# Patient Record
Sex: FEMALE | Race: WHITE | NOT HISPANIC OR LATINO | URBAN - METROPOLITAN AREA
[De-identification: names, ages, dates, MRNs, and addresses within clinical notes are randomized per-mention and may not be internally consistent; named-entity substitution may affect disease eponyms.]

---

## 2020-06-30 ENCOUNTER — INPATIENT (INPATIENT)
Facility: HOSPITAL | Age: 65
LOS: 2 days | Discharge: NURSING, REHAB OR LONG-TERM CARE | DRG: 947 | End: 2020-07-03
Payer: MEDICARE

## 2020-06-30 VITALS
DIASTOLIC BLOOD PRESSURE: 82 MMHG | RESPIRATION RATE: 20 BRPM | OXYGEN SATURATION: 88 % | SYSTOLIC BLOOD PRESSURE: 137 MMHG | TEMPERATURE: 98 F | HEART RATE: 101 BPM

## 2020-06-30 DIAGNOSIS — E11.9 TYPE 2 DIABETES MELLITUS WITHOUT COMPLICATIONS: ICD-10-CM

## 2020-06-30 DIAGNOSIS — F31.9 BIPOLAR DISORDER, UNSPECIFIED: ICD-10-CM

## 2020-06-30 DIAGNOSIS — R56.9 UNSPECIFIED CONVULSIONS: ICD-10-CM

## 2020-06-30 DIAGNOSIS — J96.91 RESPIRATORY FAILURE, UNSPECIFIED WITH HYPOXIA: ICD-10-CM

## 2020-06-30 DIAGNOSIS — E03.9 HYPOTHYROIDISM, UNSPECIFIED: ICD-10-CM

## 2020-06-30 DIAGNOSIS — R29.898 OTHER SYMPTOMS AND SIGNS INVOLVING THE MUSCULOSKELETAL SYSTEM: ICD-10-CM

## 2020-06-30 DIAGNOSIS — R41.82 ALTERED MENTAL STATUS, UNSPECIFIED: ICD-10-CM

## 2020-06-30 DIAGNOSIS — R63.8 OTHER SYMPTOMS AND SIGNS CONCERNING FOOD AND FLUID INTAKE: ICD-10-CM

## 2020-06-30 DIAGNOSIS — N32.89 OTHER SPECIFIED DISORDERS OF BLADDER: ICD-10-CM
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
## 2020-06-30 DIAGNOSIS — D69.6 THROMBOCYTOPENIA, UNSPECIFIED: ICD-10-CM

## 2020-06-30 LAB
ALBUMIN SERPL ELPH-MCNC: 3.6 G/DL — SIGNIFICANT CHANGE UP (ref 3.3–5)
ALP SERPL-CCNC: 59 U/L — SIGNIFICANT CHANGE UP (ref 40–120)
ALT FLD-CCNC: 16 U/L — SIGNIFICANT CHANGE UP (ref 10–45)
ANION GAP SERPL CALC-SCNC: 14 MMOL/L — SIGNIFICANT CHANGE UP (ref 5–17)
ANISOCYTOSIS BLD QL: SLIGHT — SIGNIFICANT CHANGE UP
APPEARANCE UR: CLEAR — SIGNIFICANT CHANGE UP
APTT BLD: 31.2 SEC — SIGNIFICANT CHANGE UP (ref 27.5–35.5)
AST SERPL-CCNC: 13 U/L — SIGNIFICANT CHANGE UP (ref 10–40)
BASE EXCESS BLDA CALC-SCNC: 2.1 MMOL/L — SIGNIFICANT CHANGE UP (ref -2–3)
BASO STIPL BLD QL SMEAR: SLIGHT — SIGNIFICANT CHANGE UP
BASOPHILS # BLD AUTO: 0.01 K/UL — SIGNIFICANT CHANGE UP (ref 0–0.2)
BASOPHILS NFR BLD AUTO: 0.2 % — SIGNIFICANT CHANGE UP (ref 0–2)
BILIRUB SERPL-MCNC: 0.3 MG/DL — SIGNIFICANT CHANGE UP (ref 0.2–1.2)
BILIRUB UR-MCNC: NEGATIVE — SIGNIFICANT CHANGE UP
BLD GP AB SCN SERPL QL: NEGATIVE — SIGNIFICANT CHANGE UP
BUN SERPL-MCNC: 22 MG/DL — SIGNIFICANT CHANGE UP (ref 7–23)
BURR CELLS BLD QL SMEAR: SIGNIFICANT CHANGE UP
CALCIUM SERPL-MCNC: 8.7 MG/DL — SIGNIFICANT CHANGE UP (ref 8.4–10.5)
CHLORIDE SERPL-SCNC: 106 MMOL/L — SIGNIFICANT CHANGE UP (ref 96–108)
CK MB CFR SERPL CALC: 2.6 NG/ML — SIGNIFICANT CHANGE UP (ref 0–6.7)
CO2 SERPL-SCNC: 24 MMOL/L — SIGNIFICANT CHANGE UP (ref 22–31)
COLOR SPEC: YELLOW — SIGNIFICANT CHANGE UP
CREAT SERPL-MCNC: 1.16 MG/DL — SIGNIFICANT CHANGE UP (ref 0.5–1.3)
D DIMER BLD IA.RAPID-MCNC: <150 NG/ML DDU — SIGNIFICANT CHANGE UP
DACRYOCYTES BLD QL SMEAR: SLIGHT — SIGNIFICANT CHANGE UP
DIFF PNL FLD: NEGATIVE — SIGNIFICANT CHANGE UP
EOSINOPHIL # BLD AUTO: 0.08 K/UL — SIGNIFICANT CHANGE UP (ref 0–0.5)
EOSINOPHIL NFR BLD AUTO: 1.6 % — SIGNIFICANT CHANGE UP (ref 0–6)
GAS PNL BLDV: SIGNIFICANT CHANGE UP
GLUCOSE BLDC GLUCOMTR-MCNC: 100 MG/DL — HIGH (ref 70–99)
GLUCOSE BLDC GLUCOMTR-MCNC: 113 MG/DL — HIGH (ref 70–99)
GLUCOSE BLDC GLUCOMTR-MCNC: 204 MG/DL — HIGH (ref 70–99)
GLUCOSE SERPL-MCNC: 183 MG/DL — HIGH (ref 70–99)
GLUCOSE UR QL: NEGATIVE — SIGNIFICANT CHANGE UP
HCO3 BLDA-SCNC: 28 MMOL/L — SIGNIFICANT CHANGE UP (ref 21–28)
HCT VFR BLD CALC: 35.1 % — SIGNIFICANT CHANGE UP (ref 34.5–45)
HGB BLD-MCNC: 10.4 G/DL — LOW (ref 11.5–15.5)
IMM GRANULOCYTES NFR BLD AUTO: 0.6 % — SIGNIFICANT CHANGE UP (ref 0–1.5)
INR BLD: 1.04 — SIGNIFICANT CHANGE UP (ref 0.88–1.16)
KETONES UR-MCNC: NEGATIVE — SIGNIFICANT CHANGE UP
LACTATE SERPL-SCNC: 1.9 MMOL/L — SIGNIFICANT CHANGE UP (ref 0.5–2)
LACTATE SERPL-SCNC: 2.1 MMOL/L — HIGH (ref 0.5–2)
LACTATE SERPL-SCNC: 3.7 MMOL/L — HIGH (ref 0.5–2)
LEUKOCYTE ESTERASE UR-ACNC: NEGATIVE — SIGNIFICANT CHANGE UP
LG PLATELETS BLD QL AUTO: PRESENT — SIGNIFICANT CHANGE UP
LIDOCAIN IGE QN: 26 U/L — SIGNIFICANT CHANGE UP (ref 7–60)
LYMPHOCYTES # BLD AUTO: 2.56 K/UL — SIGNIFICANT CHANGE UP (ref 1–3.3)
LYMPHOCYTES # BLD AUTO: 44 % — SIGNIFICANT CHANGE UP (ref 13–44)
LYMPHOCYTES # BLD AUTO: 52.6 % — HIGH (ref 13–44)
MAGNESIUM SERPL-MCNC: 1.7 MG/DL — SIGNIFICANT CHANGE UP (ref 1.6–2.6)
MANUAL DIF COMMENT BLD-IMP: SIGNIFICANT CHANGE UP
MANUAL SMEAR VERIFICATION: SIGNIFICANT CHANGE UP
MCHC RBC-ENTMCNC: 24.9 PG — LOW (ref 27–34)
MCHC RBC-ENTMCNC: 29.6 GM/DL — LOW (ref 32–36)
MCV RBC AUTO: 84 FL — SIGNIFICANT CHANGE UP (ref 80–100)
MICROCYTES BLD QL: SLIGHT — SIGNIFICANT CHANGE UP
MONOCYTES # BLD AUTO: 0.24 K/UL — SIGNIFICANT CHANGE UP (ref 0–0.9)
MONOCYTES NFR BLD AUTO: 4.9 % — SIGNIFICANT CHANGE UP (ref 2–14)
MONOCYTES NFR BLD AUTO: 9 % — SIGNIFICANT CHANGE UP (ref 2–14)
NEUTROPHILS # BLD AUTO: 1.95 K/UL — SIGNIFICANT CHANGE UP (ref 1.8–7.4)
NEUTROPHILS NFR BLD AUTO: 40.1 % — LOW (ref 43–77)
NEUTROPHILS NFR BLD AUTO: 47 % — SIGNIFICANT CHANGE UP (ref 43–77)
NITRITE UR-MCNC: NEGATIVE — SIGNIFICANT CHANGE UP
NRBC # BLD: 0 /100 WBCS — SIGNIFICANT CHANGE UP (ref 0–0)
NT-PROBNP SERPL-SCNC: 89 PG/ML — SIGNIFICANT CHANGE UP (ref 0–300)
OVALOCYTES BLD QL SMEAR: SLIGHT — SIGNIFICANT CHANGE UP
PCO2 BLDA: 47 MMHG — HIGH (ref 32–45)
PH BLDA: 7.38 — SIGNIFICANT CHANGE UP (ref 7.35–7.45)
PH UR: 6 — SIGNIFICANT CHANGE UP (ref 5–8)
PLAT MORPH BLD: ABNORMAL
PLATELET # BLD AUTO: 75 K/UL — LOW (ref 150–400)
PO2 BLDA: 96 MMHG — SIGNIFICANT CHANGE UP (ref 83–108)
POIKILOCYTOSIS BLD QL AUTO: SLIGHT — SIGNIFICANT CHANGE UP
POLYCHROMASIA BLD QL SMEAR: SLIGHT — SIGNIFICANT CHANGE UP
POTASSIUM SERPL-MCNC: 4.2 MMOL/L — SIGNIFICANT CHANGE UP (ref 3.5–5.3)
POTASSIUM SERPL-SCNC: 4.2 MMOL/L — SIGNIFICANT CHANGE UP (ref 3.5–5.3)
PROT SERPL-MCNC: 6.5 G/DL — SIGNIFICANT CHANGE UP (ref 6–8.3)
PROT UR-MCNC: NEGATIVE MG/DL — SIGNIFICANT CHANGE UP
PROTHROM AB SERPL-ACNC: 12.4 SEC — SIGNIFICANT CHANGE UP (ref 10.6–13.6)
RBC # BLD: 4.18 M/UL — SIGNIFICANT CHANGE UP (ref 3.8–5.2)
RBC # FLD: 16.3 % — HIGH (ref 10.3–14.5)
RBC BLD AUTO: ABNORMAL
RH IG SCN BLD-IMP: POSITIVE — SIGNIFICANT CHANGE UP
SAO2 % BLDA: 97 % — SIGNIFICANT CHANGE UP (ref 95–100)
SARS-COV-2 RNA SPEC QL NAA+PROBE: SIGNIFICANT CHANGE UP
SMUDGE CELLS # BLD: SIGNIFICANT CHANGE UP
SODIUM SERPL-SCNC: 144 MMOL/L — SIGNIFICANT CHANGE UP (ref 135–145)
SP GR SPEC: 1.02 — SIGNIFICANT CHANGE UP (ref 1–1.03)
SPHEROCYTES BLD QL SMEAR: SLIGHT — SIGNIFICANT CHANGE UP
TROPONIN T SERPL-MCNC: <0.01 NG/ML — SIGNIFICANT CHANGE UP (ref 0–0.01)
TSH SERPL-MCNC: 0.01 UIU/ML — LOW (ref 0.35–4.94)
UROBILINOGEN FLD QL: 0.2 E.U./DL — SIGNIFICANT CHANGE UP
WBC # BLD: 4.87 K/UL — SIGNIFICANT CHANGE UP (ref 3.8–10.5)
WBC # FLD AUTO: 4.87 K/UL — SIGNIFICANT CHANGE UP (ref 3.8–10.5)

## 2020-06-30 PROCEDURE — 72125 CT NECK SPINE W/O DYE: CPT | Mod: 26

## 2020-06-30 PROCEDURE — 93308 TTE F-UP OR LMTD: CPT | Mod: 26

## 2020-06-30 PROCEDURE — 72131 CT LUMBAR SPINE W/O DYE: CPT | Mod: 26

## 2020-06-30 PROCEDURE — 71045 X-RAY EXAM CHEST 1 VIEW: CPT | Mod: 26

## 2020-06-30 PROCEDURE — 99223 1ST HOSP IP/OBS HIGH 75: CPT | Mod: GC

## 2020-06-30 PROCEDURE — 93010 ELECTROCARDIOGRAM REPORT: CPT

## 2020-06-30 PROCEDURE — 72170 X-RAY EXAM OF PELVIS: CPT | Mod: 26

## 2020-06-30 PROCEDURE — 70450 CT HEAD/BRAIN W/O DYE: CPT | Mod: 26

## 2020-06-30 PROCEDURE — 99285 EMERGENCY DEPT VISIT HI MDM: CPT | Mod: CS

## 2020-06-30 PROCEDURE — 99222 1ST HOSP IP/OBS MODERATE 55: CPT

## 2020-06-30 RX ORDER — QUETIAPINE FUMARATE 200 MG/1
400 TABLET, FILM COATED ORAL AT BEDTIME
Refills: 0 | Status: DISCONTINUED | OUTPATIENT
Start: 2020-06-30 | End: 2020-07-03

## 2020-06-30 RX ORDER — NITROFURANTOIN MACROCRYSTAL 50 MG
50 CAPSULE ORAL
Refills: 0 | Status: DISCONTINUED | OUTPATIENT
Start: 2020-06-30 | End: 2020-06-30

## 2020-06-30 RX ORDER — GLUCAGON INJECTION, SOLUTION 0.5 MG/.1ML
1 INJECTION, SOLUTION SUBCUTANEOUS ONCE
Refills: 0 | Status: DISCONTINUED | OUTPATIENT
Start: 2020-06-30 | End: 2020-07-03

## 2020-06-30 RX ORDER — SODIUM CHLORIDE 9 MG/ML
2000 INJECTION INTRAMUSCULAR; INTRAVENOUS; SUBCUTANEOUS ONCE
Refills: 0 | Status: COMPLETED | OUTPATIENT
Start: 2020-06-30 | End: 2020-06-30

## 2020-06-30 RX ORDER — ATORVASTATIN CALCIUM 80 MG/1
20 TABLET, FILM COATED ORAL AT BEDTIME
Refills: 0 | Status: DISCONTINUED | OUTPATIENT
Start: 2020-06-30 | End: 2020-07-03

## 2020-06-30 RX ORDER — NITROFURANTOIN MACROCRYSTAL 50 MG
50 CAPSULE ORAL EVERY 24 HOURS
Refills: 0 | Status: DISCONTINUED | OUTPATIENT
Start: 2020-06-30 | End: 2020-07-03

## 2020-06-30 RX ORDER — DEXTROSE 50 % IN WATER 50 %
15 SYRINGE (ML) INTRAVENOUS ONCE
Refills: 0 | Status: DISCONTINUED | OUTPATIENT
Start: 2020-06-30 | End: 2020-07-03

## 2020-06-30 RX ORDER — DEXTROSE 50 % IN WATER 50 %
25 SYRINGE (ML) INTRAVENOUS ONCE
Refills: 0 | Status: DISCONTINUED | OUTPATIENT
Start: 2020-06-30 | End: 2020-07-03

## 2020-06-30 RX ORDER — INSULIN LISPRO 100/ML
VIAL (ML) SUBCUTANEOUS
Refills: 0 | Status: DISCONTINUED | OUTPATIENT
Start: 2020-06-30 | End: 2020-07-03

## 2020-06-30 RX ORDER — CHOLECALCIFEROL (VITAMIN D3) 125 MCG
1000 CAPSULE ORAL DAILY
Refills: 0 | Status: DISCONTINUED | OUTPATIENT
Start: 2020-06-30 | End: 2020-07-03

## 2020-06-30 RX ORDER — FERROUS SULFATE 325(65) MG
325 TABLET ORAL DAILY
Refills: 0 | Status: DISCONTINUED | OUTPATIENT
Start: 2020-06-30 | End: 2020-07-03

## 2020-06-30 RX ORDER — ARIPIPRAZOLE 15 MG/1
10 TABLET ORAL DAILY
Refills: 0 | Status: DISCONTINUED | OUTPATIENT
Start: 2020-06-30 | End: 2020-07-03

## 2020-06-30 RX ORDER — DIVALPROEX SODIUM 500 MG/1
1000 TABLET, DELAYED RELEASE ORAL AT BEDTIME
Refills: 0 | Status: DISCONTINUED | OUTPATIENT
Start: 2020-06-30 | End: 2020-07-03

## 2020-06-30 RX ORDER — OXYBUTYNIN CHLORIDE 5 MG
10 TABLET ORAL DAILY
Refills: 0 | Status: DISCONTINUED | OUTPATIENT
Start: 2020-06-30 | End: 2020-06-30

## 2020-06-30 RX ORDER — SODIUM CHLORIDE 9 MG/ML
1000 INJECTION, SOLUTION INTRAVENOUS
Refills: 0 | Status: DISCONTINUED | OUTPATIENT
Start: 2020-06-30 | End: 2020-07-03

## 2020-06-30 RX ORDER — OXYBUTYNIN CHLORIDE 5 MG
5 TABLET ORAL EVERY 12 HOURS
Refills: 0 | Status: DISCONTINUED | OUTPATIENT
Start: 2020-06-30 | End: 2020-07-03

## 2020-06-30 RX ORDER — LIOTHYRONINE SODIUM 25 UG/1
25 TABLET ORAL DAILY
Refills: 0 | Status: DISCONTINUED | OUTPATIENT
Start: 2020-06-30 | End: 2020-07-03

## 2020-06-30 RX ORDER — ENOXAPARIN SODIUM 100 MG/ML
40 INJECTION SUBCUTANEOUS EVERY 24 HOURS
Refills: 0 | Status: DISCONTINUED | OUTPATIENT
Start: 2020-06-30 | End: 2020-07-03

## 2020-06-30 RX ORDER — DEXTROSE 50 % IN WATER 50 %
12.5 SYRINGE (ML) INTRAVENOUS ONCE
Refills: 0 | Status: DISCONTINUED | OUTPATIENT
Start: 2020-06-30 | End: 2020-07-03

## 2020-06-30 RX ADMIN — DIVALPROEX SODIUM 1000 MILLIGRAM(S): 500 TABLET, DELAYED RELEASE ORAL at 23:47

## 2020-06-30 RX ADMIN — QUETIAPINE FUMARATE 400 MILLIGRAM(S): 200 TABLET, FILM COATED ORAL at 23:47

## 2020-06-30 RX ADMIN — Medication 50 MILLIGRAM(S): at 18:18

## 2020-06-30 RX ADMIN — ATORVASTATIN CALCIUM 20 MILLIGRAM(S): 80 TABLET, FILM COATED ORAL at 22:56

## 2020-06-30 RX ADMIN — Medication 30 MILLIGRAM(S): at 11:05

## 2020-06-30 RX ADMIN — ARIPIPRAZOLE 10 MILLIGRAM(S): 15 TABLET ORAL at 11:04

## 2020-06-30 RX ADMIN — ENOXAPARIN SODIUM 40 MILLIGRAM(S): 100 INJECTION SUBCUTANEOUS at 22:56

## 2020-06-30 RX ADMIN — Medication 325 MILLIGRAM(S): at 11:04

## 2020-06-30 RX ADMIN — LIOTHYRONINE SODIUM 25 MICROGRAM(S): 25 TABLET ORAL at 18:19

## 2020-06-30 RX ADMIN — SODIUM CHLORIDE 2000 MILLILITER(S): 9 INJECTION INTRAMUSCULAR; INTRAVENOUS; SUBCUTANEOUS at 05:15

## 2020-06-30 RX ADMIN — Medication 4: at 22:56

## 2020-06-30 RX ADMIN — Medication 1000 UNIT(S): at 11:04

## 2020-06-30 RX ADMIN — Medication 5 MILLIGRAM(S): at 22:56

## 2020-06-30 NOTE — H&P ADULT - PROBLEM SELECTOR PLAN 1
- Patient presents for 5 falls in the last 3 days associated with lightheadedness and LE weakness. There are no signs of cord compression on exam. There is LE weakness and it is unclear how long that has been present  - Differential is wide. Most likely causes are polypharmacy in this patient with multiple medications that can cause somnolence vs orthostatic hypotension. Cardiogenic cause is less likely given patient's EKG without significant abnormalities and Trop negative x1 and has no anginal symptoms. Syncope is not described. Neurogenic causes possible, CT head without stroke and history is not consistent with seizure activity. May be in setting of hypoglycemia as patient on Januvia outpatient. Less likely infectious as patient without s/s infection  - Check orthostatics  - Obtain echocardiogram (also in setting of SOB)  - CT lumbar and cervical spine without acute compression fractures  - CT head negative for any findings  - PT recs for disposition, anticipate [facility]AI

## 2020-06-30 NOTE — H&P ADULT - PROBLEM SELECTOR PLAN 9
F; s/p 2L NS in ED  E: Replete PRN  N: DASH CC  FULL CODE  DVT prophy lovenox  D RMF - Continue home oxybutynin 10mg ER therapeutic interchange 5mg daily  - on nitrofurantoin 50mg daily prophy for hx recurrent UTIs, will continue

## 2020-06-30 NOTE — ED ADULT NURSE NOTE - OBJECTIVE STATEMENT
Pt arrives c/o of AMS. Pt lethargic and responsive to painful stimuli initially. Pt awake during assessment/IV start. Pt Aox3. Pt reports multiple falls today and states she hasn't been using her walker, and reports she was unbable to walk earlier today. Pt complains of R sided back pain, worse with palpation. Pt reports to ETOH tonight. Pt has slurred speech, AOx3.

## 2020-06-30 NOTE — ED PROVIDER NOTE - ATTENDING CONTRIBUTION TO CARE
66 yo f w hx of DM, anxiety, depression presents to ED with concern for fall - noted to have multiple falls over the past few days.  Son at bedside notes she has fallen several times, hitting her life alert.  Patient awake and alert on arrival to ED and notes mild pain to right lower  back.  On my face to face ED eval, patient is non toxic in appearance.  Awake, alert and in NAD.  NC/AT.  no midline cervical spine pain/tenderness/stepoff/deformity. no midline thoracic or lumbar spine pain/tenderness/stepoff/deformity.  Pelvis stable to compression.  + Mild tenderness on palpation to right lumbar paraspinal musculature.  Will check labs, imaging of head, neck chest, pelvis, back and dispo accordingly.  Anticipate admission.

## 2020-06-30 NOTE — ED ADULT NURSE NOTE - NSIMPLEMENTINTERV_GEN_ALL_ED
Implemented All Fall Risk Interventions:  [place] to call system. Call bell, personal items and telephone within reach. Instruct patient to call for assistance. Room bathroom lighting operational. Non-slip footwear when patient is off stretcher. Physically safe environment: no spills, clutter or unnecessary equipment. Stretcher in lowest position, wheels locked, appropriate side rails in place. Provide visual cue, wrist band, yellow gown, etc. Monitor gait and stability. Monitor for mental status changes and reorient to person, place, and time. Review medications for side effects contributing to fall risk. Reinforce activity limits and safety measures with patient and family.

## 2020-06-30 NOTE — ED PROVIDER NOTE - PROGRESS NOTE DETAILS
neeru: pt seen upon arrival for dr franco w/ miguel granados.    per ems, pt s/p fall x5 found alert but now somnolent upon arrival to ED. in triage, afebrile, hds, spo2 87% on RA. somnolent w/ poor response to sternal rub.    upon arrival to resus room, pt initially somnolent but stirred awake and remained alert s/p sternal rub/PIV placement. afebrile. hds. initially sinus tachy. no hypoxia. abc intact. gcs 15. a+ox3. no acute focal neuro deficits, unable to assess gait. +L lateral lower back pain. otherwise asymptomatic and atraumatic. fs wnl. ekg w/o acute abnl. no indication for emergent RSI. labs/cxr/pelvis xray/ct head/ct c spine/ct l spine. care taken over by dr franco. neeru: pt seen upon arrival for dr franco w/ pa roberta. per ems, pt s/p fall x5 found alert but now somnolent upon arrival to ED. pt on klonopin/seroquel. no antiplatelet/AC. in triage, afebrile, hds, spo2 87% on RA. somnolent w/ poor response to sternal rub.    upon arrival to resus room, pt initially somnolent but stirred awake and remained alert s/p sternal rub/PIV placement. afebrile. hds. initially sinus tachy. no hypoxia. abc intact. gcs 15. a+ox3. no acute focal neuro deficits, unable to assess gait. +L lateral lower back pain. otherwise asymptomatic and atraumatic. fs wnl. ekg w/o acute abnl. no indication for emergent RSI. labs/cxr/pelvis xray/ct head/ct c spine/ct l spine. care taken over by dr franco.

## 2020-06-30 NOTE — ED ADULT NURSE REASSESSMENT NOTE - NS ED NURSE REASSESS COMMENT FT1
Patient awaiting transport to bed assignment on [address] [place]. Bed sheets changed and patient repositioned. Patient and family updated about plan of care. Will continue to monitor.

## 2020-06-30 NOTE — CONSULT NOTE ADULT - PROBLEM SELECTOR RECOMMENDATION 2
Lethargic on exam and interview. Responds to sternal rub and painful stimuli.  - Potential cause Polypharmacy and/or drug interactions (on high-dose seroquel, depakote) and anticholinergic agents  - Consider psych consult regarding home regimen for bipolar depression  - EEG to assess post-ictal state as cause of altered mental status

## 2020-06-30 NOTE — CONSULT NOTE ADULT - SUBJECTIVE AND OBJECTIVE BOX
HPI: Ms. Gruber is a 65 year old female with PMHx of bipolar disorder, HLD, T2DM, anemia, prior UTIs on chronic nitrofurantoin, and hypothyroidism who presents for acute falls. She was accompanied by her son and she states she feels weak. She has suffered multiple falls within the last 3 days. She has been evaluated in the past for falls and it was attributed to weakness. She was seen for a similar problem at an ED in the [place] and was told she needs physical therapy.     T(C): 36.4 (20 @ 09:53), Max: 36.7 (20 @ 04:01)  HR: 59 (20 @ 13:40) (56 - 101)  BP: 157/90 (20 @ 13:40) (119/68 - 178/75)  RR: 18 (20 @ 13:40) (16 - 20)  SpO2: 95% (20 @ 13:40) (88% - 97%)    PAST MEDICAL & SURGICAL HISTORY:  Bipolar disorder  Hypothyroid  T2DM (type 2 diabetes mellitus)    FAMILY HISTORY:    SOCIAL HISTORY:  Denies smoking, drinking, or drug use    ROS: Negative unless otherwise noted    MEDICATIONS  (STANDING):  ARIPiprazole 10 milliGRAM(s) Oral daily  atorvastatin 20 milliGRAM(s) Oral at bedtime  cholecalciferol 1000 Unit(s) Oral daily  dextrose 5%. 1000 milliLiter(s) (50 mL/Hr) IV Continuous <Continuous>  dextrose 50% Injectable 12.5 Gram(s) IV Push once  dextrose 50% Injectable 25 Gram(s) IV Push once  dextrose 50% Injectable 25 Gram(s) IV Push once  diVALproex DR 1000 milliGRAM(s) Oral at bedtime  enoxaparin Injectable 40 milliGRAM(s) SubCutaneous every 24 hours  ferrous    sulfate 325 milliGRAM(s) Oral daily  insulin lispro (HumaLOG) corrective regimen sliding scale   SubCutaneous Before meals and at bedtime  liothyronine 25 MICROGram(s) Oral daily  nitrofurantoin macrocrystals (MACRODANTIN) 50 milliGRAM(s) Oral every 24 hours  oxybutynin 5 milliGRAM(s) Oral every 12 hours  PARoxetine 30 milliGRAM(s) Oral daily  QUEtiapine 400 milliGRAM(s) Oral at bedtime    MEDICATIONS  (PRN):  dextrose 40% Gel 15 Gram(s) Oral once PRN Blood Glucose LESS THAN 70 milliGRAM(s)/deciliter  glucagon  Injectable 1 milliGRAM(s) IntraMuscular once PRN Glucose LESS THAN 70 milligrams/deciliter    Allergies    No Known Allergies    Intolerances      Vital Signs Last 24 Hrs  T(C): 36.4 (2020 09:53), Max: 36.7 (2020 04:01)  T(F): 97.5 (2020 09:53), Max: 98 (2020 04:01)  HR: 59 (2020 13:40) (56 - 101)  BP: 157/90 (2020 13:40) (119/68 - 178/75)  RR: 18 (2020 13:40) (16 - 20)  SpO2: 95% (2020 13:40) (88% - 97%)    Physical exam:  General: Somnolent, difficult to arouse but responds to sternal rub  Cardiovascular: Regular rate and rhythm, no murmurs  Pulmonary: Anterior breath sounds clear bilaterally  Extremities: Radial and DP pulses +2, no edema    Neurologic:  -Mental status: AAO x 1 (responds to name at time of exam)  -Cranial nerves:   II: Visual fields are full to confrontation.  III, IV, VI: Extraocular movements are intact without nystagmus. Pupils equally round and reactive to light  V:  Facial sensation V1-V3 equal and intact   VII: Face is symmetric with normal eye closure and smile  VIII: Hearing is bilaterally intact to finger rub  IX, X: Uvula is midline and soft palate rises symmetrically  XI: Head turning and shoulder shrug are intact.  XII: Tongue protrudes midline  Motor: Normal bulk and tone. No pronator drift. Strength bilateral upper extremity 5/5, bilateral lower extremities 5/5.  Rapid alternating movements intact and symmetric  Sensation: unable to assess, although feet are tender to palpation (withdraws from pain)  Coordination: Not able to participate  Reflexes: Downgoing toes bilaterally   Gait: Unable to assess      POCT Blood Glucose.: 113 mg/dL (2020 11:14)    LABS:                        10.4   4.87  )-----------( 75       ( 2020 04:12 )             35.1     06-30    144  |  106  |  22  ----------------------------<  183<H>  4.2   |  24  |  1.16    Ca    8.7      2020 04:12  Mg     1.7     06-30    TPro  6.5  /  Alb  3.6  /  TBili  0.3  /  DBili  x   /  AST  13  /  ALT  16  /  AlkPhos  59  06-30    PT/INR - ( 2020 04:12 )   PT: 12.4 sec;   INR: 1.04          PTT - ( 2020 04:12 )  PTT:31.2 sec  CARDIAC MARKERS ( 2020 04:12 )  x     / <0.01 ng/mL / 72 U/L / x     / 2.6 ng/mL      Urinalysis Basic - ( 2020 06:50 )    Color: Yellow / Appearance: Clear / S.025 / pH: x  Gluc: x / Ketone: NEGATIVE  / Bili: Negative / Urobili: 0.2 E.U./dL   Blood: x / Protein: NEGATIVE mg/dL / Nitrite: NEGATIVE   Leuk Esterase: NEGATIVE / RBC: x / WBC x   Sq Epi: x / Non Sq Epi: x / Bacteria: x HPI: Ms. Gruber is a 65 year old female with PMHx of bipolar disorder, HLD, T2DM, anemia, prior UTIs on chronic nitrofurantoin, and hypothyroidism who presents for acute falls. She was accompanied by her son and she states she feels weak. She has suffered multiple falls within the last 3 days. She has been evaluated in the past for falls and it was attributed to weakness. She was seen for a similar problem at an ED in the [place] and was told she needs physical therapy.     T(C): 36.4 (20 @ 09:53), Max: 36.7 (20 @ 04:01)  HR: 59 (20 @ 13:40) (56 - 101)  BP: 157/90 (20 @ 13:40) (119/68 - 178/75)  RR: 18 (20 @ 13:40) (16 - 20)  SpO2: 95% (20 @ 13:40) (88% - 97%)    PAST MEDICAL & SURGICAL HISTORY:  Bipolar disorder  Hypothyroid  T2DM (type 2 diabetes mellitus)    FAMILY HISTORY:    SOCIAL HISTORY:  Denies smoking, drinking, or drug use    ROS: Negative unless otherwise noted    MEDICATIONS  (STANDING):  ARIPiprazole 10 milliGRAM(s) Oral daily  atorvastatin 20 milliGRAM(s) Oral at bedtime  cholecalciferol 1000 Unit(s) Oral daily  dextrose 5%. 1000 milliLiter(s) (50 mL/Hr) IV Continuous <Continuous>  dextrose 50% Injectable 12.5 Gram(s) IV Push once  dextrose 50% Injectable 25 Gram(s) IV Push once  dextrose 50% Injectable 25 Gram(s) IV Push once  diVALproex DR 1000 milliGRAM(s) Oral at bedtime  enoxaparin Injectable 40 milliGRAM(s) SubCutaneous every 24 hours  ferrous    sulfate 325 milliGRAM(s) Oral daily  insulin lispro (HumaLOG) corrective regimen sliding scale   SubCutaneous Before meals and at bedtime  liothyronine 25 MICROGram(s) Oral daily  nitrofurantoin macrocrystals (MACRODANTIN) 50 milliGRAM(s) Oral every 24 hours  oxybutynin 5 milliGRAM(s) Oral every 12 hours  PARoxetine 30 milliGRAM(s) Oral daily  QUEtiapine 400 milliGRAM(s) Oral at bedtime    MEDICATIONS  (PRN):  dextrose 40% Gel 15 Gram(s) Oral once PRN Blood Glucose LESS THAN 70 milliGRAM(s)/deciliter  glucagon  Injectable 1 milliGRAM(s) IntraMuscular once PRN Glucose LESS THAN 70 milligrams/deciliter    Allergies    No Known Allergies    Intolerances      Vital Signs Last 24 Hrs  T(C): 36.4 (2020 09:53), Max: 36.7 (2020 04:01)  T(F): 97.5 (2020 09:53), Max: 98 (2020 04:01)  HR: 59 (2020 13:40) (56 - 101)  BP: 157/90 (2020 13:40) (119/68 - 178/75)  RR: 18 (2020 13:40) (16 - 20)  SpO2: 95% (2020 13:40) (88% - 97%)    Physical exam:  General: Somnolent, difficult to arouse but responds to sternal rub  Cardiovascular: Regular rate and rhythm, no murmurs  Pulmonary: Anterior breath sounds clear bilaterally  Extremities: Radial and DP pulses +2, no edema    very lethargic, awakening briefly upon sternal rub, before drifting back to sleep.  perrl, +oculocephalics.  withdraws all 4 briskly to noxious.    POCT Blood Glucose.: 113 mg/dL (2020 11:14)    LABS:                        10.4   4.87  )-----------( 75       ( 2020 04:12 )             35.1     06-30    144  |  106  |  22  ----------------------------<  183<H>  4.2   |  24  |  1.16    Ca    8.7      2020 04:12  Mg     1.7     06-30    TPro  6.5  /  Alb  3.6  /  TBili  0.3  /  DBili  x   /  AST  13  /  ALT  16  /  AlkPhos  59  06-30    PT/INR - ( 2020 04:12 )   PT: 12.4 sec;   INR: 1.04          PTT - ( 2020 04:12 )  PTT:31.2 sec  CARDIAC MARKERS ( 2020 04:12 )  x     / <0.01 ng/mL / 72 U/L / x     / 2.6 ng/mL      Urinalysis Basic - ( 2020 06:50 )    Color: Yellow / Appearance: Clear / S.025 / pH: x  Gluc: x / Ketone: NEGATIVE  / Bili: Negative / Urobili: 0.2 E.U./dL   Blood: x / Protein: NEGATIVE mg/dL / Nitrite: NEGATIVE   Leuk Esterase: NEGATIVE / RBC: x / WBC x   Sq Epi: x / Non Sq Epi: x / Bacteria: x

## 2020-06-30 NOTE — H&P ADULT - NSHPPHYSICALEXAM_GEN_ALL_CORE
.  VITAL SIGNS:  T(C): 36.4 (06-30-20 @ 09:53), Max: 36.7 (06-30-20 @ 04:01)  T(F): 97.5 (06-30-20 @ 09:53), Max: 98 (06-30-20 @ 04:01)  HR: 56 (06-30-20 @ 09:53) (56 - 101)  BP: 161/84 (06-30-20 @ 09:53) (119/68 - 178/75)  BP(mean): --  RR: 18 (06-30-20 @ 09:53) (16 - 20)  SpO2: 96% (06-30-20 @ 09:53) (88% - 97%)  Wt(kg): --    PHYSICAL EXAM:    Constitutional: WDWN resting comfortably in bed; NAD on 2L NC  Head: NC/AT  Eyes: PERRL, EOMI, clear conjunctiva  ENT: no nasal discharge; uvula midline, no oropharyngeal erythema or exudates; MMM  Neck: supple; no JVD  Respiratory: CTA B/L; no W/R/R  Cardiac: +S1/S2; RRR; no M/R/G  Gastrointestinal: soft, NT/ND; no rebound or guarding; +BSx4  Back: spine midline, no midline tenderness; no CVAT B/L  Extremities: WWP, no clubbing or cyanosis; no peripheral edema  Musculoskeletal: Limited ROM LE at hip, see neuro exam; no joint swelling, tenderness or erythema  Vascular: 2+ radial and pedal pulses  Dermatologic: skin warm, dry and intact; no rashes, wounds, or scars  Neurologic: AAOx1-2 (self, location); CNII-XII grossly intact; moves all extremities. LE with 3/5 strength at the hip. No saddle anesthesia. Rectal tone intact. Sensation intact bilaterally in UE and LE; notable RUE shaking/trembling throughout exam  Psychiatric: affect and characteristics of appearance, verbalizations, behaviors are appropriate

## 2020-06-30 NOTE — H&P ADULT - NSHPREVIEWOFSYSTEMS_GEN_ALL_CORE
Gen: +falls; no weight loss, no fevers/chills  HEENT: +lightheadedness, no headaches, syncope, no changes in hearing, no dysphagia  CV: no chest pain, no palpitations  Pulm: no shortness of breath, no cough  GI: No abdominal pain, no diarrhea, no blood in stool  : No dysuria, no frequency  Neuro: No numbness/tingling, no headaches  MSK: +LE weakness  Heme: no easy bruising  Psych: no history of psych disorders, no depression

## 2020-06-30 NOTE — H&P ADULT - HISTORY OF PRESENT ILLNESS
INCOMPLETE NOTE This is a 65F with PMHx bipolar disorder, ?Parkinson's, HLD, T2DM, anemia, prior UTIs on chronic nitrofurantoin, and hypothyroidism who presents for acute falls. Patient was in usual state of health until 3 days ago when she began falling. Falls are associated with lightheadedness and LE weakness but no dizziness or vertigo. Patient is accompanied by son. Reporting that in the past this has occurred several years ago at which time patient was evaluated and was told the falls were due to weakness. Patient had not lost consciousness or had any head trauma. She did go to another ED in the [place] 3 days ago when this first occurred, but she was discharged from the ED and told she needed physical therapy. EMS was called today because patient fell 3 times more and son was concerned. Patient lives alone but has home health aids and nursing that assist her. She reports she has felt LE weakness but denies chest pain, shortness of breath, headaches, nausea, vomiting. She had an episode of bladder incontinence while being examined but has not had frequency, urgency, dysuria, or incontinence prior to this episode. She denies any bowel incontinence. At home and prior to this weakness, patient was able to reportedly ambulate without assistive devices.     In ED: T 98F,  --> 62, /82, RR 20 sating 88% on RA --> 97% on 2L NC. Labs notable for lactate 3.7. CT imaging of head and neck negative. Patient received 2L NS in ED.

## 2020-06-30 NOTE — ED PROVIDER NOTE - CLINICAL SUMMARY MEDICAL DECISION MAKING FREE TEXT BOX
64 y/o f poor historian hx HTN, DM, HLD, anxiety/depression presents s/p several falls in the past day; O2 sat on arrival was 88% on RA although pt slumped in EMS stretcher, when sat upright O2 sat is 95% on RA, pt with no resp complaints, only having right low back pain.  Neuro exam intact, labs significant for lactate 3.7, pt on metformin which is possible etiology, no anion gap, will hydrate and repeat.  CT head, cspine, lumbar spine pending, normal cxr and pelvis xr.  Per son who is in ED, he thinks pt fell three times at  home yesterday, she lives alone, has had several falls in the past month.  Will plan for admission for possible placement and PT eval after w/u

## 2020-06-30 NOTE — ED PROVIDER NOTE - OBJECTIVE STATEMENT
66 y/o f poor historian hx HTN, DM, HLD, anxiety/depression presents c/o several falls in the past day.  Pt stating she thinks she fell 5 times in the past day.  Pt can't recall why she fell but states "sometimes I feel wobbly when I walk."  Pt has a walker at home but seldom uses it, hadn't been using it during any of her falls.  Pt doesn't think she hit her head, no LOC for any falls, only complaint is pain to right low back.  Pt denies dizziness, CP, SOB, fever, chills, bowel/bladder incontinence, saddle anesthesia, numbness/tingling to ext, weakness, all other ROS negative.

## 2020-06-30 NOTE — H&P ADULT - PROBLEM SELECTOR PLAN 3
- 3/5 strength in LE, unclear if acute or chronic as poor historian  - As above without concern for acute process  - Neuro consulted for assistance in managing, appreciate recs  - PT

## 2020-06-30 NOTE — H&P ADULT - PROBLEM SELECTOR PLAN 2
- As above  - Patient mental status improved compared to last night per son, however not back to baseline  - Currently aaox1-2, on presentation was somnolent, reportedly at baseline aaox2-3  - Plan as above workup for fall. Also may have some symptoms in setting of hypoxia, see below

## 2020-06-30 NOTE — H&P ADULT - PROBLEM SELECTOR PLAN 4
- Patient presenting with possible hypoxia, in ED noted to have spo2 88% on RA, however then noted to have positional improvement to 95%. Placed on 2L NC  - CXR with decreased lung volumes, possible fluid overload  - Will obtain echo to assess for reduced EF  - Consider lasix if SOB worsens as patient received 2L fluid in ED  - Some atelectasis as well, give incentive spirometer  - Wean o2 as tolerated

## 2020-06-30 NOTE — CONSULT NOTE ADULT - PROBLEM SELECTOR RECOMMENDATION 9
On exam, patient lethargic and difficult to arouse; however, she does withdraw both legs to pain with proper strength. Rectal tone intact per primary team at initial assessment  - CT head, lumbar and cervical spine negative for acute findings or compression fractures, respectively  - Add on Hemoglobin A1c, DM2-related neuropathy could be relating to falls On exam, patient lethargic and difficult to arouse; however, she does withdraw both legs to pain, briskly, appearing to have proper strength. Rectal tone intact per primary team at initial assessment  - CT head, lumbar and cervical spine negative for acute findings or compression fractures, respectively  - Add on Hemoglobin A1c, DM2-related neuropathy could be relating to falls

## 2020-06-30 NOTE — H&P ADULT - ASSESSMENT
This is a 65F with PMHx bipolar disorder, ?Parkinson's, HLD, T2DM, anemia, prior UTIs on chronic nitrofurantoin, and hypothyroidism who presents for acute falls.

## 2020-06-30 NOTE — H&P ADULT - PROBLEM SELECTOR PLAN 5
- Hx bipolar d/o  - Continue home medications aripiprazole 10mg daily, valproic acid 1g nightly, paroxetine 30mg daily, and seroquel 400mg daily  - Patient is on klonopin 0.5 mg TID PRN, however will hold this medication in setting of AMS as above  - monitor for s/s benzo withdrawal - plt 75 on admission, unknown baseline, obtain collateral from pcp  - trend  - no s/s bleeding

## 2020-06-30 NOTE — H&P ADULT - NSHPLABSRESULTS_GEN_ALL_CORE
.  LABS:                         10.4   4.87  )-----------( 75       ( 2020 04:12 )             35.1         144  |  106  |  22  ----------------------------<  183<H>  4.2   |  24  |  1.16    Ca    8.7      2020 04:12  Mg     1.7         TPro  6.5  /  Alb  3.6  /  TBili  0.3  /  DBili  x   /  AST  13  /  ALT  16  /  AlkPhos  59  30    PT/INR - ( 2020 04:12 )   PT: 12.4 sec;   INR: 1.04          PTT - ( 2020 04:12 )  PTT:31.2 sec  Urinalysis Basic - ( 2020 06:50 )    Color: Yellow / Appearance: Clear / S.025 / pH: x  Gluc: x / Ketone: NEGATIVE  / Bili: Negative / Urobili: 0.2 E.U./dL   Blood: x / Protein: NEGATIVE mg/dL / Nitrite: NEGATIVE   Leuk Esterase: NEGATIVE / RBC: x / WBC x   Sq Epi: x / Non Sq Epi: x / Bacteria: x      CARDIAC MARKERS ( 2020 04:12 )  x     / <0.01 ng/mL / 72 U/L / x     / 2.6 ng/mL      Serum Pro-Brain Natriuretic Peptide: 89 pg/mL ( @ 04:12)    Lactate, Blood: 2.1 mmol/L ( @ 06:50)  Lactate, Blood: 3.7 mmol/L ( @ 04:11)      RADIOLOGY, EKG & ADDITIONAL TESTS: Reviewed.     < from: CT Head No Cont (20 @ 05:33) >    1. Nodular, polypoidmucosal thickening within the sphenoid sinus containing intrinsic calcific density. This may represent fungal infection or chronic sinus disease with inspissation.  2. Diminished attenuation within periventricular white matter consistent with chronicmicroangiopathic disease.  3. Hyperostosis frontalis interna.    < end of copied text >    < from: CT Lumbar Spine No Cont (20 @ 05:33) >    IMPRESSION:  1. No acute fractures.    2. Chronic compression deformity at T11.     < end of copied text >    < from: CT Cervical Spine No Cont (20 @ 06:39) >    IMPRESSION:    1. No acute fractures.   2. Mild nonspecific prevertebral soft tissue thickening. No obvious signs of infection or injury.     < end of copied text >    CXR- decreased lung volumes bilaterally, mild pulmonary congestion  EKG- NSR, T wave flattening in precordial and lateral leads

## 2020-06-30 NOTE — H&P ADULT - PROBLEM SELECTOR PLAN 8
- Continue home oxybutynin 10mg ER therapeutic interchange 5mg daily  - on nitrofurantoin 50mg daily prophy for hx recurrent UTIs, will continue - Continue home liothyronine 25mcg daily

## 2020-06-30 NOTE — H&P ADULT - PROBLEM SELECTOR PLAN 6
- Hx T2DM with reported hypoglycemic episodes including on prior hospital presentations for falls  - SSI  - Hold home metformin and januvia, f/u A1c in AM    #Lactic acidosis  - Patient is perfusing, appears to be mentating improved compared to prior, trend to clear, no concern for acute infection at this time unless hemodynamics change or a new source of infection is identified - Hx bipolar d/o  - Continue home medications aripiprazole 10mg daily, valproic acid 1g nightly, paroxetine 30mg daily, and seroquel 400mg daily  - Patient is on klonopin 0.5 mg TID PRN, however will hold this medication in setting of AMS as above  - monitor for s/s benzo withdrawal

## 2020-06-30 NOTE — H&P ADULT - PROBLEM SELECTOR PLAN 7
- Continue home liothyronine 25mcg daily - Hx T2DM with reported hypoglycemic episodes including on prior hospital presentations for falls  - SSI  - Hold home metformin and januvia, f/u A1c in AM    #Lactic acidosis  - Patient is perfusing, appears to be mentating improved compared to prior, trend to clear, no concern for acute infection at this time unless hemodynamics change or a new source of infection is identified

## 2020-06-30 NOTE — H&P ADULT - ATTENDING COMMENTS
Patient seen and examined with house-staff during bedside rounds.  Resident note read, including vitals, physical findings, laboratory data, and radiological reports.   Revisions included below.  Direct personal management at bed side and extensive interpretation of the data.  Plan was outlined and discussed in details with the housestaff.  Decision making of high complexity  Action taken for acute disease activity to reflect the level of care provided:  - medication reconciliation  - review laboratory data  The patient seen and examined. I discussed the case with neurology and the son. Mental status improved during the day. Followup with neurology. Echocardiogram was reviewed. Start physical therapy per dermatology consult for anemia and thrombocytopenia

## 2020-07-01 ENCOUNTER — TRANSCRIPTION ENCOUNTER (OUTPATIENT)
Age: 65
End: 2020-07-01

## 2020-07-01 DIAGNOSIS — R25.1 TREMOR, UNSPECIFIED: ICD-10-CM

## 2020-07-01 LAB
A1C WITH ESTIMATED AVERAGE GLUCOSE RESULT: 7.1 % — HIGH (ref 4–5.6)
ALBUMIN SERPL ELPH-MCNC: 3.4 G/DL — SIGNIFICANT CHANGE UP (ref 3.3–5)
ALP SERPL-CCNC: 60 U/L — SIGNIFICANT CHANGE UP (ref 40–120)
ALT FLD-CCNC: 13 U/L — SIGNIFICANT CHANGE UP (ref 10–45)
ANION GAP SERPL CALC-SCNC: 10 MMOL/L — SIGNIFICANT CHANGE UP (ref 5–17)
AST SERPL-CCNC: 11 U/L — SIGNIFICANT CHANGE UP (ref 10–40)
BILIRUB SERPL-MCNC: 0.2 MG/DL — SIGNIFICANT CHANGE UP (ref 0.2–1.2)
BUN SERPL-MCNC: 17 MG/DL — SIGNIFICANT CHANGE UP (ref 7–23)
CALCIUM SERPL-MCNC: 9.1 MG/DL — SIGNIFICANT CHANGE UP (ref 8.4–10.5)
CHLORIDE SERPL-SCNC: 107 MMOL/L — SIGNIFICANT CHANGE UP (ref 96–108)
CO2 SERPL-SCNC: 29 MMOL/L — SIGNIFICANT CHANGE UP (ref 22–31)
CREAT SERPL-MCNC: 0.97 MG/DL — SIGNIFICANT CHANGE UP (ref 0.5–1.3)
CULTURE RESULTS: NO GROWTH — SIGNIFICANT CHANGE UP
ESTIMATED AVERAGE GLUCOSE: 157 MG/DL — HIGH (ref 68–114)
GLUCOSE BLDC GLUCOMTR-MCNC: 131 MG/DL — HIGH (ref 70–99)
GLUCOSE BLDC GLUCOMTR-MCNC: 133 MG/DL — HIGH (ref 70–99)
GLUCOSE BLDC GLUCOMTR-MCNC: 134 MG/DL — HIGH (ref 70–99)
GLUCOSE BLDC GLUCOMTR-MCNC: 163 MG/DL — HIGH (ref 70–99)
GLUCOSE SERPL-MCNC: 137 MG/DL — HIGH (ref 70–99)
HCT VFR BLD CALC: 33.8 % — LOW (ref 34.5–45)
HCV AB S/CO SERPL IA: 0.12 S/CO — SIGNIFICANT CHANGE UP
HCV AB SERPL-IMP: SIGNIFICANT CHANGE UP
HGB BLD-MCNC: 10.2 G/DL — LOW (ref 11.5–15.5)
MAGNESIUM SERPL-MCNC: 1.8 MG/DL — SIGNIFICANT CHANGE UP (ref 1.6–2.6)
MCHC RBC-ENTMCNC: 24.7 PG — LOW (ref 27–34)
MCHC RBC-ENTMCNC: 30.2 GM/DL — LOW (ref 32–36)
MCV RBC AUTO: 81.8 FL — SIGNIFICANT CHANGE UP (ref 80–100)
NRBC # BLD: 0 /100 WBCS — SIGNIFICANT CHANGE UP (ref 0–0)
PCP SPEC-MCNC: SIGNIFICANT CHANGE UP
PLATELET # BLD AUTO: 71 K/UL — LOW (ref 150–400)
POTASSIUM SERPL-MCNC: 4.2 MMOL/L — SIGNIFICANT CHANGE UP (ref 3.5–5.3)
POTASSIUM SERPL-SCNC: 4.2 MMOL/L — SIGNIFICANT CHANGE UP (ref 3.5–5.3)
PROT SERPL-MCNC: 6 G/DL — SIGNIFICANT CHANGE UP (ref 6–8.3)
RBC # BLD: 4.13 M/UL — SIGNIFICANT CHANGE UP (ref 3.8–5.2)
RBC # FLD: 16.5 % — HIGH (ref 10.3–14.5)
SODIUM SERPL-SCNC: 146 MMOL/L — HIGH (ref 135–145)
SPECIMEN SOURCE: SIGNIFICANT CHANGE UP
T4 FREE SERPL-MCNC: 0.53 NG/DL — LOW (ref 0.7–1.48)
TSH SERPL-MCNC: 0.01 UIU/ML — LOW (ref 0.35–4.94)
WBC # BLD: 5.07 K/UL — SIGNIFICANT CHANGE UP (ref 3.8–10.5)
WBC # FLD AUTO: 5.07 K/UL — SIGNIFICANT CHANGE UP (ref 3.8–10.5)

## 2020-07-01 PROCEDURE — 99232 SBSQ HOSP IP/OBS MODERATE 35: CPT | Mod: GC

## 2020-07-01 PROCEDURE — 99232 SBSQ HOSP IP/OBS MODERATE 35: CPT

## 2020-07-01 PROCEDURE — 71045 X-RAY EXAM CHEST 1 VIEW: CPT | Mod: 26

## 2020-07-01 RX ORDER — MAGNESIUM SULFATE 500 MG/ML
1 VIAL (ML) INJECTION ONCE
Refills: 0 | Status: COMPLETED | OUTPATIENT
Start: 2020-07-01 | End: 2020-07-01

## 2020-07-01 RX ADMIN — LIOTHYRONINE SODIUM 25 MICROGRAM(S): 25 TABLET ORAL at 05:46

## 2020-07-01 RX ADMIN — Medication 30 MILLIGRAM(S): at 13:47

## 2020-07-01 RX ADMIN — Medication 325 MILLIGRAM(S): at 13:48

## 2020-07-01 RX ADMIN — Medication 50 MILLIGRAM(S): at 19:30

## 2020-07-01 RX ADMIN — ATORVASTATIN CALCIUM 20 MILLIGRAM(S): 80 TABLET, FILM COATED ORAL at 22:07

## 2020-07-01 RX ADMIN — Medication 5 MILLIGRAM(S): at 22:07

## 2020-07-01 RX ADMIN — ARIPIPRAZOLE 10 MILLIGRAM(S): 15 TABLET ORAL at 13:47

## 2020-07-01 RX ADMIN — Medication 100 GRAM(S): at 10:02

## 2020-07-01 RX ADMIN — Medication 5 MILLIGRAM(S): at 10:02

## 2020-07-01 RX ADMIN — Medication 1000 UNIT(S): at 13:47

## 2020-07-01 RX ADMIN — ENOXAPARIN SODIUM 40 MILLIGRAM(S): 100 INJECTION SUBCUTANEOUS at 22:07

## 2020-07-01 RX ADMIN — Medication 2: at 17:39

## 2020-07-01 RX ADMIN — DIVALPROEX SODIUM 1000 MILLIGRAM(S): 500 TABLET, DELAYED RELEASE ORAL at 22:07

## 2020-07-01 RX ADMIN — QUETIAPINE FUMARATE 400 MILLIGRAM(S): 200 TABLET, FILM COATED ORAL at 22:16

## 2020-07-01 NOTE — CONSULT NOTE ADULT - PROBLEM SELECTOR RECOMMENDATION 9
Exam much improved today; following commands and 5/5 strength in LE  - CT head, lumbar and cervical spine negative for acute findings or compression fractures, respectively  - F/U PT recommendations

## 2020-07-01 NOTE — CONSULT NOTE ADULT - SUBJECTIVE AND OBJECTIVE BOX
65F with PMHx bipolar disorder, ?Parkinson's, HLD, T2DM, anemia, prior UTIs on chronic nitrofurantoin, and hypothyroidism who presents for acute falls. Patient was in usual state of health until 3 days ago when she began falling. Falls are associated with lightheadedness and LE weakness but no dizziness or vertigo. On labs noted to have anemia and thrombocytopenia, heme called to evaluate.     PAST MEDICAL/SURGICAL HISTORY:  Bipolar disorder   Hypothyroid   T2DM (type 2 diabetes mellitus).    SOCIAL Hx:  No smoking, no drugs, lives alone but has home health aid.    FAMILY Hx:  Not pertinent at this time    Home Medications:  ARIPiprazole 10 mg oral tablet: 1 tab(s) orally once a day (2020 12:43)  atorvastatin 20 mg oral tablet: 1 tab(s) orally once a day (:43)  cholecalciferol 1000 intl units (25 mcg) oral capsule: 1 cap(s) orally once a day (2020 12:43)  clonazePAM 0.5 mg oral tablet, disintegratin tab(s) orally 3 times a day, As Needed (:43)  divalproex sodium 500 mg oral delayed release tablet: 2 tab(s) orally once a day (at bedtime) (2020 12:43)  ferrous sulfate 325 mg (65 mg elemental iron) oral tablet: 1 tab(s) orally once a day (:43)  Januvia 100 mg oral tablet: 1 tab(s) orally once a day (2020 12:43)  liothyronine 25 mcg oral tablet: 1 tab(s) orally once a day (2020 12:43)  metFORMIN 1000 mg oral tablet: 1 tab(s) orally 2 times a day (2020 12:43)  nitrofurantoin macrocrystals 50 mg oral capsule: 1 cap(s) orally once a day (2020 12:43)  oxybutynin 10 mg/24 hr oral tablet, extended release: 1 tab(s) orally once a day (2020 12:43)  PARoxetine 30 mg oral tablet: 1 tab(s) orally once a day (2020 12:43)  QUEtiapine 200 mg oral tablet: 2 tab(s) orally once a day (at bedtime) (2020 12:43)  Vascepa 1 g oral capsule: 2 cap(s) orally 2 times a day (2020 12:43)    Allergies  No Known Allergies    Vital Signs Last 24 Hrs  T(C): 36.7 (2020 13:51), Max: 36.9 (2020 06:32)  T(F): 98.1 (2020 13:51), Max: 98.5 (2020 06:51)  HR: 67 (2020 13:51) (55 - 89)  BP: 125/80 (2020 13:51) (106/60 - 125/80)  BP(mean): --  RR: 18 (2020 13:51) (17 - 19)  SpO2: 95% (2020 13:51) (94% - 96%)    Constitutional: WDWN resting comfortably in bed; NAD on 2L NC  Head: NC/AT  Eyes: PERRL, EOMI, clear conjunctiva  ENT: no nasal discharge; uvula midline, no oropharyngeal erythema or exudates; MMM  Neck: supple; no JVD  Respiratory: CTA B/L; no W/R/R  Cardiac: +S1/S2; RRR; no M/R/G  Gastrointestinal: soft, NT/ND; no rebound or guarding; +BSx4  Back: spine midline, no midline tenderness; no CVAT B/L  Extremities: WWP, no clubbing or cyanosis; no peripheral edema  Musculoskeletal: Limited ROM LE at hip, see neuro exam; no joint swelling, tenderness or erythema  Vascular: 2+ radial and pedal pulses  Dermatologic: skin warm, dry and intact; no rashes, wounds, or scars  Neurologic: AAOx1-2 (self, location); CNII-XII grossly intact; moves all extremities. LE with 3/5 strength at the hip. No saddle anesthesia. Rectal tone intact. Sensation intact bilaterally  in UE and LE; notable RUE shaking/trembling throughout exam  Psychiatric: affect and characteristics of appearance, verbalizations, behaviors are appropriate    Labs:  WBC 5, Hgb 10.2, plt 71  Cr 0.97, LFTs wnl    Imaging:  Reviewed

## 2020-07-01 NOTE — PROGRESS NOTE ADULT - PROBLEM SELECTOR PLAN 2
- As above  - Patient mental status improved compared to last night per son, however not back to baseline  - Currently aaox1-2, on presentation was somnolent, reportedly at baseline aaox2-3  - Plan as above workup for fall. Also may have some symptoms in setting of hypoxia, see below - As above  - Patient mental status improved in ED compared to previously per son, however reportedly not back to baseline aaox2-3  - denies alcohol use, drug screen negative  - somnolent on exam  - per neuro, could be due to post-ictal state  - Plan as above workup for fall. Also may have some symptoms in setting of hypoxia, see below - As above  - Patient mental status improved in ED compared to previously per son, however reportedly not back to baseline aaox2-3  - denies alcohol use, drug screen negative  - somnolent on exam  - per neuro, could be due to post-ictal state  - Plan:  as above workup for fall. Also may have some symptoms in setting of hypoxia, see below

## 2020-07-01 NOTE — DISCHARGE NOTE PROVIDER - PROVIDER TOKENS
FREE:[LAST:[Vidal],PHONE:[phone]],FAX:[(   )    -],SCHEDULEDAPPT:[07/08/2020],ESTABLISHEDPATIENT:[T]]

## 2020-07-01 NOTE — DISCHARGE NOTE PROVIDER - NSDCCPCAREPLAN_GEN_ALL_CORE_FT
PRINCIPAL DISCHARGE DIAGNOSIS  Diagnosis: Fall  Assessment and Plan of Treatment: You came to the hospital because you were falling at home. We looked at your medications and saw that one of your medications, clonazepam has a tendency to cause falls in the older population. We stopped this medication and your strength and mental status returned to baseline.      SECONDARY DISCHARGE DIAGNOSES  Diagnosis: Thrombocytopenia  Assessment and Plan of Treatment: We did a blood test while you were in the hospital and saw that your platelets were low. You were not complaining of any signs of bleeding that could be a problem with this. We recommend you follow up with a hematologist for this.    Diagnosis: Anemia  Assessment and Plan of Treatment: We did a blood test while you were in the hospital and saw that your hemoglobin was low. You were not complaining of any signs of bleeding that could cause this. We recommend you follow up with a hematologist for this.    Diagnosis: Sinus bradycardia  Assessment and Plan of Treatment: Your heart rate was a little slow while you were in the hospital. We did an EKG and it looked like your heart had a normal rhythm but it was just a little slow. You were not complaining of any symptoms associated with decreased heart rate. We are recommending you follow up with your primary care doctor/cardiologist for this.    Diagnosis: Altered mental status  Assessment and Plan of Treatment: You were confused when you first came to the hospital. We looked at your medications and saw that one of your medications, clonazepam has a tendency to cause confusion in the older population. We stopped this medication and your strength and mental status returned to baseline.

## 2020-07-01 NOTE — DISCHARGE NOTE PROVIDER - CARE PROVIDER_API CALL
Vidal,   Phone: [phone]  Fax: (   )    -  Established Patient  Scheduled Appointment: 07/08/2020

## 2020-07-01 NOTE — CONSULT NOTE ADULT - SUBJECTIVE AND OBJECTIVE BOX
Patient is a 65y old  Female who presents with a chief complaint of fall (2020 15:03)       HPI:  This is a 65F with PMHx bipolar disorder, ?Parkinson's, HLD, T2DM, anemia, prior UTIs on chronic nitrofurantoin, and hypothyroidism who presents for acute falls. Patient was in usual state of health until 3 days ago when she began falling. Falls are associated with lightheadedness and LE weakness but no dizziness or vertigo. Patient is accompanied by son. Reporting that in the past this has occurred several years ago at which time patient was evaluated and was told the falls were due to weakness. Patient had not lost consciousness or had any head trauma. She did go to another ED in the [facility] 3 days ago when this first occurred, but she was discharged from the ED and told she needed physical therapy. EMS was called today because patient fell 3 times more and son was concerned. Patient lives alone but has home health aids and nursing that assist her. She reports she has felt LE weakness but denies chest pain, shortness of breath, headaches, nausea, vomiting. She had an episode of bladder incontinence while being examined but has not had frequency, urgency, dysuria, or incontinence prior to this episode. She denies any bowel incontinence. At home and prior to this weakness, patient was able to reportedly ambulate without assistive devices.     In ED: T 98F,  --> 62, /82, RR 20 sating 88% on RA --> 97% on 2L NC. Labs notable for lactate 3.7. CT imaging of head and neck negative. Patient received 2L NS in ED. (2020 08:38)      PAST MEDICAL & SURGICAL HISTORY:  Bipolar disorder  Hypothyroid  T2DM (type 2 diabetes mellitus)      MEDICATIONS  (STANDING):  ARIPiprazole 10 milliGRAM(s) Oral daily  atorvastatin 20 milliGRAM(s) Oral at bedtime  cholecalciferol 1000 Unit(s) Oral daily  dextrose 5%. 1000 milliLiter(s) (50 mL/Hr) IV Continuous <Continuous>  dextrose 50% Injectable 12.5 Gram(s) IV Push once  dextrose 50% Injectable 25 Gram(s) IV Push once  dextrose 50% Injectable 25 Gram(s) IV Push once  diVALproex DR 1000 milliGRAM(s) Oral at bedtime  enoxaparin Injectable 40 milliGRAM(s) SubCutaneous every 24 hours  ferrous    sulfate 325 milliGRAM(s) Oral daily  insulin lispro (HumaLOG) corrective regimen sliding scale   SubCutaneous Before meals and at bedtime  liothyronine 25 MICROGram(s) Oral daily  magnesium sulfate  IVPB 1 Gram(s) IV Intermittent once  nitrofurantoin macrocrystals (MACRODANTIN) 50 milliGRAM(s) Oral every 24 hours  oxybutynin 5 milliGRAM(s) Oral every 12 hours  PARoxetine 30 milliGRAM(s) Oral daily  QUEtiapine 400 milliGRAM(s) Oral at bedtime    MEDICATIONS  (PRN):  dextrose 40% Gel 15 Gram(s) Oral once PRN Blood Glucose LESS THAN 70 milliGRAM(s)/deciliter  glucagon  Injectable 1 milliGRAM(s) IntraMuscular once PRN Glucose LESS THAN 70 milligrams/deciliter      FAMILY HISTORY:      CBC Full  -  ( 2020 07:07 )  WBC Count : 5.07 K/uL  RBC Count : 4.13 M/uL  Hemoglobin : 10.2 g/dL  Hematocrit : 33.8 %  Platelet Count - Automated : 71 K/uL  Mean Cell Volume : 81.8 fl  Mean Cell Hemoglobin : 24.7 pg  Mean Cell Hemoglobin Concentration : 30.2 gm/dL  Auto Neutrophil # : x  Auto Lymphocyte # : x  Auto Monocyte # : x  Auto Eosinophil # : x  Auto Basophil # : x  Auto Neutrophil % : x  Auto Lymphocyte % : x  Auto Monocyte % : x  Auto Eosinophil % : x  Auto Basophil % : x      -    146<H>  |  107  |  17  ----------------------------<  137<H>  4.2   |  29  |  0.97    Ca    9.1      2020 07:07  Mg     1.8     -    TPro  6.0  /  Alb  3.4  /  TBili  0.2  /  DBili  x   /  AST  11  /  ALT  13  /  AlkPhos  60  07-      Urinalysis Basic - ( 2020 06:50 )    Color: Yellow / Appearance: Clear / S.025 / pH: x  Gluc: x / Ketone: NEGATIVE  / Bili: Negative / Urobili: 0.2 E.U./dL   Blood: x / Protein: NEGATIVE mg/dL / Nitrite: NEGATIVE   Leuk Esterase: NEGATIVE / RBC: x / WBC x   Sq Epi: x / Non Sq Epi: x / Bacteria: x          Radiology:    < from: Xray Pelvis 2 views (20 @ 05:03) >  EXAM:  XR PELVIS-1 OR 2 VIEWS                          PROCEDURE DATE:  2020          INTERPRETATION:   X-RAY PELVIS    Two views    HISTORY: fall    PRIOR STUDIES: No prior studies are available for comparison.    FINDINGS:  Normal bony mineralization.  No AVN of femoral head. Hip joint spaces are preserved.  No fracture.  Normal sacroiliac joints. Osteitis pubis.    IMPRESSION:   No fracture.          < from: CT Head No Cont (20 @ 05:33) >    EXAM:  CT BRAIN                          PROCEDURE DATE:  2020          INTERPRETATION:  Keven LIZARRAGA M.D., have reviewed the images and agree with the preliminary report with the following modifications:    1. Nodular, polypoidmucosal thickening within the sphenoid sinus containing intrinsic calcific density. This may represent fungal infection or chronic sinus disease with inspissation.  2. Diminished attenuation within periventricular white matter consistent with chronicmicroangiopathic disease.  3. Hyperostosis frontalis interna.    INDICATIONS: Fall.    TECHNIQUE: Serial axial images were obtained from the skull base to the vertex without the use of intravenous contrast. Sagittal and coronal images were reformatted.     COMPARISON EXAMINATION: None.    FINDINGS:    VENTRICLES AND SULCI: Parenchymal volume is commensurate with patient age.   INTRA-AXIAL: No intracranial mass, acute hemorrhage, midline shift or acute transcortical infarct is seen.  EXTRA-AXIAL: No extra-axial fluid collection is present.   VISUALIZED SINUSES: No air-fluid levels are identified.   VISUALIZED MASTOIDS: Clear.  CALVARIUM: Normal.  MISCELLANEOUS: None.    IMPRESSION:   No acute intracranial hemorrhage or calvarial fracture.        < from: CT Cervical Spine No Cont (20 @ 06:39) >  EXAM:  CT CERVICAL SPINE                          PROCEDURE DATE:  2020          INTERPRETATION:  Keven LIZARRAGA M.D., have reviewed the images and agree with the preliminary report with the following modifications:    1. Evaluation is limited by patient motion.   2. No significant prevertebral soft tissue swelling or splaying of the spinous processes, and craniovertebral junction is normal.  3. Multilevel degenerative osteoarthritis including foraminal narrowing at the C4-C5 and C5-C6 levels, right more than left, at both levels, and no significant stenosis at the other visualized levels.  4. Multilevel degenerative disc disease with disc-osteophyte complex from C3-C4 level through to the C6-C7 levels inclusive. No focal disc herniation identified or central canal stenosis.    CERVICAL SPINE CT WITHOUT CONTRAST dated 2020 6:39 AM     CLINICAL STATEMENT: Fall.     TECHNIQUE: Contiguous noncontrast transaxial CT images were obtained through the cervical spine. Reconstructions were then created in the axial, sagittal, and coronal planes.    COMPARISON: None.    FINDINGS:    The cervical alignment is maintained without spondylolisthesis. No acute fracture is identified. There is mild multilevel vertebral body height loss and disc space narrowing. There is mild nonspecific prevertebral soft tissue thickening. No obvious signs of infection or associated injury.     No large disc herniations are seen. There is no significant bony canal. Moderate right and mild left neural foraminal narrowing at the C4-C5 and C5-C6 levels. Additional levels of mild neural foraminal narrowing are seen.    IMPRESSION:    1. No acute fractures.   2. Mild nonspecific prevertebral soft tissue thickening. No obvious signs of infection or injury.         < from: CT Lumbar Spine No Cont (20 @ 05:33) >  EXAM:  CT LUMBAR SPINE                          PROCEDURE DATE:  2020          INTERPRETATION:  I, Keven Cooper MD, have reviewed the images and agree with the preliminary report the following modifications:    1. Multilevel degenerative osteoarthritis is present in the lumbar spine in which findings include ligamentum flavum thickening and facet arthropathy with vacuum phenomenon noted within the facet joints at the L4-L5 level and L5-S1 level bilaterally.  2. Multilevel degenerative disc disease is present including vacuum phenomenon within the L5-S1 disc space as well as T10-T11.  3. On the coronal reformations, there is slight curvature in the lower lumbar spine, convex to the right.  4. L3-L4: diffuse disc bulge with mild left foraminal narrowing. No central canal stenosis.  L4-L5: diffuse disc bulge eccentric to the left with left lateral recess stenosis and left foraminal narrowing. No central canal stenosis.  L5-S1: left paracentral disc protrusion on backgroundof diffuse disc bulge with mild ventral effacement of the thecal sac. No foraminal stenosis or central canal stenosis. MR could be obtained for further characterization on an outpatient basis.    PROCEDURE: CT Lumbar spine without contrast    INDICATION: Fall.    TECHNIQUE:  Multiple axial sections were obtained from the thoracolumbar junction through the sacrum. Sagittal and coronal reformats were obtained from the axial data set. The images were reviewed in soft tissue and bone windows.    COMPARISON: None.    FINDINGS: The CT exam demonstrates the lumbar alignment to be intact. Anterior compression deformity seen at the superior endplate of T11 which appears chronic in nature. No surrounding soft tissue swelling or acute fracture lines. Vertebral bodies are otherwise preserved in height. Mild disc space narrowing at the T10-T11 with disc vacuum phenomenon seen. Disc spaces are otherwise intact. No acute fractures are seen. No acute fractures.     At the L1/2 level, there is no disc herniation. There is no spinal canal stenosis or neural foramen narrowing.    At the L2/3 level, there is no disc herniation. There is no spinal canal stenosis or neural foramen narrowing.    At the L3/4 level, there is a tiny disc bulge. There is no spinal canal stenosis or neural foramen narrowing.    At the L4/5 level, there is a tiny disc bulge. There is no spinal canal stenosis or neural foramen narrowing.    At the L5/S1 level, there is no disc herniation. There is no spinal canal stenosis or neural foramen narrowing.    IMPRESSION:  1. No acute fractures.    2. Chronic compression deformity at T11.             Vital Signs Last 24 Hrs  T(C): 36.9 (2020 06:51), Max: 37.1 (2020 19:01)  T(F): 98.5 (2020 06:51), Max: 98.7 (2020 19:01)  HR: 61 (2020 06:51) (52 - 89)  BP: 106/69 (2020 06:51) (106/60 - 161/84)  BP(mean): --  RR: 19 (2020 06:51) (17 - 19)  SpO2: 95% (2020 06:51) (94% - 96%)    REVIEW OF SYSTEMS: per HPI        Physical Exam: frail 66 yo  woman lying in bed, somnolent , difficult to arouse, opens eyes with sternal rub, EEG leads in place    Head: normocephalic, atraumatic    Eyes: PERRLA, EOMI, no nystagmus, sclera anicteric    ENT: nasal discharge, uvula midline, no oropharyngeal erythema/exudate    Neck: supple, negative JVD, negative carotid bruits, no thyromegaly    Chest: CTA bilaterally, neg wheeze/ rhonchi/ rales/ crackles/ egophany    Cardiovascular: regular rate and rhythm, neg murmurs/rubs/gallops    Abdomen: soft, non distended, non tender to palpation in all 4 quadrants, negative rebound/guarding, normal bowel sounds    Extremities: WWP, neg cyanosis/clubbing/edema, negative calf tenderness to palpation, negative Vishal's sign    Musculoskeletal:      :     Neurologic Exam:    somnolent , opens eyes with sternal rub      Motor Exam:    Upper Extremities:     RIght:   suboptimal effort > 3/5      Left :     suboptimal effort > 3/5    Lower Extremities:                 Right:   suboptimal effort > 3/5                 Left:      suboptimal effort > 3/5                 Sensory:    unable to assess sec to AMS                     DTR:             = biceps/     triceps/     brachioradialis                      = patella/   medial hamstring/ankle                      neg clonus                      neg Babinski                          Gait:  not tested        PM&R Impression:    1) deconditioned  2) no focal weakness  3) AMS/ s/p fall    Plan:    1) Physical therapy focusing on therapeutic exercises, bed mobility/transfer out of bed evaluation, progressive ambulation with assistive devices prn.    2) Anticipated Disposition Plan/Recs: anticipate subacute rehab placement

## 2020-07-01 NOTE — PHYSICAL THERAPY INITIAL EVALUATION ADULT - PERTINENT HX OF CURRENT PROBLEM, REHAB EVAL
65F  who presents for acute falls. Patient was in usual state of health until 3 days ago when she began falling. Falls are associated with lightheadedness and LE weakness but no dizziness or vertigo. Patient is accompanied by son.

## 2020-07-01 NOTE — PHYSICAL THERAPY INITIAL EVALUATION ADULT - GENERAL OBSERVATIONS, REHAB EVAL
Received supine in NAD, denies pain +O2 NC 2L, Sp02 95% on RA, EEG, IV hep, primafit. left as found +bed alarm, RN Zohra aware, call bell, desat to 88% on RA

## 2020-07-01 NOTE — PROGRESS NOTE ADULT - PROBLEM SELECTOR PLAN 7
- Hx T2DM with reported hypoglycemic episodes including on prior hospital presentations for falls  - SSI  - Hold home metformin and januvia, f/u A1c in AM    #Lactic acidosis  - Patient is perfusing, appears to be mentating improved compared to prior, trend to clear, no concern for acute infection at this time unless hemodynamics change or a new source of infection is identified - Hx T2DM with reported hypoglycemic episodes including on prior hospital presentations for falls  - SSI  - Hold home metformin and januvia    #Lactic acidosis  - Patient is perfusing, appears to be mentating improved compared to prior, trend to clear, no concern for acute infection at this time unless hemodynamics change or a new source of infection is identified

## 2020-07-01 NOTE — PROGRESS NOTE ADULT - SUBJECTIVE AND OBJECTIVE BOX
CC: Patient is a 65y old  Female who presents with a chief complaint of fall (2020 09:19)      OVERNIGHT EVENTS: No acute events overnight.    SUBJECTIVE / INTERVAL HPI: Patient seen and examined at bedside. Pt is very somnolent and able to respond to questions.     ROS: negative unless otherwise stated above.    VITAL SIGNS:  Vital Signs Last 24 Hrs  T(C): 36.9 (2020 06:51), Max: 37.1 (2020 19:01)  T(F): 98.5 (2020 06:51), Max: 98.7 (2020 19:01)  HR: 61 (2020 06:51) (52 - 89)  BP: 106/69 (2020 06:51) (106/60 - 161/76)  BP(mean): --  RR: 19 (2020 06:51) (17 - 19)  SpO2: 95% (2020 06:51) (94% - 96%)    PHYSICAL EXAM:    General: WDWN  HEENT: NC/AT; PERRL, anicteric sclera; MMM  Neck: supple  Cardiovascular: +S1/S2; RRR  Respiratory: CTA B/L; no W/R/R  Gastrointestinal: soft, NT/ND; +BSx4  Extremities: WWP; no edema, clubbing or cyanosis  Vascular: 2+ radial, DP/PT pulses B/L  Neurological: AAOx3; no focal deficits    MEDICATIONS:  MEDICATIONS  (STANDING):  ARIPiprazole 10 milliGRAM(s) Oral daily  atorvastatin 20 milliGRAM(s) Oral at bedtime  cholecalciferol 1000 Unit(s) Oral daily  dextrose 5%. 1000 milliLiter(s) (50 mL/Hr) IV Continuous <Continuous>  dextrose 50% Injectable 12.5 Gram(s) IV Push once  dextrose 50% Injectable 25 Gram(s) IV Push once  dextrose 50% Injectable 25 Gram(s) IV Push once  diVALproex DR 1000 milliGRAM(s) Oral at bedtime  enoxaparin Injectable 40 milliGRAM(s) SubCutaneous every 24 hours  ferrous    sulfate 325 milliGRAM(s) Oral daily  insulin lispro (HumaLOG) corrective regimen sliding scale   SubCutaneous Before meals and at bedtime  liothyronine 25 MICROGram(s) Oral daily  nitrofurantoin macrocrystals (MACRODANTIN) 50 milliGRAM(s) Oral every 24 hours  oxybutynin 5 milliGRAM(s) Oral every 12 hours  PARoxetine 30 milliGRAM(s) Oral daily  QUEtiapine 400 milliGRAM(s) Oral at bedtime    MEDICATIONS  (PRN):  dextrose 40% Gel 15 Gram(s) Oral once PRN Blood Glucose LESS THAN 70 milliGRAM(s)/deciliter  glucagon  Injectable 1 milliGRAM(s) IntraMuscular once PRN Glucose LESS THAN 70 milligrams/deciliter      ALLERGIES:  Allergies    No Known Allergies    Intolerances        LABS:                        10.2   5.07  )-----------( 71       ( 2020 07:07 )             33.8     07-01    146<H>  |  107  |  17  ----------------------------<  137<H>  4.2   |  29  |  0.97    Ca    9.1      2020 07:07  Mg     1.8     07-01    TPro  6.0  /  Alb  3.4  /  TBili  0.2  /  DBili  x   /  AST  11  /  ALT  13  /  AlkPhos  60  07-01    PT/INR - ( 2020 04:12 )   PT: 12.4 sec;   INR: 1.04          PTT - ( 2020 04:12 )  PTT:31.2 sec  Urinalysis Basic - ( 2020 06:50 )    Color: Yellow / Appearance: Clear / S.025 / pH: x  Gluc: x / Ketone: NEGATIVE  / Bili: Negative / Urobili: 0.2 E.U./dL   Blood: x / Protein: NEGATIVE mg/dL / Nitrite: NEGATIVE   Leuk Esterase: NEGATIVE / RBC: x / WBC x   Sq Epi: x / Non Sq Epi: x / Bacteria: x      CAPILLARY BLOOD GLUCOSE      POCT Blood Glucose.: 131 mg/dL (2020 09:15)      RADIOLOGY & ADDITIONAL TESTS: Reviewed. CC: Patient is a 65y old  Female who presents with a chief complaint of fall (2020 09:19)      OVERNIGHT EVENTS: No acute events overnight.    SUBJECTIVE / INTERVAL HPI: Patient seen and examined at bedside. Pt is very somnolent and unable to respond to questions.     ROS: negative unless otherwise stated above.    VITAL SIGNS:  Vital Signs Last 24 Hrs  T(C): 36.9 (2020 06:51), Max: 37.1 (2020 19:01)  T(F): 98.5 (2020 06:51), Max: 98.7 (2020 19:01)  HR: 61 (2020 06:51) (52 - 89)  BP: 106/69 (2020 06:51) (106/60 - 161/76)  BP(mean): --  RR: 19 (2020 06:51) (17 - 19)  SpO2: 95% (2020 06:51) (94% - 96%)    PHYSICAL EXAM:    General: WDWN, somnolent, not able to respond to questions  Neck: supple  Cardiovascular: +S1/S2; RRR  Respiratory: CTA B/L; no W/R/R. exam limited by mental status  Gastrointestinal: soft, NT/ND; +BSx4  Extremities: WWP; no edema, clubbing or cyanosis  Vascular: 2+ radial, DP/PT pulses B/L  Neurological: somnolent. opens eyes to verbal stimuli, moves limbs to verbal stimuli, incomprehensible sounds    MEDICATIONS:  MEDICATIONS  (STANDING):  ARIPiprazole 10 milliGRAM(s) Oral daily  atorvastatin 20 milliGRAM(s) Oral at bedtime  cholecalciferol 1000 Unit(s) Oral daily  dextrose 5%. 1000 milliLiter(s) (50 mL/Hr) IV Continuous <Continuous>  dextrose 50% Injectable 12.5 Gram(s) IV Push once  dextrose 50% Injectable 25 Gram(s) IV Push once  dextrose 50% Injectable 25 Gram(s) IV Push once  diVALproex DR 1000 milliGRAM(s) Oral at bedtime  enoxaparin Injectable 40 milliGRAM(s) SubCutaneous every 24 hours  ferrous    sulfate 325 milliGRAM(s) Oral daily  insulin lispro (HumaLOG) corrective regimen sliding scale   SubCutaneous Before meals and at bedtime  liothyronine 25 MICROGram(s) Oral daily  nitrofurantoin macrocrystals (MACRODANTIN) 50 milliGRAM(s) Oral every 24 hours  oxybutynin 5 milliGRAM(s) Oral every 12 hours  PARoxetine 30 milliGRAM(s) Oral daily  QUEtiapine 400 milliGRAM(s) Oral at bedtime    MEDICATIONS  (PRN):  dextrose 40% Gel 15 Gram(s) Oral once PRN Blood Glucose LESS THAN 70 milliGRAM(s)/deciliter  glucagon  Injectable 1 milliGRAM(s) IntraMuscular once PRN Glucose LESS THAN 70 milligrams/deciliter      ALLERGIES:  Allergies    No Known Allergies    Intolerances        LABS:                        10.2   5.07  )-----------( 71       ( 2020 07:07 )             33.8     07-01    146<H>  |  107  |  17  ----------------------------<  137<H>  4.2   |  29  |  0.97    Ca    9.1      2020 07:07  Mg     1.8     07-01    TPro  6.0  /  Alb  3.4  /  TBili  0.2  /  DBili  x   /  AST  11  /  ALT  13  /  AlkPhos  60  07-01    PT/INR - ( 2020 04:12 )   PT: 12.4 sec;   INR: 1.04          PTT - ( 2020 04:12 )  PTT:31.2 sec  Urinalysis Basic - ( 2020 06:50 )    Color: Yellow / Appearance: Clear / S.025 / pH: x  Gluc: x / Ketone: NEGATIVE  / Bili: Negative / Urobili: 0.2 E.U./dL   Blood: x / Protein: NEGATIVE mg/dL / Nitrite: NEGATIVE   Leuk Esterase: NEGATIVE / RBC: x / WBC x   Sq Epi: x / Non Sq Epi: x / Bacteria: x      CAPILLARY BLOOD GLUCOSE      POCT Blood Glucose.: 131 mg/dL (2020 09:15)      RADIOLOGY & ADDITIONAL TESTS: Reviewed.

## 2020-07-01 NOTE — DISCHARGE NOTE PROVIDER - NSDCFUADDAPPT_GEN_ALL_CORE_FT
Your primary care doctor will call you to make an appointment on 7/8/20. Your primary care doctor will call you to make an appointment on 7/8/20.    Please make an appointment with hematologist/oncologist.

## 2020-07-01 NOTE — DISCHARGE NOTE PROVIDER - HOSPITAL COURSE
#Discharge: do not delete        Patient is a 65F with PMHx bipolar disorder, ?Parkinson's, HLD, T2DM, anemia, prior UTIs on chronic nitrofurantoin, and hypothyroidism who presents for acute falls in the context of polypharmacy and acute hypoxic respiratory failue requiring 2L NC from baseline RA. Pt also has reported change in mental status per her son. Pt has had episodes of bradycardia while in the hospital with HR in 50s but no acute EKG changes.        Problem List/Main Diagnoses (system-based):     Inpatient treatment course:     New medications:     Labs to be followed outpatient:     Exam to be followed outpatient: #Discharge: do not delete        Patient is a 65F with PMHx bipolar disorder, ?Parkinson's, HLD, T2DM, anemia, prior UTIs on chronic nitrofurantoin, and hypothyroidism who presents for acute falls in the context of polypharmacy and acute hypoxic respiratory failue requiring 2L NC from baseline RA complicated by episodes of asymptomatic sinus bradycardia and anemia with thrombocytopenia.        Problem List/Main Diagnoses (system-based):    #Falls    Strength and AMS returned to baseline with removal of clonazepam from medical regimen    #Acute Hypoxic respiratory failure    Pt required 2L of NC to maintain >94% saturation but comfortable on RA without extra work of breathing or change in mental status. Echo shows normal EF, CXR only positive for small left pleural effusion and possible mild pulmonary edema without signs of volume overload on exam.    #bradycardia    Asymptomatic throughout stay. EKG showed sinus rhythm without acute changes.    #Anemia/Thrombocytopenia    Stable throughout stay without symptoms of bleeding or fatigue        New medications: none    Labs to be followed outpatient: Hgb, platelets    Exam to be followed outpatient: lung exam #Discharge: do not delete        Patient is a 65F with PMHx bipolar disorder, ?Parkinson's, HLD, T2DM, anemia, prior UTIs on chronic nitrofurantoin, and hypothyroidism who presents for acute falls in the context of polypharmacy and acute hypoxic respiratory failue requiring 2L NC from baseline RA complicated by episodes of asymptomatic sinus bradycardia and anemia with thrombocytopenia.        Problem List/Main Diagnoses (system-based):    #Falls    Strength and AMS returned to baseline with removal of clonazepam from medical regimen. Pt seen by PT who recommended [facility].    #Acute Hypoxic respiratory failure    Pt required 2L of NC to maintain >94% saturation but comfortable on RA without extra work of breathing or change in mental status. Echo shows normal EF, CXR only positive for small left pleural effusion and possible mild pulmonary edema without signs of volume overload on exam.    #bradycardia    Asymptomatic throughout stay. EKG showed sinus rhythm without acute changes.    #Anemia/Thrombocytopenia    Stable throughout stay without symptoms of bleeding or fatigue            New medications: none    Labs to be followed outpatient: Hgb, platelets    Exam to be followed outpatient: lung exam

## 2020-07-01 NOTE — DISCHARGE NOTE PROVIDER - NSDCHC_MEDRECSTATUS_GEN_ALL_CORE
Update read and appreciated.  Will discuss possible medication changes during f/u visit.  No new recommendations at this time.     Admission Reconciliation is Completed  Discharge Reconciliation is Not Complete Admission Reconciliation is Completed  Discharge Reconciliation is Completed

## 2020-07-01 NOTE — PHYSICAL THERAPY INITIAL EVALUATION ADULT - IMPAIRMENTS FOUND, PT EVAL
gait, locomotion, and balance/aerobic capacity/endurance/poor safety awareness/ventilation and respiration/gas exchange

## 2020-07-01 NOTE — PROGRESS NOTE ADULT - PROBLEM SELECTOR PLAN 1
- Patient presents for 5 falls in the last 3 days associated with lightheadedness and LE weakness. There are no signs of cord compression on exam. There is reported LE weakness of unclear duration.  - Differential is wide. Most likely causes are polypharmacy in this patient with multiple medications that can cause somnolence vs orthostatic hypotension. Found to be orthostatic on exam.  -Cardiogenic cause is less likely given patient's EKG without significant abnormalities and Trop negative x1 and has no anginal symptoms. Echo shows normal EF  -Syncope is not described.   -Neurogenic causes possible, CT head without stroke and history is not consistent with seizure activity. May be in setting of hypoglycemia as patient on Januvia outpatient. Less likely infectious as patient without s/s infection  - Check orthostatics  - Obtain echocardiogram (also in setting of SOB)  - CT lumbar and cervical spine without acute compression fractures  - CT head negative for any findings  - PT recs for disposition, anticipate [facility] Patient presents for 5 falls in the last 3 days associated with lightheadedness and LE weakness. There are no signs of cord compression on exam.   - Differential is wide. Most likely causes are polypharmacy in this patient with multiple medications that can cause somnolence vs orthostatic hypotension. Found to be orthostatic on exam.  -Cardiogenic cause is less likely given patient's EKG without significant abnormalities and Trop negative x1 with no anginal symptoms. Echo shows normal EF  -Syncope is not described.   -Neurogenic causes possible, CT head without stroke and history is not consistent with seizure activity. May be in setting of hypoglycemia as patient on Januvia outpatient, however glucose and A1c inconsistent with hypoglycemia. Less likely infectious as patient without s/s infection. CT lumbar and cervical spine without acute compression fractures. CT head negative for stroke or bleed, although MRI more sensitive for ischemic stroke.  Plan:  - hold home clonazepam  - not going to administer IVF for orthostatics and hyperNa due to pulmonary edema and respiratory status  - call pharm/PCP for med rec  - f/u [facility]EEG Patient presents for 5 falls in the last 3 days associated with lightheadedness and LE weakness. There are no signs of cord compression on exam.   - Differential is wide. Most likely causes are polypharmacy in this patient with multiple medications that can cause somnolence vs orthostatic hypotension. Found to be orthostatic on exam. PCP reports concern for double dosing of medications.  -Cardiogenic cause is less likely given patient's EKG without significant abnormalities and Trop negative x1 with no anginal symptoms. Echo shows normal EF  -Syncope is not described.   -Neurogenic causes possible, CT head without stroke and history is not consistent with seizure activity. May be in setting of hypoglycemia as patient on Januvia outpatient, however glucose and A1c inconsistent with hypoglycemia. Less likely infectious as patient without s/s infection. CT lumbar and cervical spine without acute compression fractures. CT head negative for stroke or bleed, although MRI more sensitive for ischemic stroke.  Plan:  - hold home clonazepam  - not going to administer IVF for orthostatics and hyperNa due to pulmonary edema and respiratory status  - call pharm/PCP for med rec  - f/u [facility]EEG

## 2020-07-01 NOTE — PHYSICAL THERAPY INITIAL EVALUATION ADULT - GAIT DEVIATIONS NOTED, PT EVAL
intiially attempts to ambulate without assist and without AD, 1 mod LOB posteriorly requiring min A x 2 to recover, unable to navigate straight path, mod assist to manuever rolling walker around obstacles, highly unsteady, dec safety awareness/decreased jesika/decreased step length

## 2020-07-01 NOTE — DISCHARGE NOTE PROVIDER - NSDCFUADDINST_GEN_ALL_CORE_FT
Please review the medicine reconciliation to see which medications to take from now on.    If symptoms persist or worsen, contact your Primary Care Provider or return to the Emergency Department.

## 2020-07-01 NOTE — PROVIDER CONTACT NOTE (OTHER) - SITUATION
Pt c/o 10/10 stabbing, chest pain that comes and goes and has been present for 24 hours. Worsened by cough, does not radiate.

## 2020-07-01 NOTE — DISCHARGE NOTE PROVIDER - NSDCMRMEDTOKEN_GEN_ALL_CORE_FT
ARIPiprazole 10 mg oral tablet: 1 tab(s) orally once a day  atorvastatin 20 mg oral tablet: 1 tab(s) orally once a day  cholecalciferol 1000 intl units (25 mcg) oral capsule: 1 cap(s) orally once a day  clonazePAM 0.5 mg oral tablet, disintegratin tab(s) orally 3 times a day, As Needed  divalproex sodium 500 mg oral delayed release tablet: 2 tab(s) orally once a day (at bedtime)  ferrous sulfate 325 mg (65 mg elemental iron) oral tablet: 1 tab(s) orally once a day  Januvia 100 mg oral tablet: 1 tab(s) orally once a day  liothyronine 25 mcg oral tablet: 1 tab(s) orally once a day  metFORMIN 1000 mg oral tablet: 1 tab(s) orally 2 times a day  nitrofurantoin macrocrystals 50 mg oral capsule: 1 cap(s) orally once a day  oxybutynin 10 mg/24 hr oral tablet, extended release: 1 tab(s) orally once a day  PARoxetine 30 mg oral tablet: 1 tab(s) orally once a day  QUEtiapine 200 mg oral tablet: 2 tab(s) orally once a day (at bedtime)  Vascepa 1 g oral capsule: 2 cap(s) orally 2 times a day ARIPiprazole 10 mg oral tablet: 1 tab(s) orally once a day  atorvastatin 20 mg oral tablet: 1 tab(s) orally once a day  cholecalciferol 1000 intl units (25 mcg) oral capsule: 1 cap(s) orally once a day  divalproex sodium 500 mg oral delayed release tablet: 2 tab(s) orally once a day (at bedtime)  ferrous sulfate 325 mg (65 mg elemental iron) oral tablet: 1 tab(s) orally once a day  Januvia 100 mg oral tablet: 1 tab(s) orally once a day  liothyronine 25 mcg oral tablet: 1 tab(s) orally once a day  metFORMIN 1000 mg oral tablet: 1 tab(s) orally 2 times a day  nitrofurantoin macrocrystals 50 mg oral capsule: 1 cap(s) orally once a day  oxybutynin 10 mg/24 hr oral tablet, extended release: 1 tab(s) orally once a day  PARoxetine 30 mg oral tablet: 1 tab(s) orally once a day  polyethylene glycol 3350 oral powder for reconstitution: 17 gram(s) orally every 24 hours  QUEtiapine 200 mg oral tablet: 2 tab(s) orally once a day (at bedtime)  Vascepa 1 g oral capsule: 2 cap(s) orally 2 times a day

## 2020-07-01 NOTE — CONSULT NOTE ADULT - PROBLEM SELECTOR RECOMMENDATION 2
Likely multifactorial   - AAO x 3 on 7/1  - Potential cause Polypharmacy and/or drug interactions (on high-dose seroquel, depakote) and anticholinergic agents (oxybutynin)  - Consider psych consult regarding home regimen for bipolar depression; obtain collateral regarding Seroquel dosing from outpatient psychiatrist  - EEG to assess post-ictal state as cause of altered mental status; f/u read  - TSH and T4 levels low; recommend increasing liothyronine dose (25 mcg to 50 mcg pending cardiac history) Likely multifactorial   - AAO x 3 on 7/1  - Potential cause Polypharmacy and/or drug interactions (on high-dose seroquel, depakote) and anticholinergic agents (oxybutynin)  - Consider psych consult regarding home regimen for bipolar depression; obtain collateral regarding Seroquel dosing from outpatient psychiatrist  - EEG to assess post-ictal state as cause of altered mental status; f/u read  - TSH and T4 levels low; recommend increasing liothyronine dose (25 mcg to 50 mcg pending cardiac history). also consider endo consult.

## 2020-07-01 NOTE — PROGRESS NOTE ADULT - PROBLEM SELECTOR PLAN 4
- Patient presenting with possible hypoxia, in ED noted to have spo2 88% on RA, however then noted to have positional improvement to 95%. Placed on 2L NC  - CXR with decreased lung volumes, possible fluid overload  - Will obtain echo to assess for reduced EF  - Consider lasix if SOB worsens as patient received 2L fluid in ED  - Some atelectasis as well, give incentive spirometer  - Wean o2 as tolerated - Patient presenting with hypoxia, in ED noted to have spo2 88% on RA, however then noted to have positional improvement to 95%. Placed on 2L NC  - CXR with decreased lung volumes, possible fluid overload  - echo shows normal EF, pt non edematous on exam  - Consider lasix if SOB worsens as patient received 2L fluid in ED and showing possible fluid overload on CXR  - Wean o2 as tolerated

## 2020-07-01 NOTE — CONSULT NOTE ADULT - SUBJECTIVE AND OBJECTIVE BOX
HPI: Ms. Gruber is a 65 year old female with PMHx of bipolar disorder, HLD, T2DM, anemia, prior UTIs on chronic nitrofurantoin, and hypothyroidism who presents for acute falls. She was accompanied by her son and she states she feels weak. She has suffered multiple falls within the last 3 days. She has been evaluated in the past for falls and it was attributed to weakness. She was seen for a similar problem at an ED in the [place] and was told she needs physical therapy.     Ms. Gruber states that she is sometimes "more sleepy than usual" that she attributes to her psychiatric medications, specifically Seroquel. She denies any headaches, vision changes, weakness upon interview.     Vital Signs Last 24 Hrs  T(C): 36.7 (2020 13:51), Max: 37.1 (2020 19:01)  T(F): 98.1 (2020 13:51), Max: 98.7 (2020 19:01)  HR: 67 (2020 13:51) (52 - 89)  BP: 125/80 (2020 13:51) (106/60 - 161/76)  RR: 18 (2020 13:51) (17 - 19)  SpO2: 95% (2020 13:51) (94% - 96%)    PAST MEDICAL & SURGICAL HISTORY:  Bipolar disorder  Hypothyroid  T2DM (type 2 diabetes mellitus)    FAMILY HISTORY:    SOCIAL HISTORY:  Denies smoking, drinking, or drug use    ROS: Negative unless otherwise noted    MEDICATIONS  (STANDING):  ARIPiprazole 10 milliGRAM(s) Oral daily  atorvastatin 20 milliGRAM(s) Oral at bedtime  cholecalciferol 1000 Unit(s) Oral daily  dextrose 5%. 1000 milliLiter(s) (50 mL/Hr) IV Continuous <Continuous>  dextrose 50% Injectable 12.5 Gram(s) IV Push once  dextrose 50% Injectable 25 Gram(s) IV Push once  dextrose 50% Injectable 25 Gram(s) IV Push once  diVALproex DR 1000 milliGRAM(s) Oral at bedtime  enoxaparin Injectable 40 milliGRAM(s) SubCutaneous every 24 hours  ferrous    sulfate 325 milliGRAM(s) Oral daily  insulin lispro (HumaLOG) corrective regimen sliding scale   SubCutaneous Before meals and at bedtime  liothyronine 25 MICROGram(s) Oral daily  nitrofurantoin macrocrystals (MACRODANTIN) 50 milliGRAM(s) Oral every 24 hours  oxybutynin 5 milliGRAM(s) Oral every 12 hours  PARoxetine 30 milliGRAM(s) Oral daily  QUEtiapine 400 milliGRAM(s) Oral at bedtime    MEDICATIONS  (PRN):  dextrose 40% Gel 15 Gram(s) Oral once PRN Blood Glucose LESS THAN 70 milliGRAM(s)/deciliter  glucagon  Injectable 1 milliGRAM(s) IntraMuscular once PRN Glucose LESS THAN 70 milligrams/deciliter    Allergies    No Known Allergies    Intolerances      Physical exam:  General: Awake, eyes open, answering questions with some hesitation  Cardiovascular: Regular rate and rhythm, no murmurs  Pulmonary: Anterior breath sounds clear bilaterally  Extremities: Radial and DP pulses +2, no edema    perrl, +oculocephalics.  Following commands appropriately, action tremor present, 5/5 strength in lower extremity    POCT Blood Glucose.: 113 mg/dL (2020 11:14)    LABS:                         10.2   5.07  )-----------( 71       ( 2020 07:07 )             33.8     07-01    146<H>  |  107  |  17  ----------------------------<  137<H>  4.2   |  29  |  0.97    Ca    9.1      2020 07:07  Mg     1.8     07-01    TPro  6.0  /  Alb  3.4  /  TBili  0.2  /  DBili  x   /  AST  11  /  ALT  13  /  AlkPhos  60  07-01    PT/INR - ( 2020 04:12 )   PT: 12.4 sec;   INR: 1.04          PTT - ( 2020 04:12 )  PTT:31.2 sec  Urinalysis Basic - ( 2020 06:50 )    Color: Yellow / Appearance: Clear / S.025 / pH: x  Gluc: x / Ketone: NEGATIVE  / Bili: Negative / Urobili: 0.2 E.U./dL   Blood: x / Protein: NEGATIVE mg/dL / Nitrite: NEGATIVE   Leuk Esterase: NEGATIVE / RBC: x / WBC x   Sq Epi: x / Non Sq Epi: x / Bacteria: x      CARDIAC MARKERS ( 2020 04:12 )  x     / <0.01 ng/mL / 72 U/L / x     / 2.6 ng/mL            RADIOLOGY, EKG & ADDITIONAL TESTS: Reviewed.

## 2020-07-02 LAB
ALBUMIN SERPL ELPH-MCNC: 3.4 G/DL — SIGNIFICANT CHANGE UP (ref 3.3–5)
ALP SERPL-CCNC: 59 U/L — SIGNIFICANT CHANGE UP (ref 40–120)
ALT FLD-CCNC: 13 U/L — SIGNIFICANT CHANGE UP (ref 10–45)
ANION GAP SERPL CALC-SCNC: 10 MMOL/L — SIGNIFICANT CHANGE UP (ref 5–17)
APTT BLD: 32.4 SEC — SIGNIFICANT CHANGE UP (ref 27.5–35.5)
AST SERPL-CCNC: 12 U/L — SIGNIFICANT CHANGE UP (ref 10–40)
BILIRUB SERPL-MCNC: 0.3 MG/DL — SIGNIFICANT CHANGE UP (ref 0.2–1.2)
BUN SERPL-MCNC: 20 MG/DL — SIGNIFICANT CHANGE UP (ref 7–23)
CALCIUM SERPL-MCNC: 9.6 MG/DL — SIGNIFICANT CHANGE UP (ref 8.4–10.5)
CHLORIDE SERPL-SCNC: 104 MMOL/L — SIGNIFICANT CHANGE UP (ref 96–108)
CO2 SERPL-SCNC: 29 MMOL/L — SIGNIFICANT CHANGE UP (ref 22–31)
CREAT SERPL-MCNC: 0.92 MG/DL — SIGNIFICANT CHANGE UP (ref 0.5–1.3)
FERRITIN SERPL-MCNC: 64 NG/ML — SIGNIFICANT CHANGE UP (ref 15–150)
FIBRINOGEN PPP-MCNC: 297 MG/DL — SIGNIFICANT CHANGE UP (ref 258–438)
FOLATE SERPL-MCNC: 4.8 NG/ML — SIGNIFICANT CHANGE UP
GLUCOSE BLDC GLUCOMTR-MCNC: 117 MG/DL — HIGH (ref 70–99)
GLUCOSE BLDC GLUCOMTR-MCNC: 122 MG/DL — HIGH (ref 70–99)
GLUCOSE BLDC GLUCOMTR-MCNC: 154 MG/DL — HIGH (ref 70–99)
GLUCOSE BLDC GLUCOMTR-MCNC: 287 MG/DL — HIGH (ref 70–99)
GLUCOSE SERPL-MCNC: 117 MG/DL — HIGH (ref 70–99)
HAPTOGLOB SERPL-MCNC: 161 MG/DL — SIGNIFICANT CHANGE UP (ref 34–200)
HCT VFR BLD CALC: 35.4 % — SIGNIFICANT CHANGE UP (ref 34.5–45)
HGB BLD-MCNC: 10.7 G/DL — LOW (ref 11.5–15.5)
INR BLD: 1.09 — SIGNIFICANT CHANGE UP (ref 0.88–1.16)
IRON SATN MFR SERPL: 20 % — SIGNIFICANT CHANGE UP (ref 14–50)
IRON SATN MFR SERPL: 58 UG/DL — SIGNIFICANT CHANGE UP (ref 30–160)
LDH SERPL L TO P-CCNC: 157 U/L — SIGNIFICANT CHANGE UP (ref 50–242)
MAGNESIUM SERPL-MCNC: 1.8 MG/DL — SIGNIFICANT CHANGE UP (ref 1.6–2.6)
MCHC RBC-ENTMCNC: 24.7 PG — LOW (ref 27–34)
MCHC RBC-ENTMCNC: 30.2 GM/DL — LOW (ref 32–36)
MCV RBC AUTO: 81.6 FL — SIGNIFICANT CHANGE UP (ref 80–100)
NRBC # BLD: 0 /100 WBCS — SIGNIFICANT CHANGE UP (ref 0–0)
PHOSPHATE SERPL-MCNC: 3.7 MG/DL — SIGNIFICANT CHANGE UP (ref 2.5–4.5)
PLATELET # BLD AUTO: 84 K/UL — LOW (ref 150–400)
POTASSIUM SERPL-MCNC: 4.3 MMOL/L — SIGNIFICANT CHANGE UP (ref 3.5–5.3)
POTASSIUM SERPL-SCNC: 4.3 MMOL/L — SIGNIFICANT CHANGE UP (ref 3.5–5.3)
PROT SERPL-MCNC: 6.3 G/DL — SIGNIFICANT CHANGE UP (ref 6–8.3)
PROTHROM AB SERPL-ACNC: 13 SEC — SIGNIFICANT CHANGE UP (ref 10.6–13.6)
RBC # BLD: 4.34 M/UL — SIGNIFICANT CHANGE UP (ref 3.8–5.2)
RBC # FLD: 16.3 % — HIGH (ref 10.3–14.5)
RETICS #: 62.2 K/UL — SIGNIFICANT CHANGE UP (ref 25–125)
RETICS/RBC NFR: 1.5 % — SIGNIFICANT CHANGE UP (ref 0.5–2.5)
SODIUM SERPL-SCNC: 143 MMOL/L — SIGNIFICANT CHANGE UP (ref 135–145)
T3 SERPL-MCNC: 71 NG/DL — LOW (ref 80–200)
T3FREE SERPL-MCNC: 1.86 PG/ML — SIGNIFICANT CHANGE UP (ref 1.8–4.6)
TIBC SERPL-MCNC: 292 UG/DL — SIGNIFICANT CHANGE UP (ref 220–430)
TRANSFERRIN SERPL-MCNC: 251 MG/DL — SIGNIFICANT CHANGE UP (ref 200–360)
UIBC SERPL-MCNC: 234 UG/DL — SIGNIFICANT CHANGE UP (ref 110–370)
VIT B12 SERPL-MCNC: 533 PG/ML — SIGNIFICANT CHANGE UP (ref 232–1245)
WBC # BLD: 5.93 K/UL — SIGNIFICANT CHANGE UP (ref 3.8–10.5)
WBC # FLD AUTO: 5.93 K/UL — SIGNIFICANT CHANGE UP (ref 3.8–10.5)

## 2020-07-02 PROCEDURE — 95720 EEG PHY/QHP EA INCR W/VEEG: CPT

## 2020-07-02 PROCEDURE — 99231 SBSQ HOSP IP/OBS SF/LOW 25: CPT

## 2020-07-02 PROCEDURE — 71045 X-RAY EXAM CHEST 1 VIEW: CPT | Mod: 26

## 2020-07-02 PROCEDURE — 99232 SBSQ HOSP IP/OBS MODERATE 35: CPT | Mod: GC

## 2020-07-02 RX ORDER — POLYETHYLENE GLYCOL 3350 17 G/17G
17 POWDER, FOR SOLUTION ORAL EVERY 24 HOURS
Refills: 0 | Status: DISCONTINUED | OUTPATIENT
Start: 2020-07-02 | End: 2020-07-03

## 2020-07-02 RX ORDER — MAGNESIUM SULFATE 500 MG/ML
1 VIAL (ML) INJECTION ONCE
Refills: 0 | Status: COMPLETED | OUTPATIENT
Start: 2020-07-02 | End: 2020-07-02

## 2020-07-02 RX ADMIN — POLYETHYLENE GLYCOL 3350 17 GRAM(S): 17 POWDER, FOR SOLUTION ORAL at 09:20

## 2020-07-02 RX ADMIN — Medication 5 MILLIGRAM(S): at 09:20

## 2020-07-02 RX ADMIN — Medication 50 MILLIGRAM(S): at 17:08

## 2020-07-02 RX ADMIN — Medication 6: at 11:54

## 2020-07-02 RX ADMIN — QUETIAPINE FUMARATE 400 MILLIGRAM(S): 200 TABLET, FILM COATED ORAL at 22:35

## 2020-07-02 RX ADMIN — DIVALPROEX SODIUM 1000 MILLIGRAM(S): 500 TABLET, DELAYED RELEASE ORAL at 22:35

## 2020-07-02 RX ADMIN — ATORVASTATIN CALCIUM 20 MILLIGRAM(S): 80 TABLET, FILM COATED ORAL at 22:35

## 2020-07-02 RX ADMIN — Medication 1000 UNIT(S): at 11:24

## 2020-07-02 RX ADMIN — Medication 5 MILLIGRAM(S): at 22:36

## 2020-07-02 RX ADMIN — ENOXAPARIN SODIUM 40 MILLIGRAM(S): 100 INJECTION SUBCUTANEOUS at 22:35

## 2020-07-02 RX ADMIN — Medication 2: at 22:35

## 2020-07-02 RX ADMIN — ARIPIPRAZOLE 10 MILLIGRAM(S): 15 TABLET ORAL at 11:25

## 2020-07-02 RX ADMIN — Medication 100 GRAM(S): at 09:21

## 2020-07-02 RX ADMIN — Medication 325 MILLIGRAM(S): at 11:24

## 2020-07-02 RX ADMIN — LIOTHYRONINE SODIUM 25 MICROGRAM(S): 25 TABLET ORAL at 06:37

## 2020-07-02 RX ADMIN — Medication 30 MILLIGRAM(S): at 11:24

## 2020-07-02 NOTE — PROGRESS NOTE ADULT - SUBJECTIVE AND OBJECTIVE BOX
Physical Medicine and Rehabilitation Progress Note:    Patient is a 65y old  Female who presents with a chief complaint of fall (02 Jul 2020 12:46)      HPI:  This is a 65F with PMHx bipolar disorder, ?Parkinson's, HLD, T2DM, anemia, prior UTIs on chronic nitrofurantoin, and hypothyroidism who presents for acute falls. Patient was in usual state of health until 3 days ago when she began falling. Falls are associated with lightheadedness and LE weakness but no dizziness or vertigo. Patient is accompanied by son. Reporting that in the past this has occurred several years ago at which time patient was evaluated and was told the falls were due to weakness. Patient had not lost consciousness or had any head trauma. She did go to another ED in the [place] 3 days ago when this first occurred, but she was discharged from the ED and told she needed physical therapy. EMS was called today because patient fell 3 times more and son was concerned. Patient lives alone but has home health aids and nursing that assist her. She reports she has felt LE weakness but denies chest pain, shortness of breath, headaches, nausea, vomiting. She had an episode of bladder incontinence while being examined but has not had frequency, urgency, dysuria, or incontinence prior to this episode. She denies any bowel incontinence. At home and prior to this weakness, patient was able to reportedly ambulate without assistive devices.     In ED: T 98F,  --> 62, /82, RR 20 sating 88% on RA --> 97% on 2L NC. Labs notable for lactate 3.7. CT imaging of head and neck negative. Patient received 2L NS in ED. (30 Jun 2020 08:38)                            10.7   5.93  )-----------( 84       ( 02 Jul 2020 05:59 )             35.4       07-02    143  |  104  |  20  ----------------------------<  117<H>  4.3   |  29  |  0.92    Ca    9.6      02 Jul 2020 05:59  Phos  3.7     07-02  Mg     1.8     07-02    TPro  6.3  /  Alb  3.4  /  TBili  0.3  /  DBili  x   /  AST  12  /  ALT  13  /  AlkPhos  59  07-02    Vital Signs Last 24 Hrs  T(C): 36.4 (02 Jul 2020 13:31), Max: 36.9 (02 Jul 2020 05:52)  T(F): 97.5 (02 Jul 2020 13:31), Max: 98.4 (02 Jul 2020 05:52)  HR: 61 (02 Jul 2020 13:31) (50 - 68)  BP: 105/62 (02 Jul 2020 13:31) (100/58 - 143/62)  BP(mean): --  RR: 18 (02 Jul 2020 13:31) (18 - 19)  SpO2: 95% (02 Jul 2020 13:31) (95% - 95%)    MEDICATIONS  (STANDING):  ARIPiprazole 10 milliGRAM(s) Oral daily  atorvastatin 20 milliGRAM(s) Oral at bedtime  cholecalciferol 1000 Unit(s) Oral daily  dextrose 5%. 1000 milliLiter(s) (50 mL/Hr) IV Continuous <Continuous>  dextrose 50% Injectable 12.5 Gram(s) IV Push once  dextrose 50% Injectable 25 Gram(s) IV Push once  dextrose 50% Injectable 25 Gram(s) IV Push once  diVALproex DR 1000 milliGRAM(s) Oral at bedtime  enoxaparin Injectable 40 milliGRAM(s) SubCutaneous every 24 hours  ferrous    sulfate 325 milliGRAM(s) Oral daily  insulin lispro (HumaLOG) corrective regimen sliding scale   SubCutaneous Before meals and at bedtime  liothyronine 25 MICROGram(s) Oral daily  nitrofurantoin macrocrystals (MACRODANTIN) 50 milliGRAM(s) Oral every 24 hours  oxybutynin 5 milliGRAM(s) Oral every 12 hours  PARoxetine 30 milliGRAM(s) Oral daily  polyethylene glycol 3350 17 Gram(s) Oral every 24 hours  QUEtiapine 400 milliGRAM(s) Oral at bedtime    MEDICATIONS  (PRN):  dextrose 40% Gel 15 Gram(s) Oral once PRN Blood Glucose LESS THAN 70 milliGRAM(s)/deciliter  glucagon  Injectable 1 milliGRAM(s) IntraMuscular once PRN Glucose LESS THAN 70 milligrams/deciliter    Currently Undergoing Physical/ Occupational Therapy at bedside.    Initial Functional Status Assessment:   7/1/2020    Previous Level of Function:     · Ambulation Skills	needed assist; home health aide assists patient however patient does not have AD	  · Transfer Skills	needed assist	  · ADL Skills	needed assist	  · Work/Leisure Activity	needed assist; needs device; wheelchair	  · Additional Comments	house without steps, history of multiple falls	    Cognitive Status Examination:   · Orientation	person; place; time; situation	  · Level of Consciousness	alert	  · Follows Commands and Answers Questions	100% of the time	  · Personal Safety and Judgment	impaired; at risk behaviors demonstrated; OOB without assist	  · Short Term Memory	intact	  · Long Term Memory	intact	    Range of Motion Exam:   · Range of Motion Examination	no ROM deficits were identified	  · Active Range of Motion Examination	no Active ROM deficits were identified	    Manual Muscle Testing:   · Manual Muscle Testing Results	grossly assessed due to  functional observation against gravity > 3/5 MMT	    Bed Mobility: Rolling/Turning:     · Level of [place]	supervision	    Bed Mobility: Scooting/Bridging:     · Level of [place]	contact guard	    Bed Mobility: Sit to Supine:     · Level of [place]	supervision	    Bed Mobility: Supine to Sit:     · Level of [place]	supervision	    Transfer: Sit to Stand:     · Level of [place]	minimum assist (75% patients effort)	  · Physical Assist/Nonphysical Assist	nonverbal cues (demo/gestures); 1 person assist	  · Assistive Device	rolling walker	    Transfer: Stand to Sit:     · Level of [place]	contact guard	  · Physical Assist/Nonphysical Assist	verbal cues; nonverbal cues (demo/gestures)	  · Assistive Device	rolling walker	    Gait Skills:     · Level of [place]	moderate assist (50% patients effort)	  · Physical Assist/Nonphysical Assist	1 person assist	  · Assistive Device	rolling walker	  · Gait Distance	50 feet; FIM1	    Gait Analysis:     · Gait Deviations Noted	intiially attempts to ambulate without assist and without AD, 1 mod LOB posteriorly requiring min A x 2 to recover, unable to navigate straight path, mod assist to manuever rolling walker around obstacles, highly unsteady, dec safety awareness; decreased jesika; decreased step length	  · Impairments Contributing to Gait Deviations	impaired balance; decreased strength	    Balance Skills Assessment:     · Sitting Balance: Static	good balance	  · Sitting Balance: Dynamic	good balance	  · Sit-to-Stand Balance	poor plus	  · Standing Balance: Static	poor plus	  · Standing Balance: Dynamic	poor plus	  · Systems Impairment Contributing to Balance Disturbance	cognitive; musculoskeletal; neuromuscular	  · Identified Impairments Contributing to Balance Disturbance	decreased strength	    Clinical Impressions:   · Criteria for Skilled Therapeutic Interventions	impairments found; functional limitations in following categories; rehab potential; anticipated discharge recommendation; therapy frequency	  · Impairments Found (describe specific impairments)	aerobic capacity/endurance; poor safety awareness; gait, locomotion, and balance; ventilation and respiration/gas exchange	  · Functional Limitations in Following Categories (describe specific limitations)	self-care; home management; community/leisure	  · Risk Reduction/Prevention (Describe Specific Areas of risk reduction/prevention)	risk factors	  · Risk Areas	fall	  · Rehab Potential	fair, will monitor progress closely	  · Therapy Frequency	2-3x/week	        PM&R Impression: as above    Current Disposition Plan Recommendations:    subacute rehab placement

## 2020-07-02 NOTE — CONSULT NOTE ADULT - PROBLEM SELECTOR RECOMMENDATION 3
Action tremor seen on exam  - Likely 2/2 psychiatric medication side effects  - Continue to monitor Action tremor seen on exam  - Likely 2/2 psychiatric medication side effects    Pt back at baseline, please call with questions. neurology to s/o.

## 2020-07-02 NOTE — CONSULT NOTE ADULT - PROBLEM SELECTOR RECOMMENDATION 9
Exam much improved today; following commands and 5/5 strength in LE  - CT head, lumbar and cervical spine negative for acute findings or compression fractures, respectively  - F/U PT recommendations; pending discharge

## 2020-07-02 NOTE — CONSULT NOTE ADULT - ASSESSMENT
66 yo F with anemia and thrombocytopenia.     Will order work up- iron panel, b12, folate, hapto, LDH, ya. Orders placed.  Possibly med related.   If work up unremarkable and no improvement over time pt may need a bone marrow biopsy to rule out underlying bone marrow path.  Infectious work up neg.   Will follow with you.   Thank you.    Akhil Napier MD  [facility] [phone]
Ms. Gruber is a 65F with PMHx bipolar disorder, HLD, T2DM, anemia, prior UTIs on chronic nitrofurantoin, and hypothyroidism who presents for acute falls. Consulted neurology for bilateral LE weakness.
Ms. Gruber is a 65F with PMHx bipolar disorder, HLD, T2DM, anemia, prior UTIs on chronic nitrofurantoin, and hypothyroidism who presents for acute falls. Consulted neurology for bilateral LE weakness.
per Internal Medicine     65F with PMHx bipolar disorder, ?Parkinson's, HLD, T2DM, anemia, prior UTIs on chronic nitrofurantoin, and hypothyroidism who presents for acute falls.     Problem/Plan - 1:  ·  Problem: Fall.  Plan: - Patient presents for 5 falls in the last 3 days associated with lightheadedness and LE weakness. There are no signs of cord compression on exam. There is LE weakness and it is unclear how long that has been present  - Differential is wide. Most likely causes are polypharmacy in this patient with multiple medications that can cause somnolence vs orthostatic hypotension. Cardiogenic cause is less likely given patient's EKG without significant abnormalities and Trop negative x1 and has no anginal symptoms. Syncope is not described. Neurogenic causes possible, CT head without stroke and history is not consistent with seizure activity. May be in setting of hypoglycemia as patient on Januvia outpatient. Less likely infectious as patient without s/s infection  - Check orthostatics  - Obtain echocardiogram (also in setting of SOB)  - CT lumbar and cervical spine without acute compression fractures  - CT head negative for any findings  - PT recs for disposition, anticipate [facility].     Problem/Plan - 2:  ·  Problem: Altered mental status.  Plan: - As above  - Patient mental status improved compared to last night per son, however not back to baseline  - Currently aaox1-2, on presentation was somnolent, reportedly at baseline aaox2-3  - Plan as above workup for fall. Also may have some symptoms in setting of hypoxia, see below.     Problem/Plan - 3:  ·  Problem: Weakness of both lower extremities.  Plan: - 3/5 strength in LE, unclear if acute or chronic as poor historian  - As above without concern for acute process  - Neuro consulted for assistance in managing, appreciate recs  - PT.     Problem/Plan - 4:  ·  Problem: Respiratory failure with hypoxia, unspecified chronicity.  Plan: - Patient presenting with possible hypoxia, in ED noted to have spo2 88% on RA, however then noted to have positional improvement to 95%. Placed on 2L NC  - CXR with decreased lung volumes, possible fluid overload  - Will obtain echo to assess for reduced EF  - Consider lasix if SOB worsens as patient received 2L fluid in ED  - Some atelectasis as well, give incentive spirometer  - Wean o2 as tolerated.     Problem/Plan - 5:  ·  Problem: Thrombocytopenia.  Plan: - plt 75 on admission, unknown baseline, obtain collateral from pcp  - trend  - no s/s bleeding.     Problem/Plan - 6:  Problem: Bipolar disorder. Plan: - Hx bipolar d/o  - Continue home medications aripiprazole 10mg daily, valproic acid 1g nightly, paroxetine 30mg daily, and seroquel 400mg daily  - Patient is on klonopin 0.5 mg TID PRN, however will hold this medication in setting of AMS as above  - monitor for s/s benzo withdrawal.    Problem/Plan - 7:  ·  Problem: T2DM (type 2 diabetes mellitus).  Plan: - Hx T2DM with reported hypoglycemic episodes including on prior hospital presentations for falls  - SSI  - Hold home metformin and januvia, f/u A1c in AM    #Lactic acidosis  - Patient is perfusing, appears to be mentating improved compared to prior, trend to clear, no concern for acute infection at this time unless hemodynamics change or a new source of infection is identified.     Problem/Plan - 8:  ·  Problem: Hypothyroid.  Plan: - Continue home liothyronine 25mcg daily.     Problem/Plan - 9:  ·  Problem: Bladder spasms.  Plan: - Continue home oxybutynin 10mg ER therapeutic interchange 5mg daily  - on nitrofurantoin 50mg daily prophy for hx recurrent UTIs, will continue.     Problem/Plan - 10:  Problem: Nutrition, metabolism, and development symptoms. Plan; F; s/p 2L NS in ED  E: Replete PRN  N: DASH CC  FULL CODE  DVT prophy lovenox  D RMF.
Ms. Gruber is a 65F with PMHx bipolar disorder, HLD, T2DM, anemia, prior UTIs on chronic nitrofurantoin, and hypothyroidism who presents for acute falls. Consulted neurology for bilateral LE weakness.

## 2020-07-02 NOTE — CONSULT NOTE ADULT - SUBJECTIVE AND OBJECTIVE BOX
HPI: Ms. Gruber is a 65 year old female with PMHx of bipolar disorder, HLD, T2DM, anemia, prior UTIs on chronic nitrofurantoin, and hypothyroidism who presents for acute falls. She was accompanied by her son and she states she feels weak. She has suffered multiple falls within the last 3 days. She has been evaluated in the past for falls and it was attributed to weakness. She was seen for a similar problem at an ED in the [place] and was told she needs physical therapy.     Ms. Gruber is adamant about going home. She states she is back to her baseline and is not alarmed by her acute change in mental upon admission.     Vital Signs Last 24 Hrs  T(C): 36.9 (02 Jul 2020 05:52), Max: 36.9 (02 Jul 2020 05:52)  T(F): 98.4 (02 Jul 2020 05:52), Max: 98.4 (02 Jul 2020 05:52)  HR: 68 (02 Jul 2020 05:52) (50 - 68)  BP: 100/58 (02 Jul 2020 05:52) (100/58 - 143/62)  RR: 19 (02 Jul 2020 05:52) (18 - 19)  SpO2: 95% (02 Jul 2020 05:52) (95% - 95%)    ROS: Negative unless otherwise noted    MEDICATIONS  (STANDING):  ARIPiprazole 10 milliGRAM(s) Oral daily  atorvastatin 20 milliGRAM(s) Oral at bedtime  cholecalciferol 1000 Unit(s) Oral daily  dextrose 5%. 1000 milliLiter(s) (50 mL/Hr) IV Continuous <Continuous>  dextrose 50% Injectable 12.5 Gram(s) IV Push once  dextrose 50% Injectable 25 Gram(s) IV Push once  dextrose 50% Injectable 25 Gram(s) IV Push once  diVALproex DR 1000 milliGRAM(s) Oral at bedtime  enoxaparin Injectable 40 milliGRAM(s) SubCutaneous every 24 hours  ferrous    sulfate 325 milliGRAM(s) Oral daily  insulin lispro (HumaLOG) corrective regimen sliding scale   SubCutaneous Before meals and at bedtime  liothyronine 25 MICROGram(s) Oral daily  nitrofurantoin macrocrystals (MACRODANTIN) 50 milliGRAM(s) Oral every 24 hours  oxybutynin 5 milliGRAM(s) Oral every 12 hours  PARoxetine 30 milliGRAM(s) Oral daily  QUEtiapine 400 milliGRAM(s) Oral at bedtime    MEDICATIONS  (PRN):  dextrose 40% Gel 15 Gram(s) Oral once PRN Blood Glucose LESS THAN 70 milliGRAM(s)/deciliter  glucagon  Injectable 1 milliGRAM(s) IntraMuscular once PRN Glucose LESS THAN 70 milligrams/deciliter    Allergies    No Known Allergies    Intolerances      Physical exam:  General: Awake, eyes open, answering questions with some hesitation  Cardiovascular: Regular rate and rhythm, no murmurs  Pulmonary: Anterior breath sounds clear bilaterally  Extremities: Radial and DP pulses +2, no edema    perrl, +oculocephalics.  Following commands appropriately, action tremor present, 5/5 strength in lower extremity    POCT Blood Glucose.: 113 mg/dL (30 Jun 2020 11:14)      LABS:                         10.7   5.93  )-----------( 84       ( 02 Jul 2020 05:59 )             35.4     07-02    143  |  104  |  20  ----------------------------<  117<H>  4.3   |  29  |  0.92    Ca    9.6      02 Jul 2020 05:59  Phos  3.7     07-02  Mg     1.8     07-02    TPro  6.3  /  Alb  3.4  /  TBili  0.3  /  DBili  x   /  AST  12  /  ALT  13  /  AlkPhos  59  07-02    PT/INR - ( 02 Jul 2020 05:59 )   PT: 13.0 sec;   INR: 1.09          PTT - ( 02 Jul 2020 05:59 )  PTT:32.4 sec              RADIOLOGY, EKG & ADDITIONAL TESTS: Reviewed.

## 2020-07-02 NOTE — EEG REPORT - NS EEG TEXT BOX
[facility] Department of Neurology  Inpatient Continuous video-Electroencephalography Report    Patient Name:	ART DANIEL    :	1955  MRN:	[id number]    Study Start Date/Time:  2020, 8:29:34 AM  Study End Date/Time:	in progress    Referred by: America Hoffman MD    Brief Clinical History:  ART DANIEL is a 65-year-old woman with altered mental status and tremor.    Diagnosis Code:   R56.9 convulsions/seizure  CPT: 98184 EEG with video 12-26h    Pertinent Medications on Initiation  Depakote ER 1000 mg QHS    Acquisition Details:  Electroencephalography was acquired using a minimum of 21 channels on an Nu-Med Plus Neurology system v 8.5.1 with electrode placement according to the standard International 10-20 system following ACNS (American Clinical Neurophysiology Society) guidelines for Long-Term Video EEG monitoring.  Anterior temporal T1 and T2 electrodes were utilized whenever possible.   The XLTEK automated spike & seizure detections were all reviewed in detail, in addition to extensive portions of raw EEG.    Day 1: 2020 @ 8:29:34 AM to next morning @ 07:00 AM    Background:  continuous, with predominantly alpha and beta frequencies.  Symmetry:  No persistent asymmetries of voltage or frequency.  Posterior Dominant Rhythm:  9 Hz symmetric, well-organized, and well-modulated.  Organization: Normal.  Voltage:  Normal (20+ uV)  Variability: Yes. 		Reactivity: Yes.  N2 sleep: Symmetric, synchronous spindles and K complexes.  Spontaneous Activity:  No epileptiform discharges.  Periodic/rhythmic activity:  None.  Events:  No electrographic seizures or significant clinical events.  Provocations:  Hyperventilation and Photic stimulation: was not performed.    Daily Summary:    Normal EEG, awake and asleep.  No epileptiform activity and no significant clinical events occurred.    Read by:  Ирина Chavez MD

## 2020-07-02 NOTE — CONSULT NOTE ADULT - PROBLEM SELECTOR PROBLEM 2
Altered mental status, unspecified altered mental status type

## 2020-07-02 NOTE — CONSULT NOTE ADULT - PROBLEM SELECTOR RECOMMENDATION 2
Likely multifactorial   - AAO x 3 on 7/1  - Potential cause Polypharmacy and/or drug interactions (on high-dose seroquel, depakote) and anticholinergic agents (oxybutynin)  - Recommend following up with outpatient psychiatry regarding high dose seroquel and drug interactions  - EEG to assess post-ictal state as cause of altered mental status; f/u read  - TSH and T4 levels low; recommend increasing liothyronine dose (25 mcg to 50 mcg pending cardiac history); f/u with PCP regarding increasing Liothyronine

## 2020-07-02 NOTE — PROGRESS NOTE ADULT - SUBJECTIVE AND OBJECTIVE BOX
Interval history:  No overnight events, no acute distress.    65F with PMHx bipolar disorder, ?Parkinson's, HLD, T2DM, anemia, prior UTIs on chronic nitrofurantoin, and hypothyroidism who presents for acute falls. Patient was in usual state of health until 3 days ago when she began falling. Falls are associated with lightheadedness and LE weakness but no dizziness or vertigo. On labs noted to have anemia and thrombocytopenia, heme called to evaluate.     PAST MEDICAL/SURGICAL HISTORY:  Bipolar disorder   Hypothyroid   T2DM (type 2 diabetes mellitus).    SOCIAL Hx:  No smoking, no drugs, lives alone but has home health aid.    FAMILY Hx:  Not pertinent at this time    Home Medications:  ARIPiprazole 10 mg oral tablet: 1 tab(s) orally once a day (2020 12:43)  atorvastatin 20 mg oral tablet: 1 tab(s) orally once a day (2020 12:43)  cholecalciferol 1000 intl units (25 mcg) oral capsule: 1 cap(s) orally once a day (2020 12:43)  clonazePAM 0.5 mg oral tablet, disintegratin tab(s) orally 3 times a day, As Needed (2020 12:43)  divalproex sodium 500 mg oral delayed release tablet: 2 tab(s) orally once a day (at bedtime) (2020 12:43)  ferrous sulfate 325 mg (65 mg elemental iron) oral tablet: 1 tab(s) orally once a day (2020 12:43)  Januvia 100 mg oral tablet: 1 tab(s) orally once a day (2020 12:43)  liothyronine 25 mcg oral tablet: 1 tab(s) orally once a day (2020 12:43)  metFORMIN 1000 mg oral tablet: 1 tab(s) orally 2 times a day (2020 12:43)  nitrofurantoin macrocrystals 50 mg oral capsule: 1 cap(s) orally once a day (2020 12:43)  oxybutynin 10 mg/24 hr oral tablet, extended release: 1 tab(s) orally once a day (2020 12:43)  PARoxetine 30 mg oral tablet: 1 tab(s) orally once a day (2020 12:43)  QUEtiapine 200 mg oral tablet: 2 tab(s) orally once a day (at bedtime) (2020 12:43)  Vascepa 1 g oral capsule: 2 cap(s) orally 2 times a day (2020 12:43)    Allergies  No Known Allergies    Vital Signs Last 24 Hrs  T(C): 36.9 (2020 05:52), Max: 36.9 (2020 05:52)  T(F): 98.4 (2020 05:52), Max: 98.4 (2020 05:52)  HR: 68 (2020 05:52) (50 - 68)  BP: 100/58 (2020 05:52) (100/58 - 143/62)  BP(mean): --  RR: 19 (2020 05:52) (18 - 19)  SpO2: 95% (2020 05:52) (95% - 95%)    Constitutional: WDWN resting comfortably in bed; NAD on 2L NC  Head: NC/AT  Eyes: PERRL, EOMI, clear conjunctiva  ENT: no nasal discharge; uvula midline, no oropharyngeal erythema or exudates; MMM  Neck: supple; no JVD  Respiratory: CTA B/L; no W/R/R  Cardiac: +S1/S2; RRR; no M/R/G  Gastrointestinal: soft, NT/ND; no rebound or guarding; +BSx4  Back: spine midline, no midline tenderness; no CVAT B/L  Extremities: WWP, no clubbing or cyanosis; no peripheral edema  Musculoskeletal: Limited ROM LE at hip, see neuro exam; no joint swelling, tenderness or erythema  Vascular: 2+ radial and pedal pulses  Dermatologic: skin warm, dry and intact; no rashes, wounds, or scars  Neurologic: AAOx1-2 (self, location); CNII-XII grossly intact; moves all extremities. LE with 3/5 strength at the hip. No saddle anesthesia. Rectal tone intact. Sensation intact bilaterally  in UE and LE; notable RUE shaking/trembling throughout exam  Psychiatric: affect and characteristics of appearance, verbalizations, behaviors are appropriate    Labs:  WBC 5.9, Hgb 10.7, plt 84  Cr 0.92, LFTs wnl    Imaging:  Reviewed

## 2020-07-02 NOTE — PROGRESS NOTE ADULT - PROBLEM SELECTOR PLAN 5
- plt 75 on admission, unknown baseline, obtain collateral from pcp  - trend  - no s/s bleeding    #Constipation  -no BM in 2-3 days  -miralax given today - plt 75 on admission, unknown baseline, obtain collateral from pcp  - trend  - no s/s bleeding  #Anemia  -Fe studies normal  -f/u retic  #Constipation  -no BM in 2-3 days  -miralax given today

## 2020-07-02 NOTE — CONSULT NOTE ADULT - ATTENDING COMMENTS
I was physically present for the key portions of the evaluation and managemnent (E/M) service provided.  I agree with the above history, physical, and plan which I have reviewed and edited where appropriate, with the exceptions as per my note.    very lethargic but nonfocal - likely toxic metabolic. advised team to acutely determine etiology of metabolic derrangement such as meds, infection, co2, hypoglycemia, etc. will follow
I was physically present for the key portions of the evaluation and managemnent (E/M) service provided.  I agree with the above history, physical, and plan which I have reviewed and edited where appropriate, with the exceptions as per my note.    Encephalopathy now resolved, likely 2/2 polypharmacy, specifically her psych meds. Also may have abnormal thyroid function and would consider endo consult.    Leg weakness is not currently present.    Please call with questions.
I was physically present for the key portions of the evaluation and managemnent (E/M) service provided.  I agree with the above history, physical, and plan which I have reviewed and edited where appropriate, with the exceptions as per my note.

## 2020-07-02 NOTE — PROGRESS NOTE ADULT - PROBLEM SELECTOR PLAN 1
Patient presents for 5 falls in the last 3 days associated with lightheadedness and LE weakness. There are no signs of cord compression on exam.   - Differential is wide. Most likely causes are polypharmacy in this patient with multiple medications that can cause somnolence vs orthostatic hypotension. Found to be orthostatic on exam. PCP reports concern for double dosing of medications.  -Cardiogenic cause is less likely given patient's EKG without significant abnormalities and Trop negative x1 with no anginal symptoms. Echo shows normal EF  -Syncope is not described.   -Neurogenic causes possible, CT head without stroke and history is not consistent with seizure activity. May be in setting of hypoglycemia as patient on Januvia outpatient, however glucose and A1c inconsistent with hypoglycemia. Less likely infectious as patient without s/s infection. CT lumbar and cervical spine without acute compression fractures. CT head negative for stroke or bleed, although MRI more sensitive for ischemic stroke.  Plan:  - hold home clonazepam  - not going to administer IVF for orthostatics and hyperNa due to pulmonary edema and respiratory status  - call pharm/PCP for med rec  - f/u VEEG

## 2020-07-02 NOTE — PROGRESS NOTE ADULT - PROBLEM SELECTOR PLAN 2
- As above  - Patient mental status improved  - denies alcohol use, drug screen negative  - somnolent on exam  - per neuro, could be due to post-ictal state  - Plan:  as above workup for fall. Also may have some symptoms in setting of hypoxia, see below

## 2020-07-02 NOTE — EEG REPORT - NS EEG TEXT BOX
[facility] Department of Neurology  Inpatient Continuous video-Electroencephalography Report    Patient Name:	ART DANIEL    :	1955  MRN:	[id number]    Study Start Date/Time:  2020, 8:29:34 AM  Study End Date/Time:	2020, 11:43 AM    Referred by: America Hoffman MD    Brief Clinical History:  ART DANIEL is a 65-year-old woman with altered mental status and tremor.    Diagnosis Code:   R56.9 convulsions/seizure  CPT: 62870 EEG with video 12-26h    Pertinent Medications on Initiation  Depakote ER 1000 mg QHS    Acquisition Details:  Electroencephalography was acquired using a minimum of 21 channels on an [employer] system v 8.5.1 with electrode placement according to the standard International 10-20 system following ACNS (American Clinical Neurophysiology Society) guidelines for Long-Term Video EEG monitoring.  Anterior temporal T1 and T2 electrodes were utilized whenever possible.   The [employer] automated spike & seizure detections were all reviewed in detail, in addition to extensive portions of raw EEG.    Day 1: 2020 @ 8:29:34 AM to next morning @ 07:00 AM    Background:  continuous, with predominantly alpha and beta frequencies.  Symmetry:  No persistent asymmetries of voltage or frequency.  Posterior Dominant Rhythm:  9 Hz symmetric, well-organized, and well-modulated.  Organization: Normal.  Voltage:  Normal (20+ uV)  Variability: Yes. 		Reactivity: Yes.  N2 sleep: Symmetric, synchronous spindles and K complexes.  Spontaneous Activity:  No epileptiform discharges.  Periodic/rhythmic activity:  None.  Events:  No electrographic seizures or significant clinical events.  Provocations:  Hyperventilation and Photic stimulation: was not performed.  Daily Summary:    Normal EEG, awake and asleep.  No epileptiform activity and no significant clinical events occurred.    Read by:  Ирина Chavez MD        Daily Updates (from 07:00 am until 07:00 am):    Day 2  2020 @ 7 AM to 2020 @ 11:43 AM    Pertinent medications: no changes    Background:  continuous, with predominantly alpha and beta frequencies.  Symmetry:  No persistent asymmetries of voltage or frequency.  Posterior Dominant Rhythm:  9 Hz symmetric, well-organized, and well-modulated.  Organization: Normal.  Voltage:  Normal (20+ uV)  Variability: Yes. 		Reactivity: Yes.  N2 sleep: Symmetric, synchronous spindles and K complexes.  Spontaneous Activity:  No epileptiform discharges.  Periodic/rhythmic activity:  None.  Events:  No electrographic seizures or significant clinical events.  Provocations:  Hyperventilation and Photic stimulation: was not performed.  Daily Summary:    Normal EEG, awake and asleep.  No epileptiform activity and no significant clinical events occurred.    Read by:  Ирина Chavez MD      FINAL Summary:  Normal continuous av-EEG study.  1)	The EEG remained normal throughout the study.    FINAL Clinical Correlation:  There were no findings of active epilepsy, however this alone does not rule out the diagnosis.            Ирина Chavez MD  Attending Neurologist, Division of Epilepsy

## 2020-07-02 NOTE — PROGRESS NOTE ADULT - PROBLEM SELECTOR PLAN 4
- Patient presenting with hypoxia, in ED noted to have spo2 88% on RA, however then noted to have positional improvement to 95%. Currently requiring 2L NC  - CXR with decreased lung volumes, possible fluid overload  - echo shows normal EF, pt non edematous on exam  - Consider lasix if SOB worsens as patient received 2L fluid in ED and showing possible fluid overload on CXR  - Wean o2 as tolerated

## 2020-07-02 NOTE — PROGRESS NOTE ADULT - SUBJECTIVE AND OBJECTIVE BOX
CC: Patient is a 65y old  Female who presents with a chief complaint of fall (02 Jul 2020 07:47)      OVERNIGHT EVENTS: no acute events    SUBJECTIVE / INTERVAL HPI: Patient seen and examined at bedside. Pt is AAOx3 and answering questions. Currently denying chest pain, SOB, nausea, vomiting, diarrhea. Endorses constipation with last BM 2-3 days ago. Pt also reports not passing gas in the past day.     ROS: negative unless otherwise stated above.    VITAL SIGNS:  Vital Signs Last 24 Hrs  T(C): 36.9 (02 Jul 2020 05:52), Max: 36.9 (02 Jul 2020 05:52)  T(F): 98.4 (02 Jul 2020 05:52), Max: 98.4 (02 Jul 2020 05:52)  HR: 68 (02 Jul 2020 05:52) (50 - 68)  BP: 100/58 (02 Jul 2020 05:52) (100/58 - 143/62)  BP(mean): --  RR: 19 (02 Jul 2020 05:52) (18 - 19)  SpO2: 95% (02 Jul 2020 05:52) (95% - 95%)    PHYSICAL EXAM:    General: WDWN  Neck: supple  Cardiovascular: +S1/S2; RRR  Respiratory: CTA B/L; no W/R/R  Gastrointestinal: soft, NT/ND; +BSx4  Extremities: WWP; no edema, clubbing or cyanosis  Vascular: 2+ radial, DP/PT pulses B/L  Neurological: AAOx3; dysarthria    MEDICATIONS:  MEDICATIONS  (STANDING):  ARIPiprazole 10 milliGRAM(s) Oral daily  atorvastatin 20 milliGRAM(s) Oral at bedtime  cholecalciferol 1000 Unit(s) Oral daily  dextrose 5%. 1000 milliLiter(s) (50 mL/Hr) IV Continuous <Continuous>  dextrose 50% Injectable 12.5 Gram(s) IV Push once  dextrose 50% Injectable 25 Gram(s) IV Push once  dextrose 50% Injectable 25 Gram(s) IV Push once  diVALproex DR 1000 milliGRAM(s) Oral at bedtime  enoxaparin Injectable 40 milliGRAM(s) SubCutaneous every 24 hours  ferrous    sulfate 325 milliGRAM(s) Oral daily  insulin lispro (HumaLOG) corrective regimen sliding scale   SubCutaneous Before meals and at bedtime  liothyronine 25 MICROGram(s) Oral daily  nitrofurantoin macrocrystals (MACRODANTIN) 50 milliGRAM(s) Oral every 24 hours  oxybutynin 5 milliGRAM(s) Oral every 12 hours  PARoxetine 30 milliGRAM(s) Oral daily  polyethylene glycol 3350 17 Gram(s) Oral every 24 hours  QUEtiapine 400 milliGRAM(s) Oral at bedtime    MEDICATIONS  (PRN):  dextrose 40% Gel 15 Gram(s) Oral once PRN Blood Glucose LESS THAN 70 milliGRAM(s)/deciliter  glucagon  Injectable 1 milliGRAM(s) IntraMuscular once PRN Glucose LESS THAN 70 milligrams/deciliter      ALLERGIES:  Allergies    No Known Allergies    Intolerances        LABS:                        10.7   5.93  )-----------( 84       ( 02 Jul 2020 05:59 )             35.4     07-02    143  |  104  |  20  ----------------------------<  117<H>  4.3   |  29  |  0.92    Ca    9.6      02 Jul 2020 05:59  Phos  3.7     07-02  Mg     1.8     07-02    TPro  6.3  /  Alb  3.4  /  TBili  0.3  /  DBili  x   /  AST  12  /  ALT  13  /  AlkPhos  59  07-02    PT/INR - ( 02 Jul 2020 05:59 )   PT: 13.0 sec;   INR: 1.09          PTT - ( 02 Jul 2020 05:59 )  PTT:32.4 sec    CAPILLARY BLOOD GLUCOSE      POCT Blood Glucose.: 117 mg/dL (02 Jul 2020 07:54)      RADIOLOGY & ADDITIONAL TESTS: Reviewed.

## 2020-07-03 ENCOUNTER — TRANSCRIPTION ENCOUNTER (OUTPATIENT)
Age: 65
End: 2020-07-03

## 2020-07-03 VITALS
RESPIRATION RATE: 20 BRPM | TEMPERATURE: 98 F | HEART RATE: 60 BPM | OXYGEN SATURATION: 95 % | DIASTOLIC BLOOD PRESSURE: 68 MMHG | SYSTOLIC BLOOD PRESSURE: 103 MMHG

## 2020-07-03 LAB
ALBUMIN SERPL ELPH-MCNC: 3.5 G/DL — SIGNIFICANT CHANGE UP (ref 3.3–5)
ALP SERPL-CCNC: 60 U/L — SIGNIFICANT CHANGE UP (ref 40–120)
ALT FLD-CCNC: 14 U/L — SIGNIFICANT CHANGE UP (ref 10–45)
ANION GAP SERPL CALC-SCNC: 13 MMOL/L — SIGNIFICANT CHANGE UP (ref 5–17)
AST SERPL-CCNC: 12 U/L — SIGNIFICANT CHANGE UP (ref 10–40)
BILIRUB SERPL-MCNC: 0.3 MG/DL — SIGNIFICANT CHANGE UP (ref 0.2–1.2)
BUN SERPL-MCNC: 22 MG/DL — SIGNIFICANT CHANGE UP (ref 7–23)
CALCIUM SERPL-MCNC: 9.5 MG/DL — SIGNIFICANT CHANGE UP (ref 8.4–10.5)
CHLORIDE SERPL-SCNC: 101 MMOL/L — SIGNIFICANT CHANGE UP (ref 96–108)
CO2 SERPL-SCNC: 26 MMOL/L — SIGNIFICANT CHANGE UP (ref 22–31)
CREAT SERPL-MCNC: 0.88 MG/DL — SIGNIFICANT CHANGE UP (ref 0.5–1.3)
GLUCOSE BLDC GLUCOMTR-MCNC: 116 MG/DL — HIGH (ref 70–99)
GLUCOSE BLDC GLUCOMTR-MCNC: 152 MG/DL — HIGH (ref 70–99)
GLUCOSE SERPL-MCNC: 132 MG/DL — HIGH (ref 70–99)
HCT VFR BLD CALC: 36 % — SIGNIFICANT CHANGE UP (ref 34.5–45)
HGB BLD-MCNC: 11.4 G/DL — LOW (ref 11.5–15.5)
MAGNESIUM SERPL-MCNC: 2 MG/DL — SIGNIFICANT CHANGE UP (ref 1.6–2.6)
MCHC RBC-ENTMCNC: 25.5 PG — LOW (ref 27–34)
MCHC RBC-ENTMCNC: 31.7 GM/DL — LOW (ref 32–36)
MCV RBC AUTO: 80.5 FL — SIGNIFICANT CHANGE UP (ref 80–100)
NRBC # BLD: 0 /100 WBCS — SIGNIFICANT CHANGE UP (ref 0–0)
PHOSPHATE SERPL-MCNC: 4.5 MG/DL — SIGNIFICANT CHANGE UP (ref 2.5–4.5)
PLATELET # BLD AUTO: 98 K/UL — LOW (ref 150–400)
POTASSIUM SERPL-MCNC: 4.1 MMOL/L — SIGNIFICANT CHANGE UP (ref 3.5–5.3)
POTASSIUM SERPL-SCNC: 4.1 MMOL/L — SIGNIFICANT CHANGE UP (ref 3.5–5.3)
PROT SERPL-MCNC: 6.5 G/DL — SIGNIFICANT CHANGE UP (ref 6–8.3)
RBC # BLD: 4.47 M/UL — SIGNIFICANT CHANGE UP (ref 3.8–5.2)
RBC # FLD: 16.3 % — HIGH (ref 10.3–14.5)
SODIUM SERPL-SCNC: 140 MMOL/L — SIGNIFICANT CHANGE UP (ref 135–145)
WBC # BLD: 6.47 K/UL — SIGNIFICANT CHANGE UP (ref 3.8–10.5)
WBC # FLD AUTO: 6.47 K/UL — SIGNIFICANT CHANGE UP (ref 3.8–10.5)

## 2020-07-03 PROCEDURE — 83605 ASSAY OF LACTIC ACID: CPT

## 2020-07-03 PROCEDURE — 82550 ASSAY OF CK (CPK): CPT

## 2020-07-03 PROCEDURE — 83615 LACTATE (LD) (LDH) ENZYME: CPT

## 2020-07-03 PROCEDURE — 82803 BLOOD GASES ANY COMBINATION: CPT

## 2020-07-03 PROCEDURE — 85379 FIBRIN DEGRADATION QUANT: CPT

## 2020-07-03 PROCEDURE — 85610 PROTHROMBIN TIME: CPT

## 2020-07-03 PROCEDURE — 93005 ELECTROCARDIOGRAM TRACING: CPT

## 2020-07-03 PROCEDURE — 97161 PT EVAL LOW COMPLEX 20 MIN: CPT

## 2020-07-03 PROCEDURE — 72131 CT LUMBAR SPINE W/O DYE: CPT

## 2020-07-03 PROCEDURE — 36415 COLL VENOUS BLD VENIPUNCTURE: CPT

## 2020-07-03 PROCEDURE — 84481 FREE ASSAY (FT-3): CPT

## 2020-07-03 PROCEDURE — 84480 ASSAY TRIIODOTHYRONINE (T3): CPT

## 2020-07-03 PROCEDURE — 82553 CREATINE MB FRACTION: CPT

## 2020-07-03 PROCEDURE — 95700 EEG CONT REC W/VID EEG TECH: CPT

## 2020-07-03 PROCEDURE — 95714 VEEG EA 12-26 HR UNMNTR: CPT

## 2020-07-03 PROCEDURE — 72125 CT NECK SPINE W/O DYE: CPT

## 2020-07-03 PROCEDURE — 80307 DRUG TEST PRSMV CHEM ANLYZR: CPT

## 2020-07-03 PROCEDURE — 84295 ASSAY OF SERUM SODIUM: CPT

## 2020-07-03 PROCEDURE — 86850 RBC ANTIBODY SCREEN: CPT

## 2020-07-03 PROCEDURE — 85384 FIBRINOGEN ACTIVITY: CPT

## 2020-07-03 PROCEDURE — 83550 IRON BINDING TEST: CPT

## 2020-07-03 PROCEDURE — 84466 ASSAY OF TRANSFERRIN: CPT

## 2020-07-03 PROCEDURE — U0003: CPT

## 2020-07-03 PROCEDURE — 83690 ASSAY OF LIPASE: CPT

## 2020-07-03 PROCEDURE — 93306 TTE W/DOPPLER COMPLETE: CPT

## 2020-07-03 PROCEDURE — 81003 URINALYSIS AUTO W/O SCOPE: CPT

## 2020-07-03 PROCEDURE — 85045 AUTOMATED RETICULOCYTE COUNT: CPT

## 2020-07-03 PROCEDURE — 83880 ASSAY OF NATRIURETIC PEPTIDE: CPT

## 2020-07-03 PROCEDURE — 83540 ASSAY OF IRON: CPT

## 2020-07-03 PROCEDURE — 80053 COMPREHEN METABOLIC PANEL: CPT

## 2020-07-03 PROCEDURE — 82607 VITAMIN B-12: CPT

## 2020-07-03 PROCEDURE — 70450 CT HEAD/BRAIN W/O DYE: CPT

## 2020-07-03 PROCEDURE — 84100 ASSAY OF PHOSPHORUS: CPT

## 2020-07-03 PROCEDURE — 85027 COMPLETE CBC AUTOMATED: CPT

## 2020-07-03 PROCEDURE — 84439 ASSAY OF FREE THYROXINE: CPT

## 2020-07-03 PROCEDURE — 82728 ASSAY OF FERRITIN: CPT

## 2020-07-03 PROCEDURE — 87086 URINE CULTURE/COLONY COUNT: CPT

## 2020-07-03 PROCEDURE — 82330 ASSAY OF CALCIUM: CPT

## 2020-07-03 PROCEDURE — 82746 ASSAY OF FOLIC ACID SERUM: CPT

## 2020-07-03 PROCEDURE — 83036 HEMOGLOBIN GLYCOSYLATED A1C: CPT

## 2020-07-03 PROCEDURE — 95711 VEEG 2-12 HR UNMONITORED: CPT

## 2020-07-03 PROCEDURE — 82962 GLUCOSE BLOOD TEST: CPT

## 2020-07-03 PROCEDURE — 72170 X-RAY EXAM OF PELVIS: CPT

## 2020-07-03 PROCEDURE — 86880 COOMBS TEST DIRECT: CPT

## 2020-07-03 PROCEDURE — 85025 COMPLETE CBC W/AUTO DIFF WBC: CPT

## 2020-07-03 PROCEDURE — 99285 EMERGENCY DEPT VISIT HI MDM: CPT | Mod: 25

## 2020-07-03 PROCEDURE — 99239 HOSP IP/OBS DSCHRG MGMT >30: CPT

## 2020-07-03 PROCEDURE — 87040 BLOOD CULTURE FOR BACTERIA: CPT

## 2020-07-03 PROCEDURE — 83735 ASSAY OF MAGNESIUM: CPT

## 2020-07-03 PROCEDURE — 86803 HEPATITIS C AB TEST: CPT

## 2020-07-03 PROCEDURE — 84443 ASSAY THYROID STIM HORMONE: CPT

## 2020-07-03 PROCEDURE — 83010 ASSAY OF HAPTOGLOBIN QUANT: CPT

## 2020-07-03 PROCEDURE — 86901 BLOOD TYPING SEROLOGIC RH(D): CPT

## 2020-07-03 PROCEDURE — 85730 THROMBOPLASTIN TIME PARTIAL: CPT

## 2020-07-03 PROCEDURE — 84484 ASSAY OF TROPONIN QUANT: CPT

## 2020-07-03 PROCEDURE — 71045 X-RAY EXAM CHEST 1 VIEW: CPT

## 2020-07-03 PROCEDURE — 84132 ASSAY OF SERUM POTASSIUM: CPT

## 2020-07-03 RX ORDER — POLYETHYLENE GLYCOL 3350 17 G/17G
17 POWDER, FOR SOLUTION ORAL
Qty: 0 | Refills: 0 | DISCHARGE
Start: 2020-07-03

## 2020-07-03 RX ORDER — CLONAZEPAM 1 MG
1 TABLET ORAL
Qty: 0 | Refills: 0 | DISCHARGE

## 2020-07-03 RX ADMIN — Medication 325 MILLIGRAM(S): at 10:52

## 2020-07-03 RX ADMIN — LIOTHYRONINE SODIUM 25 MICROGRAM(S): 25 TABLET ORAL at 05:26

## 2020-07-03 RX ADMIN — ARIPIPRAZOLE 10 MILLIGRAM(S): 15 TABLET ORAL at 10:52

## 2020-07-03 RX ADMIN — POLYETHYLENE GLYCOL 3350 17 GRAM(S): 17 POWDER, FOR SOLUTION ORAL at 08:39

## 2020-07-03 RX ADMIN — Medication 30 MILLIGRAM(S): at 10:52

## 2020-07-03 RX ADMIN — Medication 1000 UNIT(S): at 10:52

## 2020-07-03 RX ADMIN — Medication 5 MILLIGRAM(S): at 08:39

## 2020-07-03 RX ADMIN — Medication 2: at 12:08

## 2020-07-03 NOTE — DISCHARGE NOTE NURSING/CASE MANAGEMENT/SOCIAL WORK - PATIENT PORTAL LINK FT
You can access the FollowMyHealth Patient Portal offered by [facility] by registering at the following website: [email or web address]. By joining [facility]’s FollowMyHealth portal, you will also be able to view your health information using other applications (apps) compatible with our system.

## 2020-07-03 NOTE — PROGRESS NOTE ADULT - PROBLEM SELECTOR PLAN 5
- plt 75 on admission, unknown baseline, obtain collateral from pcp  - trend  - no s/s bleeding  #Anemia  -Fe studies normal  -f/u retic  #Constipation  -no BM in 3-4 days  -cont miralax

## 2020-07-03 NOTE — PROGRESS NOTE ADULT - PROBLEM SELECTOR PLAN 7
- Hx T2DM with reported hypoglycemic episodes including on prior hospital presentations for falls  - SSI  - Hold home metformin and januvia    #Lactic acidosis  - Patient is perfusing, appears to be mentating improved compared to prior, trend to clear, no concern for acute infection at this time unless hemodynamics change or a new source of infection is identified

## 2020-07-03 NOTE — PROGRESS NOTE ADULT - PROBLEM SELECTOR PLAN 9
- Continue home oxybutynin 10mg ER therapeutic interchange 5mg daily  - on nitrofurantoin 50mg daily prophy for hx recurrent UTIs, will continue

## 2020-07-03 NOTE — PROGRESS NOTE ADULT - PROBLEM SELECTOR PLAN 10
F; s/p 2L NS in ED  E: Replete PRN  N: DASH CC  FULL CODE  DVT prophy lovenox  D RMF

## 2020-07-03 NOTE — PROGRESS NOTE ADULT - ATTENDING COMMENTS
Patient seen and examined with house-staff during bedside rounds.  Resident note read, including vitals, physical findings, laboratory data, and radiological reports.   Revisions included below.  Direct personal management at bed side and extensive interpretation of the data.  Plan was outlined and discussed in details with the housestaff.  Decision making of high complexity  Action taken for acute disease activity to reflect the level of care provided:  - medication reconciliation  - review laboratory data  stable  more alert  continue current meds  discussed with heme  dc today  Platelet increased
Patient seen and examined with house-staff during bedside rounds.  Resident note read, including vitals, physical findings, laboratory data, and radiological reports.   Revisions included below.  Direct personal management at bed side and extensive interpretation of the data.  Plan was outlined and discussed in details with the housestaff.  Decision making of high complexity  Action taken for acute disease activity to reflect the level of care provided:  - medication reconciliation  - review laboratory data  she is more alert and coversing  EEG negative for seizure  Hb stable  DC planning
Patient seen and examined with house-staff during bedside rounds.  Resident note read, including vitals, physical findings, laboratory data, and radiological reports.   Revisions included below.  Direct personal management at bed side and extensive interpretation of the data.  Plan was outlined and discussed in details with the housestaff.  Decision making of high complexity  Action taken for acute disease activity to reflect the level of care provided:  - medication reconciliation  - review laboratory data  MS improved and she is more alert.  She was seen by PT and neurology.  Has EEG.  Follow on T3.  T 4 and TSH are low. TSH should be elevated if thryroid hormone levels are low.

## 2020-07-03 NOTE — PROGRESS NOTE ADULT - ASSESSMENT
per Internal Medicine     65F with PMHx bipolar disorder, ?Parkinson's, HLD, T2DM, anemia, prior UTIs on chronic nitrofurantoin, and hypothyroidism who presents for acute falls in the context of polypharmacy and acute hypoxic respiratory failue requiring 2L NC from baseline RA. Pt also has reported change in mental status per her son that has since resolved. Pt has had episodes of bradycardia while in the hospital with HR in 50s but no acute EKG changes.    Problem/Plan - 1:  ·  Problem: Fall.  Plan: Patient presents for 5 falls in the last 3 days associated with lightheadedness and LE weakness. There are no signs of cord compression on exam.   - Differential is wide. Most likely causes are polypharmacy in this patient with multiple medications that can cause somnolence vs orthostatic hypotension. Found to be orthostatic on exam. PCP reports concern for double dosing of medications.  -Cardiogenic cause is less likely given patient's EKG without significant abnormalities and Trop negative x1 with no anginal symptoms. Echo shows normal EF  -Syncope is not described.   -Neurogenic causes possible, CT head without stroke and history is not consistent with seizure activity. May be in setting of hypoglycemia as patient on Januvia outpatient, however glucose and A1c inconsistent with hypoglycemia. Less likely infectious as patient without s/s infection. CT lumbar and cervical spine without acute compression fractures. CT head negative for stroke or bleed, although MRI more sensitive for ischemic stroke.  Plan:  - hold home clonazepam  - not going to administer IVF for orthostatics and hyperNa due to pulmonary edema and respiratory status  - call pharm/PCP for med rec  - f/u VEEG.     Problem/Plan - 2:  ·  Problem: Altered mental status.  Plan: - As above  - Patient mental status improved  - denies alcohol use, drug screen negative  - somnolent on exam  - per neuro, could be due to post-ictal state  - Plan:  as above workup for fall. Also may have some symptoms in setting of hypoxia, see below.     Problem/Plan - 3:  ·  Problem: Weakness of both lower extremities.  Plan: - 3/5 strength in LE, unclear if acute or chronic as poor historian  - As above without concern for acute process  - Neuro consulted for assistance in managing, appreciate recs  - PT.     Problem/Plan - 4:  ·  Problem: Respiratory failure with hypoxia, unspecified chronicity.  Plan: - Patient presenting with hypoxia, in ED noted to have spo2 88% on RA, however then noted to have positional improvement to 95%. Currently requiring 2L NC  - CXR with decreased lung volumes, possible fluid overload  - echo shows normal EF, pt non edematous on exam  - Consider lasix if SOB worsens as patient received 2L fluid in ED and showing possible fluid overload on CXR  - Wean o2 as tolerated.     Problem/Plan - 5:  ·  Problem: Thrombocytopenia.  Plan: - plt 75 on admission, unknown baseline, obtain collateral from pcp  - trend  - no s/s bleeding  #Anemia  -Fe studies normal  -f/u retic  #Constipation  -no BM in 2-3 days  -miralax given today.    Problem/Plan - 6:  Problem: Bipolar disorder. Plan: - Hx bipolar d/o  - Continue home medications aripiprazole 10mg daily, valproic acid 1g nightly, paroxetine 30mg daily, and seroquel 400mg daily  - Patient is on klonopin 0.5 mg TID PRN, however will hold this medication in setting of AMS as above  - monitor for s/s benzo withdrawal.    Problem/Plan - 7:  ·  Problem: T2DM (type 2 diabetes mellitus).  Plan: - Hx T2DM with reported hypoglycemic episodes including on prior hospital presentations for falls  - SSI  - Hold home metformin and januvia    #Lactic acidosis  - Patient is perfusing, appears to be mentating improved compared to prior, trend to clear, no concern for acute infection at this time unless hemodynamics change or a new source of infection is identified.     Problem/Plan - 8:  ·  Problem: Hypothyroid.  Plan: - Continue home liothyronine 25mcg daily.     Problem/Plan - 9:  ·  Problem: Bladder spasms.  Plan: - Continue home oxybutynin 10mg ER therapeutic interchange 5mg daily  - on nitrofurantoin 50mg daily prophy for hx recurrent UTIs, will continue.     Problem/Plan - 10:  Problem: Nutrition, metabolism, and development symptoms. Plan; F; s/p 2L NS in ED  E: Replete PRN  N: DASH CC  FULL CODE  DVT prophy lovenox  D RMF.
64 yo F with anemia and thrombocytopenia.     Work up so far neg.  No iron def or hemolysis found.  Still waiting for b12 and folate levels.  Cytopenias may be related to medication vs hyperthyroidism- TSH is 0.007  If cytopenias do not correct then would recommend a bone marrow biopsy.  Infectious work up neg.   Will follow with you.   Thank you.    Akhil Napier MD  [facility] [phone]
This is a 65F with PMHx bipolar disorder, ?Parkinson's, HLD, T2DM, anemia, prior UTIs on chronic nitrofurantoin, and hypothyroidism who presents for acute falls in the context of polypharmacy and acute hypoxic respiratory failue requiring 2L NC from baseline RA. Pt also has reported change in mental status per her son. Pt has had episodes of bradycardia while in the hospital with HR in 50s but no acute EKG changes.
64 yo F with anemia and thrombocytopenia.     Work up so far neg.  No iron def or hemolysis found.  B12 norm. Folate on the low side. Will give folic acid.   Cytopenias may be related to medication vs hyperthyroidism- TSH is 0.007  If cytopenias do not correct then would recommend a bone marrow biopsy.  Infectious work up neg.   Will follow with you.   Thank you.    Akhil Napier MD  [facility] [phone]
This is a 65F with PMHx bipolar disorder, ?Parkinson's, HLD, T2DM, anemia, prior UTIs on chronic nitrofurantoin, and hypothyroidism who presents for acute falls in the context of polypharmacy and acute hypoxic respiratory failue requiring 2L NC from baseline RA. Pt also has reported change in mental status per her son that has since resolved. Pt has had episodes of bradycardia while in the hospital with HR in 50s but no acute EKG changes.
This is a 65F with PMHx bipolar disorder, ?Parkinson's, HLD, T2DM, anemia, prior UTIs on chronic nitrofurantoin, and hypothyroidism who presents for acute falls in the context of polypharmacy and acute hypoxic respiratory failue requiring 2L NC from baseline RA. Pt also has reported change in mental status per her son that has since resolved. Pt has had episodes of bradycardia while in the hospital with HR in 50s but no acute EKG changes.

## 2020-07-03 NOTE — PROGRESS NOTE ADULT - SUBJECTIVE AND OBJECTIVE BOX
Interval history:  No overnight events, no acute distress.    65F with PMHx bipolar disorder, ?Parkinson's, HLD, T2DM, anemia, prior UTIs on chronic nitrofurantoin, and hypothyroidism who presents for acute falls. Patient was in usual state of health until 3 days ago when she began falling. Falls are associated with lightheadedness and LE weakness but no dizziness or vertigo. On labs noted to have anemia and thrombocytopenia, heme called to evaluate.     PAST MEDICAL/SURGICAL HISTORY:  Bipolar disorder   Hypothyroid   T2DM (type 2 diabetes mellitus).    SOCIAL Hx:  No smoking, no drugs, lives alone but has home health aid.    FAMILY Hx:  Not pertinent at this time    Home Medications:  ARIPiprazole 10 mg oral tablet: 1 tab(s) orally once a day (2020 12:43)  atorvastatin 20 mg oral tablet: 1 tab(s) orally once a day (2020 12:43)  cholecalciferol 1000 intl units (25 mcg) oral capsule: 1 cap(s) orally once a day (2020 12:43)  clonazePAM 0.5 mg oral tablet, disintegratin tab(s) orally 3 times a day, As Needed (2020 12:43)  divalproex sodium 500 mg oral delayed release tablet: 2 tab(s) orally once a day (at bedtime) (2020 12:43)  ferrous sulfate 325 mg (65 mg elemental iron) oral tablet: 1 tab(s) orally once a day (2020 12:43)  Januvia 100 mg oral tablet: 1 tab(s) orally once a day (2020 12:43)  liothyronine 25 mcg oral tablet: 1 tab(s) orally once a day (2020 12:43)  metFORMIN 1000 mg oral tablet: 1 tab(s) orally 2 times a day (2020 12:43)  nitrofurantoin macrocrystals 50 mg oral capsule: 1 cap(s) orally once a day (2020 12:43)  oxybutynin 10 mg/24 hr oral tablet, extended release: 1 tab(s) orally once a day (2020 12:43)  PARoxetine 30 mg oral tablet: 1 tab(s) orally once a day (2020 12:43)  QUEtiapine 200 mg oral tablet: 2 tab(s) orally once a day (at bedtime) (2020 12:43)  Vascepa 1 g oral capsule: 2 cap(s) orally 2 times a day (2020 12:43)    Allergies  No Known Allergies    Vital Signs Last 24 Hrs  T(C): 36.8 (2020 05:56), Max: 36.9 (2020 21:05)  T(F): 98.3 (2020 05:56), Max: 98.5 (2020 21:05)  HR: 60 (2020 05:56) (54 - 61)  BP: 103/68 (2020 05:56) (103/68 - 121/71)  BP(mean): --  RR: 20 (2020 05:56) (17 - 20)  SpO2: 95% (2020 05:56) (95% - 97%)    Constitutional: WDWN resting comfortably in bed; NAD on 2L NC  Head: NC/AT  Eyes: PERRL, EOMI, clear conjunctiva  ENT: no nasal discharge; uvula midline, no oropharyngeal erythema or exudates; MMM  Neck: supple; no JVD  Respiratory: CTA B/L; no W/R/R  Cardiac: +S1/S2; RRR; no M/R/G  Gastrointestinal: soft, NT/ND; no rebound or guarding; +BSx4  Back: spine midline, no midline tenderness; no CVAT B/L  Extremities: WWP, no clubbing or cyanosis; no peripheral edema  Musculoskeletal: Limited ROM LE at hip, see neuro exam; no joint swelling, tenderness or erythema  Vascular: 2+ radial and pedal pulses  Dermatologic: skin warm, dry and intact; no rashes, wounds, or scars  Neurologic: AAOx1-2 (self, location); CNII-XII grossly intact; moves all extremities. LE with 3/5 strength at the hip. No saddle anesthesia. Rectal tone intact. Sensation intact bilaterally  in UE and LE; notable RUE shaking/trembling throughout exam  Psychiatric: affect and characteristics of appearance, verbalizations, behaviors are appropriate    Labs:  WBC 6.4, Hgb 11.4, plt 98  Cr 0.88, LFTs wnl    Imaging:  Reviewed

## 2020-07-03 NOTE — PROGRESS NOTE ADULT - PROBLEM SELECTOR PLAN 3
- 3/5 strength in LE, unclear if acute or chronic as poor historian  - As above without concern for acute process  - Neuro consulted for assistance in managing, appreciate recs  - PT
- LE strength improved, 5/5 throughout  - Neuro consulted for assistance in managing, appreciate recs  - PT
- 3/5 strength in LE, unclear if acute or chronic as poor historian  - As above without concern for acute process  - Neuro consulted for assistance in managing, appreciate recs  - PT

## 2020-07-03 NOTE — PROGRESS NOTE ADULT - PROBLEM SELECTOR PLAN 4
- Patient presenting with hypoxia, in ED noted to have spo2 88% on RA, however then noted to have positional improvement to 95%. Currently requiring 2L NC  - CXR with decreased lung volumes, possible fluid overload, and small left pleural effusion  - crackles on exam    #bradycardia  -EKG shows sinus rhythm  -not complaining of symptoms such as cardiogenic chest pain, dizziness  - echo shows normal EF, pt non edematous on exam  - Consider lasix if SOB worsens as patient received 2L fluid in ED and showing possible fluid overload on CXR  - Wean o2 as tolerated

## 2020-07-03 NOTE — PROGRESS NOTE ADULT - PROBLEM SELECTOR PLAN 1
Patient presents for 5 falls in the last 3 days associated with lightheadedness and LE weakness. There are no signs of cord compression on exam.   - Differential is wide. Most likely causes are polypharmacy in this patient with multiple medications that can cause somnolence vs orthostatic hypotension. Found to be orthostatic on exam. PCP reports concern for double dosing of medications.  -Cardiogenic cause is less likely given patient's EKG without significant abnormalities and Trop negative x1 with no anginal symptoms. Echo shows normal EF  -Syncope is not described.   -Neurogenic causes possible, CT head without stroke and history is not consistent with seizure activity with normal VEEG. May be in setting of hypoglycemia as patient on Januvia outpatient, however glucose and A1c inconsistent with hypoglycemia. Less likely infectious as patient without s/s infection. CT lumbar and cervical spine without acute compression fractures. CT head negative for stroke or bleed, although MRI more sensitive for ischemic stroke.  Plan:  - hold home clonazepam  - not going to administer IVF for orthostatics and hyperNa due to possible pulmonary edema and respiratory status

## 2020-07-03 NOTE — PROGRESS NOTE ADULT - SUBJECTIVE AND OBJECTIVE BOX
CC: Patient is a 65y old  Female who presents with a chief complaint of fall (02 Jul 2020 14:03)      OVERNIGHT EVENTS:  No acute events overnight    SUBJECTIVE / INTERVAL HPI: Patient seen and examined at bedside. Pt complain of cough with associated chest pain. Denies chest pain when not coughing, sob, nausea, vomiting, diarrhea. Pt has not had a BM since admission.    ROS: negative unless otherwise stated above.    VITAL SIGNS:  Vital Signs Last 24 Hrs  T(C): 36.8 (03 Jul 2020 05:56), Max: 36.9 (02 Jul 2020 21:05)  T(F): 98.3 (03 Jul 2020 05:56), Max: 98.5 (02 Jul 2020 21:05)  HR: 60 (03 Jul 2020 05:56) (54 - 61)  BP: 103/68 (03 Jul 2020 05:56) (103/68 - 121/71)  BP(mean): --  RR: 20 (03 Jul 2020 05:56) (17 - 20)  SpO2: 95% (03 Jul 2020 05:56) (95% - 97%)    PHYSICAL EXAM:    General: WDWN, obese female  Neck: supple  Cardiovascular: +S1/S2; RRR  Respiratory: crackles of left posterior lung fields  Gastrointestinal: soft, NT/ND; +BSx4  Extremities: WWP; no edema, clubbing or cyanosis  Vascular: 2+ radial, DP/PT pulses B/L  Neurological: AAOx3; no focal deficits, leg strength 5/5 throughout    MEDICATIONS:  MEDICATIONS  (STANDING):  ARIPiprazole 10 milliGRAM(s) Oral daily  atorvastatin 20 milliGRAM(s) Oral at bedtime  cholecalciferol 1000 Unit(s) Oral daily  dextrose 5%. 1000 milliLiter(s) (50 mL/Hr) IV Continuous <Continuous>  dextrose 50% Injectable 12.5 Gram(s) IV Push once  dextrose 50% Injectable 25 Gram(s) IV Push once  dextrose 50% Injectable 25 Gram(s) IV Push once  diVALproex DR 1000 milliGRAM(s) Oral at bedtime  enoxaparin Injectable 40 milliGRAM(s) SubCutaneous every 24 hours  ferrous    sulfate 325 milliGRAM(s) Oral daily  insulin lispro (HumaLOG) corrective regimen sliding scale   SubCutaneous Before meals and at bedtime  liothyronine 25 MICROGram(s) Oral daily  nitrofurantoin macrocrystals (MACRODANTIN) 50 milliGRAM(s) Oral every 24 hours  oxybutynin 5 milliGRAM(s) Oral every 12 hours  PARoxetine 30 milliGRAM(s) Oral daily  polyethylene glycol 3350 17 Gram(s) Oral every 24 hours  QUEtiapine 400 milliGRAM(s) Oral at bedtime    MEDICATIONS  (PRN):  dextrose 40% Gel 15 Gram(s) Oral once PRN Blood Glucose LESS THAN 70 milliGRAM(s)/deciliter  glucagon  Injectable 1 milliGRAM(s) IntraMuscular once PRN Glucose LESS THAN 70 milligrams/deciliter      ALLERGIES:  Allergies    No Known Allergies    Intolerances        LABS:                        11.4   6.47  )-----------( 98       ( 03 Jul 2020 06:06 )             36.0     07-03    140  |  101  |  22  ----------------------------<  132<H>  4.1   |  26  |  0.88    Ca    9.5      03 Jul 2020 06:06  Phos  4.5     07-03  Mg     2.0     07-03    TPro  6.5  /  Alb  3.5  /  TBili  0.3  /  DBili  x   /  AST  12  /  ALT  14  /  AlkPhos  60  07-03    PT/INR - ( 02 Jul 2020 05:59 )   PT: 13.0 sec;   INR: 1.09          PTT - ( 02 Jul 2020 05:59 )  PTT:32.4 sec    CAPILLARY BLOOD GLUCOSE      POCT Blood Glucose.: 154 mg/dL (02 Jul 2020 22:07)      RADIOLOGY & ADDITIONAL TESTS: Reviewed.

## 2020-07-03 NOTE — PROGRESS NOTE ADULT - PROBLEM SELECTOR PLAN 6
- Hx bipolar d/o  - Continue home medications aripiprazole 10mg daily, valproic acid 1g nightly, paroxetine 30mg daily, and seroquel 400mg daily  - Patient is on klonopin 0.5 mg TID PRN, however will hold this medication in setting of AMS as above  - monitor for s/s benzo withdrawal

## 2020-07-03 NOTE — PROGRESS NOTE ADULT - PROBLEM SELECTOR PLAN 8
- Continue home liothyronine 25mcg daily

## 2020-07-03 NOTE — PROGRESS NOTE ADULT - PROBLEM SELECTOR PLAN 2
- As above  - Patient mental status improved  - denies alcohol use, drug screen negative  - Plan:  as above workup for fall. Also may have some symptoms in setting of hypoxia, see below

## 2020-07-05 LAB
CULTURE RESULTS: SIGNIFICANT CHANGE UP
CULTURE RESULTS: SIGNIFICANT CHANGE UP
SPECIMEN SOURCE: SIGNIFICANT CHANGE UP
SPECIMEN SOURCE: SIGNIFICANT CHANGE UP

## 2020-07-07 DIAGNOSIS — F31.9 BIPOLAR DISORDER, UNSPECIFIED: ICD-10-CM

## 2020-07-07 DIAGNOSIS — K59.00 CONSTIPATION, UNSPECIFIED: ICD-10-CM

## 2020-07-07 DIAGNOSIS — E87.2 ACIDOSIS: ICD-10-CM

## 2020-07-07 DIAGNOSIS — E78.5 HYPERLIPIDEMIA, UNSPECIFIED: ICD-10-CM

## 2020-07-07 DIAGNOSIS — R00.1 BRADYCARDIA, UNSPECIFIED: ICD-10-CM

## 2020-07-07 DIAGNOSIS — D64.9 ANEMIA, UNSPECIFIED: ICD-10-CM

## 2020-07-07 DIAGNOSIS — Z91.81 HISTORY OF FALLING: ICD-10-CM

## 2020-07-07 DIAGNOSIS — R53.1 WEAKNESS: ICD-10-CM

## 2020-07-07 DIAGNOSIS — I95.1 ORTHOSTATIC HYPOTENSION: ICD-10-CM

## 2020-07-07 DIAGNOSIS — N32.89 OTHER SPECIFIED DISORDERS OF BLADDER: ICD-10-CM

## 2020-07-07 DIAGNOSIS — E03.9 HYPOTHYROIDISM, UNSPECIFIED: ICD-10-CM

## 2020-07-07 DIAGNOSIS — D69.6 THROMBOCYTOPENIA, UNSPECIFIED: ICD-10-CM

## 2020-07-07 DIAGNOSIS — T42.4X5A ADVERSE EFFECT OF BENZODIAZEPINES, INITIAL ENCOUNTER: ICD-10-CM

## 2020-07-07 DIAGNOSIS — E11.40 TYPE 2 DIABETES MELLITUS WITH DIABETIC NEUROPATHY, UNSPECIFIED: ICD-10-CM

## 2020-07-07 DIAGNOSIS — J96.01 ACUTE RESPIRATORY FAILURE WITH HYPOXIA: ICD-10-CM

## 2020-07-07 DIAGNOSIS — G25.1 DRUG-INDUCED TREMOR: ICD-10-CM

## 2020-09-29 ENCOUNTER — INPATIENT (INPATIENT)
Facility: HOSPITAL | Age: 65
LOS: 14 days | Discharge: NURSING, REHAB OR LONG-TERM CARE | DRG: 871 | End: 2020-10-14
Payer: MEDICARE

## 2020-09-29 VITALS
SYSTOLIC BLOOD PRESSURE: 127 MMHG | TEMPERATURE: 103 F | OXYGEN SATURATION: 91 % | DIASTOLIC BLOOD PRESSURE: 63 MMHG | HEART RATE: 83 BPM | RESPIRATION RATE: 28 BRPM | WEIGHT: 249.12 LBS

## 2020-09-29 DIAGNOSIS — E11.9 TYPE 2 DIABETES MELLITUS WITHOUT COMPLICATIONS: ICD-10-CM

## 2020-09-29 DIAGNOSIS — U07.1 COVID-19: ICD-10-CM

## 2020-09-29 DIAGNOSIS — R63.8 OTHER SYMPTOMS AND SIGNS CONCERNING FOOD AND FLUID INTAKE: ICD-10-CM

## 2020-09-29 DIAGNOSIS — A41.9 SEPSIS, UNSPECIFIED ORGANISM: ICD-10-CM

## 2020-09-29 DIAGNOSIS — E03.9 HYPOTHYROIDISM, UNSPECIFIED: ICD-10-CM

## 2020-09-29 LAB
ALBUMIN SERPL ELPH-MCNC: 3.6 G/DL — SIGNIFICANT CHANGE UP (ref 3.3–5)
ALBUMIN SERPL ELPH-MCNC: 4.1 G/DL — SIGNIFICANT CHANGE UP (ref 3.3–5)
ALP SERPL-CCNC: 67 U/L — SIGNIFICANT CHANGE UP (ref 40–120)
ALP SERPL-CCNC: 72 U/L — SIGNIFICANT CHANGE UP (ref 40–120)
ALT FLD-CCNC: 9 U/L — LOW (ref 10–45)
ALT FLD-CCNC: SIGNIFICANT CHANGE UP U/L (ref 10–45)
ANION GAP SERPL CALC-SCNC: 14 MMOL/L — SIGNIFICANT CHANGE UP (ref 5–17)
ANION GAP SERPL CALC-SCNC: 15 MMOL/L — SIGNIFICANT CHANGE UP (ref 5–17)
APTT BLD: 33.5 SEC — SIGNIFICANT CHANGE UP (ref 27.5–35.5)
AST SERPL-CCNC: 19 U/L — SIGNIFICANT CHANGE UP (ref 10–40)
AST SERPL-CCNC: SIGNIFICANT CHANGE UP U/L (ref 10–40)
BASOPHILS # BLD AUTO: 0.01 K/UL — SIGNIFICANT CHANGE UP (ref 0–0.2)
BASOPHILS NFR BLD AUTO: 0.2 % — SIGNIFICANT CHANGE UP (ref 0–2)
BILIRUB SERPL-MCNC: 0.2 MG/DL — SIGNIFICANT CHANGE UP (ref 0.2–1.2)
BILIRUB SERPL-MCNC: 0.4 MG/DL — SIGNIFICANT CHANGE UP (ref 0.2–1.2)
BUN SERPL-MCNC: 21 MG/DL — SIGNIFICANT CHANGE UP (ref 7–23)
BUN SERPL-MCNC: 22 MG/DL — SIGNIFICANT CHANGE UP (ref 7–23)
CALCIUM SERPL-MCNC: 9 MG/DL — SIGNIFICANT CHANGE UP (ref 8.4–10.5)
CALCIUM SERPL-MCNC: 9.4 MG/DL — SIGNIFICANT CHANGE UP (ref 8.4–10.5)
CHLORIDE SERPL-SCNC: 100 MMOL/L — SIGNIFICANT CHANGE UP (ref 96–108)
CHLORIDE SERPL-SCNC: 99 MMOL/L — SIGNIFICANT CHANGE UP (ref 96–108)
CO2 SERPL-SCNC: 25 MMOL/L — SIGNIFICANT CHANGE UP (ref 22–31)
CO2 SERPL-SCNC: 25 MMOL/L — SIGNIFICANT CHANGE UP (ref 22–31)
CREAT SERPL-MCNC: 0.95 MG/DL — SIGNIFICANT CHANGE UP (ref 0.5–1.3)
CREAT SERPL-MCNC: 0.98 MG/DL — SIGNIFICANT CHANGE UP (ref 0.5–1.3)
CRP SERPL-MCNC: 7.57 MG/DL — HIGH (ref 0–0.4)
D DIMER BLD IA.RAPID-MCNC: 202 NG/ML DDU — SIGNIFICANT CHANGE UP
EOSINOPHIL # BLD AUTO: 0 K/UL — SIGNIFICANT CHANGE UP (ref 0–0.5)
EOSINOPHIL NFR BLD AUTO: 0 % — SIGNIFICANT CHANGE UP (ref 0–6)
FERRITIN SERPL-MCNC: 126 NG/ML — SIGNIFICANT CHANGE UP (ref 15–150)
GAS PNL BLDV: SIGNIFICANT CHANGE UP
GLUCOSE SERPL-MCNC: 104 MG/DL — HIGH (ref 70–99)
GLUCOSE SERPL-MCNC: 117 MG/DL — HIGH (ref 70–99)
HCT VFR BLD CALC: 38.7 % — SIGNIFICANT CHANGE UP (ref 34.5–45)
HGB BLD-MCNC: 11.9 G/DL — SIGNIFICANT CHANGE UP (ref 11.5–15.5)
IMM GRANULOCYTES NFR BLD AUTO: 1.1 % — SIGNIFICANT CHANGE UP (ref 0–1.5)
INR BLD: 1.19 — HIGH (ref 0.88–1.16)
LACTATE SERPL-SCNC: 0.9 MMOL/L — SIGNIFICANT CHANGE UP (ref 0.5–2)
LACTATE SERPL-SCNC: 3 MMOL/L — HIGH (ref 0.5–2)
LYMPHOCYTES # BLD AUTO: 1.27 K/UL — SIGNIFICANT CHANGE UP (ref 1–3.3)
LYMPHOCYTES # BLD AUTO: 23.8 % — SIGNIFICANT CHANGE UP (ref 13–44)
MCHC RBC-ENTMCNC: 24.7 PG — LOW (ref 27–34)
MCHC RBC-ENTMCNC: 30.7 GM/DL — LOW (ref 32–36)
MCV RBC AUTO: 80.3 FL — SIGNIFICANT CHANGE UP (ref 80–100)
MONOCYTES # BLD AUTO: 0.35 K/UL — SIGNIFICANT CHANGE UP (ref 0–0.9)
MONOCYTES NFR BLD AUTO: 6.6 % — SIGNIFICANT CHANGE UP (ref 2–14)
NEUTROPHILS # BLD AUTO: 3.64 K/UL — SIGNIFICANT CHANGE UP (ref 1.8–7.4)
NEUTROPHILS NFR BLD AUTO: 68.3 % — SIGNIFICANT CHANGE UP (ref 43–77)
NRBC # BLD: 0 /100 WBCS — SIGNIFICANT CHANGE UP (ref 0–0)
PLATELET # BLD AUTO: 100 K/UL — LOW (ref 150–400)
POTASSIUM SERPL-MCNC: 4 MMOL/L — SIGNIFICANT CHANGE UP (ref 3.5–5.3)
POTASSIUM SERPL-MCNC: SIGNIFICANT CHANGE UP MMOL/L (ref 3.5–5.3)
POTASSIUM SERPL-SCNC: 4 MMOL/L — SIGNIFICANT CHANGE UP (ref 3.5–5.3)
POTASSIUM SERPL-SCNC: SIGNIFICANT CHANGE UP MMOL/L (ref 3.5–5.3)
PROCALCITONIN SERPL-MCNC: 0.32 NG/ML — HIGH (ref 0.02–0.1)
PROT SERPL-MCNC: 7.2 G/DL — SIGNIFICANT CHANGE UP (ref 6–8.3)
PROT SERPL-MCNC: 8 G/DL — SIGNIFICANT CHANGE UP (ref 6–8.3)
PROTHROM AB SERPL-ACNC: 14.2 SEC — HIGH (ref 10.6–13.6)
RBC # BLD: 4.82 M/UL — SIGNIFICANT CHANGE UP (ref 3.8–5.2)
RBC # FLD: 15.6 % — HIGH (ref 10.3–14.5)
SARS-COV-2 RNA SPEC QL NAA+PROBE: DETECTED
SODIUM SERPL-SCNC: 139 MMOL/L — SIGNIFICANT CHANGE UP (ref 135–145)
SODIUM SERPL-SCNC: 139 MMOL/L — SIGNIFICANT CHANGE UP (ref 135–145)
WBC # BLD: 5.33 K/UL — SIGNIFICANT CHANGE UP (ref 3.8–10.5)
WBC # FLD AUTO: 5.33 K/UL — SIGNIFICANT CHANGE UP (ref 3.8–10.5)

## 2020-09-29 PROCEDURE — 99223 1ST HOSP IP/OBS HIGH 75: CPT | Mod: CS,GC

## 2020-09-29 PROCEDURE — 99285 EMERGENCY DEPT VISIT HI MDM: CPT | Mod: CS,25

## 2020-09-29 PROCEDURE — 71045 X-RAY EXAM CHEST 1 VIEW: CPT | Mod: 26,77

## 2020-09-29 PROCEDURE — 71045 X-RAY EXAM CHEST 1 VIEW: CPT | Mod: 26

## 2020-09-29 PROCEDURE — 93010 ELECTROCARDIOGRAM REPORT: CPT

## 2020-09-29 RX ORDER — VANCOMYCIN HCL 1 G
1750 VIAL (EA) INTRAVENOUS ONCE
Refills: 0 | Status: COMPLETED | OUTPATIENT
Start: 2020-09-29 | End: 2020-09-29

## 2020-09-29 RX ORDER — FERROUS SULFATE 325(65) MG
1 TABLET ORAL
Qty: 0 | Refills: 0 | DISCHARGE

## 2020-09-29 RX ORDER — DEXTROSE 50 % IN WATER 50 %
25 SYRINGE (ML) INTRAVENOUS ONCE
Refills: 0 | Status: DISCONTINUED | OUTPATIENT
Start: 2020-09-29 | End: 2020-10-14

## 2020-09-29 RX ORDER — ONDANSETRON 8 MG/1
4 TABLET, FILM COATED ORAL ONCE
Refills: 0 | Status: COMPLETED | OUTPATIENT
Start: 2020-09-29 | End: 2020-09-29

## 2020-09-29 RX ORDER — ATORVASTATIN CALCIUM 80 MG/1
1 TABLET, FILM COATED ORAL
Qty: 0 | Refills: 0 | DISCHARGE

## 2020-09-29 RX ORDER — PIPERACILLIN AND TAZOBACTAM 4; .5 G/20ML; G/20ML
3.38 INJECTION, POWDER, LYOPHILIZED, FOR SOLUTION INTRAVENOUS ONCE
Refills: 0 | Status: COMPLETED | OUTPATIENT
Start: 2020-09-29 | End: 2020-09-29

## 2020-09-29 RX ORDER — REMDESIVIR 5 MG/ML
100 INJECTION INTRAVENOUS EVERY 24 HOURS
Refills: 0 | Status: DISCONTINUED | OUTPATIENT
Start: 2020-09-29 | End: 2020-09-29

## 2020-09-29 RX ORDER — NITROFURANTOIN MACROCRYSTAL 50 MG
1 CAPSULE ORAL
Qty: 0 | Refills: 0 | DISCHARGE

## 2020-09-29 RX ORDER — ACETAMINOPHEN 500 MG
1000 TABLET ORAL ONCE
Refills: 0 | Status: COMPLETED | OUTPATIENT
Start: 2020-09-29 | End: 2020-09-29

## 2020-09-29 RX ORDER — DEXTROSE 50 % IN WATER 50 %
15 SYRINGE (ML) INTRAVENOUS ONCE
Refills: 0 | Status: DISCONTINUED | OUTPATIENT
Start: 2020-09-29 | End: 2020-10-14

## 2020-09-29 RX ORDER — ARIPIPRAZOLE 15 MG/1
1 TABLET ORAL
Qty: 0 | Refills: 0 | DISCHARGE

## 2020-09-29 RX ORDER — SODIUM CHLORIDE 9 MG/ML
500 INJECTION INTRAMUSCULAR; INTRAVENOUS; SUBCUTANEOUS ONCE
Refills: 0 | Status: COMPLETED | OUTPATIENT
Start: 2020-09-29 | End: 2020-09-29

## 2020-09-29 RX ORDER — REMDESIVIR 5 MG/ML
INJECTION INTRAVENOUS
Refills: 0 | Status: COMPLETED | OUTPATIENT
Start: 2020-09-29 | End: 2020-10-03

## 2020-09-29 RX ORDER — SODIUM CHLORIDE 9 MG/ML
1000 INJECTION, SOLUTION INTRAVENOUS
Refills: 0 | Status: DISCONTINUED | OUTPATIENT
Start: 2020-09-29 | End: 2020-10-14

## 2020-09-29 RX ORDER — SITAGLIPTIN 50 MG/1
1 TABLET, FILM COATED ORAL
Qty: 0 | Refills: 0 | DISCHARGE

## 2020-09-29 RX ORDER — LIOTHYRONINE SODIUM 25 UG/1
1 TABLET ORAL
Qty: 0 | Refills: 0 | DISCHARGE

## 2020-09-29 RX ORDER — METFORMIN HYDROCHLORIDE 850 MG/1
1 TABLET ORAL
Qty: 0 | Refills: 0 | DISCHARGE

## 2020-09-29 RX ORDER — ICOSAPENT ETHYL 500 MG/1
2 CAPSULE, LIQUID FILLED ORAL
Qty: 0 | Refills: 0 | DISCHARGE

## 2020-09-29 RX ORDER — CHOLECALCIFEROL (VITAMIN D3) 125 MCG
1 CAPSULE ORAL
Qty: 0 | Refills: 0 | DISCHARGE

## 2020-09-29 RX ORDER — QUETIAPINE FUMARATE 200 MG/1
2 TABLET, FILM COATED ORAL
Qty: 0 | Refills: 0 | DISCHARGE

## 2020-09-29 RX ORDER — DEXAMETHASONE 0.5 MG/5ML
6 ELIXIR ORAL EVERY 24 HOURS
Refills: 0 | Status: DISCONTINUED | OUTPATIENT
Start: 2020-09-29 | End: 2020-10-05

## 2020-09-29 RX ORDER — REMDESIVIR 5 MG/ML
200 INJECTION INTRAVENOUS EVERY 24 HOURS
Refills: 0 | Status: COMPLETED | OUTPATIENT
Start: 2020-09-29 | End: 2020-09-29

## 2020-09-29 RX ORDER — OXYBUTYNIN CHLORIDE 5 MG
1 TABLET ORAL
Qty: 0 | Refills: 0 | DISCHARGE

## 2020-09-29 RX ORDER — DIVALPROEX SODIUM 500 MG/1
2 TABLET, DELAYED RELEASE ORAL
Qty: 0 | Refills: 0 | DISCHARGE

## 2020-09-29 RX ORDER — INSULIN LISPRO 100/ML
VIAL (ML) SUBCUTANEOUS
Refills: 0 | Status: DISCONTINUED | OUTPATIENT
Start: 2020-09-29 | End: 2020-10-14

## 2020-09-29 RX ORDER — GLUCAGON INJECTION, SOLUTION 0.5 MG/.1ML
1 INJECTION, SOLUTION SUBCUTANEOUS ONCE
Refills: 0 | Status: DISCONTINUED | OUTPATIENT
Start: 2020-09-29 | End: 2020-10-14

## 2020-09-29 RX ORDER — DEXTROSE 50 % IN WATER 50 %
12.5 SYRINGE (ML) INTRAVENOUS ONCE
Refills: 0 | Status: DISCONTINUED | OUTPATIENT
Start: 2020-09-29 | End: 2020-10-14

## 2020-09-29 RX ORDER — REMDESIVIR 5 MG/ML
100 INJECTION INTRAVENOUS EVERY 24 HOURS
Refills: 0 | Status: COMPLETED | OUTPATIENT
Start: 2020-09-30 | End: 2020-10-03

## 2020-09-29 RX ADMIN — Medication 400 MILLIGRAM(S): at 17:30

## 2020-09-29 RX ADMIN — SODIUM CHLORIDE 1000 MILLILITER(S): 9 INJECTION INTRAMUSCULAR; INTRAVENOUS; SUBCUTANEOUS at 17:59

## 2020-09-29 RX ADMIN — ONDANSETRON 4 MILLIGRAM(S): 8 TABLET, FILM COATED ORAL at 17:35

## 2020-09-29 RX ADMIN — PIPERACILLIN AND TAZOBACTAM 200 GRAM(S): 4; .5 INJECTION, POWDER, LYOPHILIZED, FOR SOLUTION INTRAVENOUS at 20:25

## 2020-09-29 RX ADMIN — REMDESIVIR 500 MILLIGRAM(S): 5 INJECTION INTRAVENOUS at 22:14

## 2020-09-29 NOTE — H&P ADULT - ASSESSMENT
65F w PMHx BPD, PD, HLD, T2DM, anemia, multiple UTIs on chronic nitrofurantoin, hypothyroidism presenting from nursing home with cough and fever, admitted for management of sepsis 2/2 PNA.   65F w PMHx BPD, PD, HLD, T2DM, anemia, multiple UTIs on chronic nitrofurantoin, hypothyroidism presenting from nursing home with cough and fever, admitted to [facility] for management of sepsis 2/2 COVID19 PNA vs bacterial PNA.

## 2020-09-29 NOTE — H&P ADULT - NSHPLABSRESULTS_GEN_ALL_CORE
LABS:                        11.9   5.33  )-----------( 100      ( 29 Sep 2020 17:31 )             38.7     09-29    139  |  99  |  21  ----------------------------<  104<H>  see note   |  25  |  0.95    Ca    9.4      29 Sep 2020 17:31    TPro  8.0  /  Alb  4.1  /  TBili  0.4  /  DBili  x   /  AST  see note  /  ALT  see note  /  AlkPhos  72  09-29    PT/INR - ( 29 Sep 2020 17:31 )   PT: 14.2 sec;   INR: 1.19          PTT - ( 29 Sep 2020 17:31 )  PTT:33.5 sec  Lactate, Blood: 3.0 mmol/L (09-29-20 @ 17:29)          Blood Gas Profile w/Lytes - Venous: Performed in Lab (09-29-20 @ 17:31)      EKG: Reviewed.    RADIOLOGY & ADDITIONAL TESTS: Reviewed.

## 2020-09-29 NOTE — ED PROVIDER NOTE - OBJECTIVE STATEMENT
64 yo bipolar disorder, ?Parkinson's, HLD, T2DM, anemia, prior UTIs on chronic nitrofurantoin, and hypothyroidism presenting to ED for concern for RUL pna, cough, fever. Denies cp, Denies associated SOB, NVD, lightheaded, diaphoresis, palpitations,   LE pain/swelling,  abd pain, urinary complaints. Tested covid positive at facility.

## 2020-09-29 NOTE — H&P ADULT - NSHPPHYSICALEXAM_GEN_ALL_CORE
VITAL SIGNS:  ICU Vital Signs Last 24 Hrs  T(C): 38.6 (29 Sep 2020 18:38), Max: 40.2 (29 Sep 2020 17:10)  T(F): 101.4 (29 Sep 2020 18:38), Max: 104.4 (29 Sep 2020 17:10)  HR: 93 (29 Sep 2020 18:38) (83 - 93)  BP: 114/57 (29 Sep 2020 18:38) (114/57 - 127/88)  BP(mean): --  ABP: --  ABP(mean): --  RR: 24 (29 Sep 2020 18:38) (24 - 28)  SpO2: 93% (29 Sep 2020 18:38) (91% - 95%)    CAPILLARY BLOOD GLUCOSE    PHYSICAL EXAM:  Constitutional: resting comfortably in bed, NAD  HEENT: NC/AT; PERRL, anicteric sclera; no oropharyngeal erythema or exudates; MMM  Neck: supple, no appreciable JVD  Respiratory: CTA B/L, no W/R/R; respirations appear non-labored, conversive in full sentences  Cardiovascular: +S1/S2, RRR  Gastrointestinal: abdomen soft, NT/ND  Extremities: WWP; no clubbing, cyanosis or edema  Vascular: 2+ radial, femoral, and DP/PT pulses B/L  Dermatologic: skin normal color and turgor; no visible rashes  Neurological: VITAL SIGNS:  ICU Vital Signs Last 24 Hrs  T(C): 38.6 (29 Sep 2020 18:38), Max: 40.2 (29 Sep 2020 17:10)  T(F): 101.4 (29 Sep 2020 18:38), Max: 104.4 (29 Sep 2020 17:10)  HR: 93 (29 Sep 2020 18:38) (83 - 93)  BP: 114/57 (29 Sep 2020 18:38) (114/57 - 127/88)  BP(mean): --  ABP: --  ABP(mean): --  RR: 24 (29 Sep 2020 18:38) (24 - 28)  SpO2: 93% (29 Sep 2020 18:38) (91% - 95%)    CAPILLARY BLOOD GLUCOSE    PHYSICAL EXAM:  Constitutional: resting comfortably in bed, NAD  HEENT: NC/AT; PERRL, anicteric sclera; no oropharyngeal erythema or exudates; MMM  Neck: supple, no appreciable JVD  Respiratory: bibasilar crackles appreciated R>L, conversive in full sentences on 2L NC, no w/r/r  Cardiovascular: +S1/S2, RRR  Gastrointestinal: obese abdomen soft, NT/ND  Extremities: WWP; no clubbing, cyanosis or edema  Vascular: 2+ radial, femoral, and DP/PT pulses B/L  Dermatologic: skin normal color and turgor; no visible rashes  Neurological: AAOx3; no focal neurological deficits

## 2020-09-29 NOTE — ED PROVIDER NOTE - CLINICAL SUMMARY MEDICAL DECISION MAKING FREE TEXT BOX
Pt w fever, cough, ?RUL infiltrate, given elevated lactate, fever, will tx w abx. no tachypnea  Sepsis suspected at this time.   IVF bolus cannot be given due to: Covid  Disc with Dr. Bello

## 2020-09-29 NOTE — ED PROVIDER NOTE - DIAGNOSTIC INTERPRETATION
ER Physician: Freya Lainez MD  CHEST XRAY INTERPRETATION: diffuse intersitial markings, heart shadow normal, bony structures intact

## 2020-09-29 NOTE — H&P ADULT - PROBLEM SELECTOR PLAN 5
F: none  E: replete PRN  N: DASH/TLC kosher diet  GI ppx:  DVT ppx:    CODE: full  Dispo: 3[facility] -Aripiprazole 10mg q24h  -Valproate 500mg 2tabs qhs  -quetiapine 200mg 2tabs qhs  -paroxetine 30mg q24h

## 2020-09-29 NOTE — H&P ADULT - ATTENDING COMMENTS
And was admitted with acute hypoxic respiratory failure secondary COVID infection.  The patient is hypoxic in room air with sat 88% on room air.  Chest x-ray showed patchy bilateral infiltrates.  The C-reactive protein is elevated.  The patient was started on antiviral, Decadron, and oxygen supplementation.  Monitor liver function and renal function which are normal  Patient seen and examined with house-staff during bedside rounds.  Resident note read, including vitals, physical findings, laboratory data, and radiological reports.   Revisions included below.  Direct personal management at bed side and extensive interpretation of the data.  Plan was outlined and discussed in details with the housestaff.  Decision making of high complexity  Action taken for acute disease activity to reflect the level of care provided:  - medication reconciliation  - review laboratory data

## 2020-09-29 NOTE — H&P ADULT - PROBLEM SELECTOR PLAN 1
Pt presenting with severe sepsis: lactate 3 (resolved to 0.9 after 500cc bolus), T 101.4, HR 93, RR 24. CXR showing small consolidation in RUL. Pt COVID+ x2, however procalcitonin 0.32. PNA likely 2/2 COVID vs. bacterial PNA. Received Tylenol 1g, 500cc NS, Vancomycin 1750mg, Zosyn 3.375g.   -currently admitted to 3[facility] and being treated for COVID  -lactate resolved from 3 to 0.9  -will not continue Vancomycin and Zosyn at this time, consider restarting as needed  -f/u blood cultures Pt presenting with severe sepsis: lactate 3 (resolved to 0.9 after 500cc bolus), T 101.4, HR 93, RR 24. CXR showing b/l patchy infiltrates. Pt COVID+ x2 with CRP 7.57, however procalcitonin 0.32. PNA likely 2/2 COVID vs. bacterial PNA. Received Tylenol 1g, 500cc NS, Vancomycin 1750mg, Zosyn 3.375g.   -currently admitted to 3[facility] and being treated for COVID  -lactate resolved from 3 to 0.9  -will not continue Vancomycin and Zosyn at this time, consider restarting as needed  -f/u blood cultures

## 2020-09-29 NOTE — ED ADULT NURSE NOTE - INTERVENTIONS DEFINITIONS
[place] to call system/Instruct patient to call for assistance/Physically safe environment: no spills, clutter or unnecessary equipment/Provide visual cue, wrist band, yellow gown, etc.

## 2020-09-29 NOTE — H&P ADULT - PROBLEM SELECTOR PLAN 2
Pt COVID postive x2,  presenting with fever to 101.4F  -admit to COVID unit  -stable on 2L NC  -isolation precautions  -start remdesivir for 5 days (9/29 - )  -start decadron 6mg daily   -f/u daily COVID labs

## 2020-09-29 NOTE — H&P ADULT - HISTORY OF PRESENT ILLNESS
65F w PMHx BPD, PD, HLD, T2DM, anemia, multiple UTIs on chronic nitrofurantoin, hypothyroidism presenting from nursing home with cough and reported fever of 104F, with CXR at nursing home suspicious for RUL infiltrate      ED Vitals: T 101.4, HR 93, /57, RR 24 SpO2 93% on 2LNC  Labs significant for Lactate 3, Procalcitonin 0.32 65F w PMHx BPD, PD, HLD, T2DM, anemia, multiple UTIs on chronic nitrofurantoin, hypothyroidism presenting from nursing home with cough and reported fever of 104F, with CXR at nursing home suspicious for RUL infiltrate      ED Vitals: T 101.4, HR 93, /57, RR 24 SpO2 93% on 2LNC  Labs significant for Lactate 3, Procalcitonin 0.32  CXR:  Received 1g tylenol, 500cc NS bolus,  1750mg Vancomycin, 3.375g Zosyn 65F w PMHx BPD, PD, HLD, T2DM, anemia, multiple UTIs on chronic nitrofurantoin, hypothyroidism presenting from nursing home with cough and reported fever of 104F, with CXR at nursing home suspicious for RUL infiltrate. Of note, pt also tested positive for COVID19. Pt endorses intermittent nonproductive cough, denies difficulty breathing, chest tightness, CP, abdominal pain, N/V/D/C, dysuria.    ED Vitals: T 101.4, HR 93, /57, RR 24 SpO2 93% on 2LNC  Labs significant for Lactate 3 (resolved to 0.9), Procalcitonin 0.32, CRP mildly elevated 7.57, no leukocytosis  CXR: small consolidation in RUL  EKG: normal sinus  Received 1g tylenol, 500cc NS bolus,  1750mg Vancomycin, 3.375g Zosyn    Pt admitted to [facility] for sepsis 2/2 PNA secondary to COVID-19 vs bacterial PNA 65F w PMHx BPD, ?PD, HLD, T2DM, anemia, multiple UTIs on chronic nitrofurantoin, hypothyroidism presenting from nursing home with cough and reported fever of 104F, with CXR at nursing home suspicious for RUL infiltrate. Of note, pt also tested positive for COVID19. Pt endorses intermittent nonproductive cough, denies difficulty breathing, chest tightness, CP, abdominal pain, N/V/D/C, dysuria.    ED Vitals: T 101.4, HR 93, /57, RR 24 SpO2 93% on 2LNC  Labs significant for Lactate 3 (resolved to 0.9), Procalcitonin 0.32, CRP mildly elevated 7.57, no leukocytosis  CXR: patchy b/l infiltrates  EKG: normal sinus  Received 1g tylenol, 500cc NS bolus,  1750mg Vancomycin, 3.375g Zosyn    Pt admitted to [facility] for sepsis 2/2 PNA secondary to COVID-19 vs bacterial PNA

## 2020-09-29 NOTE — H&P ADULT - PROBLEM SELECTOR PLAN 6
F: none  E: replete PRN  N: consistent carb,  kosher diet  GI ppx:  DVT ppx:    CODE: full  Dispo: 3[facility]

## 2020-09-29 NOTE — ED ADULT NURSE NOTE - OBJECTIVE STATEMENT
patient MILENA from nursing home. tested positive for covid-19 and RUL pneumonia. pt is a.o.x3. c.o nausea. vomitted twice in ED. denies any chest pain, sob, cough.

## 2020-09-29 NOTE — ED PROVIDER NOTE - NS ED MD DISPO SPECIAL CONSIDERATION1
POD #4. A&Ox4. VSS on RA. Up with SBA with walker. Abdominal pain managed with PRN oxycodone and scheduled PO tylenol. + gas, + bowel sounds, no BM yet. Voiding spontaneously with adequate UOP. Regular diet. Blood sugar monitoring. PIV - SL. Midline surgical incision with steri strips; CDI/ELLIOTT. PLAN:  Discharge to TCU once placement available. Waiting for return of bowel function.      Confirmed COVID-19

## 2020-09-30 DIAGNOSIS — E78.5 HYPERLIPIDEMIA, UNSPECIFIED: ICD-10-CM

## 2020-09-30 DIAGNOSIS — F31.9 BIPOLAR DISORDER, UNSPECIFIED: ICD-10-CM

## 2020-09-30 DIAGNOSIS — R33.9 RETENTION OF URINE, UNSPECIFIED: ICD-10-CM

## 2020-09-30 DIAGNOSIS — D50.9 IRON DEFICIENCY ANEMIA, UNSPECIFIED: ICD-10-CM

## 2020-09-30 PROBLEM — E11.9 TYPE 2 DIABETES MELLITUS WITHOUT COMPLICATIONS: Chronic | Status: ACTIVE | Noted: 2020-06-30

## 2020-09-30 PROBLEM — E03.9 HYPOTHYROIDISM, UNSPECIFIED: Chronic | Status: ACTIVE | Noted: 2020-06-30

## 2020-09-30 LAB
A1C WITH ESTIMATED AVERAGE GLUCOSE RESULT: 6.9 % — HIGH (ref 4–5.6)
ALBUMIN SERPL ELPH-MCNC: 3.5 G/DL — SIGNIFICANT CHANGE UP (ref 3.3–5)
ALP SERPL-CCNC: 66 U/L — SIGNIFICANT CHANGE UP (ref 40–120)
ALT FLD-CCNC: 9 U/L — LOW (ref 10–45)
ANION GAP SERPL CALC-SCNC: 13 MMOL/L — SIGNIFICANT CHANGE UP (ref 5–17)
AST SERPL-CCNC: 16 U/L — SIGNIFICANT CHANGE UP (ref 10–40)
BASOPHILS # BLD AUTO: 0 K/UL — SIGNIFICANT CHANGE UP (ref 0–0.2)
BASOPHILS NFR BLD AUTO: 0 % — SIGNIFICANT CHANGE UP (ref 0–2)
BILIRUB SERPL-MCNC: 0.2 MG/DL — SIGNIFICANT CHANGE UP (ref 0.2–1.2)
BUN SERPL-MCNC: 20 MG/DL — SIGNIFICANT CHANGE UP (ref 7–23)
CALCIUM SERPL-MCNC: 8.6 MG/DL — SIGNIFICANT CHANGE UP (ref 8.4–10.5)
CHLORIDE SERPL-SCNC: 100 MMOL/L — SIGNIFICANT CHANGE UP (ref 96–108)
CO2 SERPL-SCNC: 24 MMOL/L — SIGNIFICANT CHANGE UP (ref 22–31)
CREAT SERPL-MCNC: 0.92 MG/DL — SIGNIFICANT CHANGE UP (ref 0.5–1.3)
CRP SERPL-MCNC: 9.64 MG/DL — HIGH (ref 0–0.4)
D DIMER BLD IA.RAPID-MCNC: 206 NG/ML DDU — SIGNIFICANT CHANGE UP
EOSINOPHIL # BLD AUTO: 0 K/UL — SIGNIFICANT CHANGE UP (ref 0–0.5)
EOSINOPHIL NFR BLD AUTO: 0 % — SIGNIFICANT CHANGE UP (ref 0–6)
ESTIMATED AVERAGE GLUCOSE: 151 MG/DL — HIGH (ref 68–114)
FERRITIN SERPL-MCNC: 131 NG/ML — SIGNIFICANT CHANGE UP (ref 15–150)
GLUCOSE BLDC GLUCOMTR-MCNC: 101 MG/DL — HIGH (ref 70–99)
GLUCOSE BLDC GLUCOMTR-MCNC: 137 MG/DL — HIGH (ref 70–99)
GLUCOSE BLDC GLUCOMTR-MCNC: 157 MG/DL — HIGH (ref 70–99)
GLUCOSE BLDC GLUCOMTR-MCNC: 169 MG/DL — HIGH (ref 70–99)
GLUCOSE BLDC GLUCOMTR-MCNC: 185 MG/DL — HIGH (ref 70–99)
GLUCOSE SERPL-MCNC: 182 MG/DL — HIGH (ref 70–99)
HCT VFR BLD CALC: 34.3 % — LOW (ref 34.5–45)
HGB BLD-MCNC: 10.7 G/DL — LOW (ref 11.5–15.5)
LYMPHOCYTES # BLD AUTO: 0.61 K/UL — LOW (ref 1–3.3)
LYMPHOCYTES # BLD AUTO: 16.8 % — SIGNIFICANT CHANGE UP (ref 13–44)
MAGNESIUM SERPL-MCNC: 2.4 MG/DL — SIGNIFICANT CHANGE UP (ref 1.6–2.6)
MCHC RBC-ENTMCNC: 24.9 PG — LOW (ref 27–34)
MCHC RBC-ENTMCNC: 31.2 GM/DL — LOW (ref 32–36)
MCV RBC AUTO: 80 FL — SIGNIFICANT CHANGE UP (ref 80–100)
MONOCYTES # BLD AUTO: 0.13 K/UL — SIGNIFICANT CHANGE UP (ref 0–0.9)
MONOCYTES NFR BLD AUTO: 3.5 % — SIGNIFICANT CHANGE UP (ref 2–14)
NEUTROPHILS # BLD AUTO: 2.91 K/UL — SIGNIFICANT CHANGE UP (ref 1.8–7.4)
NEUTROPHILS NFR BLD AUTO: 73.5 % — SIGNIFICANT CHANGE UP (ref 43–77)
PLATELET # BLD AUTO: 93 K/UL — LOW (ref 150–400)
POTASSIUM SERPL-MCNC: 4.2 MMOL/L — SIGNIFICANT CHANGE UP (ref 3.5–5.3)
POTASSIUM SERPL-SCNC: 4.2 MMOL/L — SIGNIFICANT CHANGE UP (ref 3.5–5.3)
PROT SERPL-MCNC: 7.2 G/DL — SIGNIFICANT CHANGE UP (ref 6–8.3)
RBC # BLD: 4.29 M/UL — SIGNIFICANT CHANGE UP (ref 3.8–5.2)
RBC # FLD: 15.6 % — HIGH (ref 10.3–14.5)
SODIUM SERPL-SCNC: 137 MMOL/L — SIGNIFICANT CHANGE UP (ref 135–145)
TSH SERPL-MCNC: 0.14 UIU/ML — LOW (ref 0.35–4.94)
WBC # BLD: 3.65 K/UL — LOW (ref 3.8–10.5)
WBC # FLD AUTO: 3.65 K/UL — LOW (ref 3.8–10.5)

## 2020-09-30 PROCEDURE — 99233 SBSQ HOSP IP/OBS HIGH 50: CPT | Mod: CS,GC

## 2020-09-30 RX ORDER — QUETIAPINE FUMARATE 200 MG/1
400 TABLET, FILM COATED ORAL AT BEDTIME
Refills: 0 | Status: DISCONTINUED | OUTPATIENT
Start: 2020-09-30 | End: 2020-10-14

## 2020-09-30 RX ORDER — CHOLECALCIFEROL (VITAMIN D3) 125 MCG
1000 CAPSULE ORAL DAILY
Refills: 0 | Status: DISCONTINUED | OUTPATIENT
Start: 2020-09-30 | End: 2020-10-14

## 2020-09-30 RX ORDER — DIVALPROEX SODIUM 500 MG/1
1000 TABLET, DELAYED RELEASE ORAL AT BEDTIME
Refills: 0 | Status: DISCONTINUED | OUTPATIENT
Start: 2020-09-30 | End: 2020-09-30

## 2020-09-30 RX ORDER — DIVALPROEX SODIUM 500 MG/1
1000 TABLET, DELAYED RELEASE ORAL AT BEDTIME
Refills: 0 | Status: DISCONTINUED | OUTPATIENT
Start: 2020-09-30 | End: 2020-10-14

## 2020-09-30 RX ORDER — ENOXAPARIN SODIUM 100 MG/ML
40 INJECTION SUBCUTANEOUS EVERY 12 HOURS
Refills: 0 | Status: DISCONTINUED | OUTPATIENT
Start: 2020-09-30 | End: 2020-10-14

## 2020-09-30 RX ORDER — FERROUS SULFATE 325(65) MG
325 TABLET ORAL DAILY
Refills: 0 | Status: DISCONTINUED | OUTPATIENT
Start: 2020-09-30 | End: 2020-10-14

## 2020-09-30 RX ORDER — INFLUENZA VIRUS VACCINE 15; 15; 15; 15 UG/.5ML; UG/.5ML; UG/.5ML; UG/.5ML
0.7 SUSPENSION INTRAMUSCULAR ONCE
Refills: 0 | Status: DISCONTINUED | OUTPATIENT
Start: 2020-09-30 | End: 2020-10-14

## 2020-09-30 RX ORDER — LIOTHYRONINE SODIUM 25 UG/1
25 TABLET ORAL DAILY
Refills: 0 | Status: DISCONTINUED | OUTPATIENT
Start: 2020-09-30 | End: 2020-10-14

## 2020-09-30 RX ORDER — OXYBUTYNIN CHLORIDE 5 MG
5 TABLET ORAL
Refills: 0 | Status: DISCONTINUED | OUTPATIENT
Start: 2020-10-01 | End: 2020-10-02

## 2020-09-30 RX ORDER — OXYBUTYNIN CHLORIDE 5 MG
10 TABLET ORAL EVERY 24 HOURS
Refills: 0 | Status: DISCONTINUED | OUTPATIENT
Start: 2020-09-30 | End: 2020-09-30

## 2020-09-30 RX ORDER — INFLUENZA VIRUS VACCINE 15; 15; 15; 15 UG/.5ML; UG/.5ML; UG/.5ML; UG/.5ML
0.5 SUSPENSION INTRAMUSCULAR ONCE
Refills: 0 | Status: DISCONTINUED | OUTPATIENT
Start: 2020-09-30 | End: 2020-09-30

## 2020-09-30 RX ORDER — ATORVASTATIN CALCIUM 80 MG/1
20 TABLET, FILM COATED ORAL AT BEDTIME
Refills: 0 | Status: DISCONTINUED | OUTPATIENT
Start: 2020-09-30 | End: 2020-10-14

## 2020-09-30 RX ORDER — POLYETHYLENE GLYCOL 3350 17 G/17G
17 POWDER, FOR SOLUTION ORAL EVERY 24 HOURS
Refills: 0 | Status: DISCONTINUED | OUTPATIENT
Start: 2020-09-30 | End: 2020-10-14

## 2020-09-30 RX ORDER — ARIPIPRAZOLE 15 MG/1
10 TABLET ORAL DAILY
Refills: 0 | Status: DISCONTINUED | OUTPATIENT
Start: 2020-09-30 | End: 2020-10-14

## 2020-09-30 RX ADMIN — Medication 2: at 12:17

## 2020-09-30 RX ADMIN — Medication 325 MILLIGRAM(S): at 11:25

## 2020-09-30 RX ADMIN — DIVALPROEX SODIUM 1000 MILLIGRAM(S): 500 TABLET, DELAYED RELEASE ORAL at 23:16

## 2020-09-30 RX ADMIN — Medication 6 MILLIGRAM(S): at 22:55

## 2020-09-30 RX ADMIN — Medication 2: at 17:06

## 2020-09-30 RX ADMIN — REMDESIVIR 500 MILLIGRAM(S): 5 INJECTION INTRAVENOUS at 21:44

## 2020-09-30 RX ADMIN — ARIPIPRAZOLE 10 MILLIGRAM(S): 15 TABLET ORAL at 11:24

## 2020-09-30 RX ADMIN — ATORVASTATIN CALCIUM 20 MILLIGRAM(S): 80 TABLET, FILM COATED ORAL at 21:45

## 2020-09-30 RX ADMIN — LIOTHYRONINE SODIUM 25 MICROGRAM(S): 25 TABLET ORAL at 06:43

## 2020-09-30 RX ADMIN — Medication 30 MILLIGRAM(S): at 11:26

## 2020-09-30 RX ADMIN — ENOXAPARIN SODIUM 40 MILLIGRAM(S): 100 INJECTION SUBCUTANEOUS at 17:06

## 2020-09-30 RX ADMIN — Medication 2: at 06:43

## 2020-09-30 RX ADMIN — QUETIAPINE FUMARATE 400 MILLIGRAM(S): 200 TABLET, FILM COATED ORAL at 21:43

## 2020-09-30 RX ADMIN — Medication 250 MILLIGRAM(S): at 00:03

## 2020-09-30 RX ADMIN — Medication 1000 UNIT(S): at 11:24

## 2020-09-30 RX ADMIN — POLYETHYLENE GLYCOL 3350 17 GRAM(S): 17 POWDER, FOR SOLUTION ORAL at 06:47

## 2020-09-30 RX ADMIN — Medication 6 MILLIGRAM(S): at 00:03

## 2020-09-30 RX ADMIN — Medication 10 MILLIGRAM(S): at 11:28

## 2020-09-30 NOTE — PROGRESS NOTE ADULT - PROBLEM SELECTOR PLAN 5
-Aripiprazole 10mg q24h  -Valproate 500mg 2tabs qhs  -quetiapine 200mg 2tabs qhs  -paroxetine 30mg q24h -Aripiprazole 10mg q24h  -depakote 1000 mg PO qhs  -quetiapine 400 mg PO qhs  -paroxetine 30mg PO q24h  -monitor for QTc prolongation, extrapyramidal side effects. QTc 416 on admission EKG

## 2020-09-30 NOTE — PROGRESS NOTE ADULT - ASSESSMENT
65F w PMHx BPD, PD, HLD, T2DM, anemia, multiple UTIs on chronic nitrofurantoin, hypothyroidism presenting from nursing home with cough and fever, admitted to [facility] for management of sepsis 2/2 COVID19 PNA vs bacterial PNA.    65F w PMHx bipolar disorder, PD, HLD, T2DM, anemia, multiple UTIs on chronic nitrofurantoin, hypothyroidism presenting from nursing home with cough and fever, admitted to [facility] for management of sepsis 2/2 COVID19 PNA vs bacterial PNA.

## 2020-09-30 NOTE — CHART NOTE - TREATMENT: THE FOLLOWING DIET HAS BEEN RECOMMENDED
Diet, Consistent Carbohydrate/No Snacks:   Kosher (09-29-20 @ 22:36) [Active]    1) Continue with current Consistent Carbohydrate w/o snacks, Kosher diet  2) POCT q6hrs, maintain tight BG control  3) Monitor lytes and replete prn   4) Continue with current micronutrient supplementation, at team discretion  5) Monitor weights, labs, skin integrity, GI distress, nutrient consumption  6) Pain and bowel regimen per team

## 2020-09-30 NOTE — PROGRESS NOTE ADULT - PROBLEM SELECTOR PLAN 1
Pt presenting with severe sepsis: lactate 3 (resolved to 0.9 after 500cc bolus), T 101.4, HR 93, RR 24. CXR showing b/l patchy infiltrates. Pt COVID+ x2 with CRP 7.57, however procalcitonin 0.32. PNA likely 2/2 COVID vs. bacterial PNA. Received Tylenol 1g, 500cc NS, Vancomycin 1750mg, Zosyn 3.375g.   -currently admitted to 3[facility] and being treated for COVID  -lactate resolved from 3 to 0.9  -will not continue Vancomycin and Zosyn at this time, consider restarting as needed  -f/u blood cultures Pt presented to ED with severe sepsis: lactate 3 (resolved to 0.9 after 500cc bolus), T 101.4, HR 93, RR 24. CXR showing b/l patchy infiltrates. Pt COVID+ x2 with CRP 7.57, however procalcitonin 0.32. PNA likely 2/2 COVID vs. bacterial PNA. Received Tylenol 1g, 500cc NS, Vancomycin 1750mg, Zosyn 3.375g.   -sepsis now resolved, currently admitted to 3[facility] and being treated for COVID  -lactate resolved from 3 to 0.9  -will not continue Vancomycin and Zosyn at this time, consider restarting as needed  -f/u blood cultures from 9/29- no growth to date

## 2020-09-30 NOTE — PROGRESS NOTE ADULT - PROBLEM SELECTOR PLAN 3
-on Januvia and metformin at home  -mISS HgbA1c of 6.9  -on Januvia and metformin at home  -mISS  -monitor FSG

## 2020-09-30 NOTE — PROGRESS NOTE ADULT - PROBLEM SELECTOR PLAN 9
F: none  E: replete PRN  N: consistent carb,  kosher diet  GI ppx: none  DVT ppx: lovenox 40 mg subQ q12h  CODE: full  Dispo: 3wo

## 2020-09-30 NOTE — PROGRESS NOTE ADULT - SUBJECTIVE AND OBJECTIVE BOX
INTERVAL HPI/OVERNIGHT EVENTS:    SUBJECTIVE: Patient seen and examined at bedside.    OBJECTIVE:    VITAL SIGNS:  ICU Vital Signs Last 24 Hrs  T(C): 37.3 (30 Sep 2020 05:46), Max: 40.2 (29 Sep 2020 17:10)  T(F): 99.1 (30 Sep 2020 05:46), Max: 104.4 (29 Sep 2020 17:10)  HR: 55 (30 Sep 2020 05:46) (55 - 93)  BP: 103/58 (30 Sep 2020 05:46) (103/58 - 131/75)  BP(mean): 77 (30 Sep 2020 05:46) (77 - 98)  ABP: --  ABP(mean): --  RR: 20 (30 Sep 2020 05:46) (20 - 28)  SpO2: 93% (30 Sep 2020 05:46) (91% - 95%)        CAPILLARY BLOOD GLUCOSE      POCT Blood Glucose.: 169 mg/dL (30 Sep 2020 06:20)      PHYSICAL EXAM:      MEDICATIONS:  MEDICATIONS  (STANDING):  ARIPiprazole 10 milliGRAM(s) Oral daily  atorvastatin 20 milliGRAM(s) Oral at bedtime  cholecalciferol 1000 Unit(s) Oral daily  dexAMETHasone  Injectable 6 milliGRAM(s) IV Push every 24 hours  dextrose 5%. 1000 milliLiter(s) (50 mL/Hr) IV Continuous <Continuous>  dextrose 50% Injectable 12.5 Gram(s) IV Push once  dextrose 50% Injectable 25 Gram(s) IV Push once  dextrose 50% Injectable 25 Gram(s) IV Push once  diVALproex DR 1000 milliGRAM(s) Oral at bedtime  enoxaparin Injectable 40 milliGRAM(s) SubCutaneous every 12 hours  ferrous    sulfate 325 milliGRAM(s) Oral daily  influenza  Vaccine (HIGH DOSE) 0.7 milliLiter(s) IntraMuscular once  insulin lispro (HumaLOG) corrective regimen sliding scale   SubCutaneous Before meals and at bedtime  liothyronine 25 MICROGram(s) Oral daily  oxybutynin 10 milliGRAM(s) Oral every 24 hours  PARoxetine 30 milliGRAM(s) Oral daily  polyethylene glycol 3350 17 Gram(s) Oral every 24 hours  QUEtiapine 400 milliGRAM(s) Oral at bedtime  remdesivir  IVPB   IV Intermittent   remdesivir  IVPB 100 milliGRAM(s) IV Intermittent every 24 hours    MEDICATIONS  (PRN):  dextrose 40% Gel 15 Gram(s) Oral once PRN Blood Glucose LESS THAN 70 milliGRAM(s)/deciliter  glucagon  Injectable 1 milliGRAM(s) IntraMuscular once PRN Glucose LESS THAN 70 milligrams/deciliter      ALLERGIES:  Allergies    No Known Allergies    Intolerances        LABS:                        10.7   3.65  )-----------( 93       ( 30 Sep 2020 07:51 )             34.3     09-30    137  |  100  |  20  ----------------------------<  182<H>  4.2   |  24  |  0.92    Ca    8.6      30 Sep 2020 07:51  Mg     2.4     09-30    TPro  7.2  /  Alb  3.5  /  TBili  0.2  /  DBili  x   /  AST  16  /  ALT  9<L>  /  AlkPhos  66  09-30    PT/INR - ( 29 Sep 2020 17:31 )   PT: 14.2 sec;   INR: 1.19          PTT - ( 29 Sep 2020 17:31 )  PTT:33.5 sec      RADIOLOGY & ADDITIONAL TESTS: Reviewed. INTERVAL HPI/OVERNIGHT EVENTS:  Pt. was on 3-6 L NC oxygen o/n, satturating in the low 90%s.     SUBJECTIVE: Patient seen and examined at bedside. Robert CP, SOB, pain, discomfort, nausea, vomiting, fever, chills, abd. pain, urinary/stool changes.    OBJECTIVE:    VITAL SIGNS:  ICU Vital Signs Last 24 Hrs  T(C): 37.3 (30 Sep 2020 05:46), Max: 40.2 (29 Sep 2020 17:10)  T(F): 99.1 (30 Sep 2020 05:46), Max: 104.4 (29 Sep 2020 17:10)  HR: 55 (30 Sep 2020 05:46) (55 - 93)  BP: 103/58 (30 Sep 2020 05:46) (103/58 - 131/75)  BP(mean): 77 (30 Sep 2020 05:46) (77 - 98)  ABP: --  ABP(mean): --  RR: 20 (30 Sep 2020 05:46) (20 - 28)  SpO2: 93% (30 Sep 2020 05:46) (91% - 95%)        CAPILLARY BLOOD GLUCOSE      POCT Blood Glucose.: 169 mg/dL (30 Sep 2020 06:20)      PHYSICAL EXAM:  Constitutional: resting comfortably in bed, NAD  HEENT: NC/AT; PERRL, anicteric sclera; no oropharyngeal erythema or exudates; MMM  Neck: supple, no appreciable JVD  Respiratory: bibasilar crackles appreciated R>L, conversive in full sentences, no w/r/r  Cardiovascular: +S1/S2, RRR  Gastrointestinal: obese abdomen soft, NT/ND  Extremities: WWP; no clubbing, cyanosis or edema  Vascular: 2+ radial, femoral, and DP/PT pulses B/L  Dermatologic: skin normal color and turgor; no visible rashes  Neurological: AAOx3; no focal neurological deficits      MEDICATIONS:  MEDICATIONS  (STANDING):  ARIPiprazole 10 milliGRAM(s) Oral daily  atorvastatin 20 milliGRAM(s) Oral at bedtime  cholecalciferol 1000 Unit(s) Oral daily  dexAMETHasone  Injectable 6 milliGRAM(s) IV Push every 24 hours  dextrose 5%. 1000 milliLiter(s) (50 mL/Hr) IV Continuous <Continuous>  dextrose 50% Injectable 12.5 Gram(s) IV Push once  dextrose 50% Injectable 25 Gram(s) IV Push once  dextrose 50% Injectable 25 Gram(s) IV Push once  diVALproex DR 1000 milliGRAM(s) Oral at bedtime  enoxaparin Injectable 40 milliGRAM(s) SubCutaneous every 12 hours  ferrous    sulfate 325 milliGRAM(s) Oral daily  influenza  Vaccine (HIGH DOSE) 0.7 milliLiter(s) IntraMuscular once  insulin lispro (HumaLOG) corrective regimen sliding scale   SubCutaneous Before meals and at bedtime  liothyronine 25 MICROGram(s) Oral daily  oxybutynin 10 milliGRAM(s) Oral every 24 hours  PARoxetine 30 milliGRAM(s) Oral daily  polyethylene glycol 3350 17 Gram(s) Oral every 24 hours  QUEtiapine 400 milliGRAM(s) Oral at bedtime  remdesivir  IVPB   IV Intermittent   remdesivir  IVPB 100 milliGRAM(s) IV Intermittent every 24 hours    MEDICATIONS  (PRN):  dextrose 40% Gel 15 Gram(s) Oral once PRN Blood Glucose LESS THAN 70 milliGRAM(s)/deciliter  glucagon  Injectable 1 milliGRAM(s) IntraMuscular once PRN Glucose LESS THAN 70 milligrams/deciliter      ALLERGIES:  Allergies    No Known Allergies    Intolerances        LABS:                        10.7   3.65  )-----------( 93       ( 30 Sep 2020 07:51 )             34.3     09-30    137  |  100  |  20  ----------------------------<  182<H>  4.2   |  24  |  0.92    Ca    8.6      30 Sep 2020 07:51  Mg     2.4     09-30    TPro  7.2  /  Alb  3.5  /  TBili  0.2  /  DBili  x   /  AST  16  /  ALT  9<L>  /  AlkPhos  66  09-30    PT/INR - ( 29 Sep 2020 17:31 )   PT: 14.2 sec;   INR: 1.19          PTT - ( 29 Sep 2020 17:31 )  PTT:33.5 sec      RADIOLOGY & ADDITIONAL TESTS: Reviewed. INTERVAL HPI/OVERNIGHT EVENTS:  Pt. was on 3-6 L NC oxygen o/n, satturating in the low 90%s.     SUBJECTIVE: Patient seen and examined at bedside. Denies CP, SOB, pain, discomfort, nausea, vomiting, fever, chills, abd. pain, urinary/stool changes.    OBJECTIVE:    VITAL SIGNS:  ICU Vital Signs Last 24 Hrs  T(C): 37.3 (30 Sep 2020 05:46), Max: 40.2 (29 Sep 2020 17:10)  T(F): 99.1 (30 Sep 2020 05:46), Max: 104.4 (29 Sep 2020 17:10)  HR: 55 (30 Sep 2020 05:46) (55 - 93)  BP: 103/58 (30 Sep 2020 05:46) (103/58 - 131/75)  BP(mean): 77 (30 Sep 2020 05:46) (77 - 98)  ABP: --  ABP(mean): --  RR: 20 (30 Sep 2020 05:46) (20 - 28)  SpO2: 93% (30 Sep 2020 05:46) (91% - 95%)        CAPILLARY BLOOD GLUCOSE      POCT Blood Glucose.: 169 mg/dL (30 Sep 2020 06:20)      PHYSICAL EXAM:  Constitutional: resting comfortably in bed, NAD  HEENT: NC/AT; PERRL, anicteric sclera; no oropharyngeal erythema or exudates; MMM  Neck: supple, no appreciable JVD  Respiratory: bibasilar crackles appreciated R>L, conversive in full sentences, no w/r/r  Cardiovascular: +S1/S2, RRR  Gastrointestinal: obese abdomen soft, NT/ND  Extremities: WWP; no clubbing, cyanosis or edema  Vascular: 2+ radial, femoral, and DP/PT pulses B/L  Dermatologic: skin normal color and turgor; no visible rashes  Neurological: AAOx3; no focal neurological deficits      MEDICATIONS:  MEDICATIONS  (STANDING):  ARIPiprazole 10 milliGRAM(s) Oral daily  atorvastatin 20 milliGRAM(s) Oral at bedtime  cholecalciferol 1000 Unit(s) Oral daily  dexAMETHasone  Injectable 6 milliGRAM(s) IV Push every 24 hours  dextrose 5%. 1000 milliLiter(s) (50 mL/Hr) IV Continuous <Continuous>  dextrose 50% Injectable 12.5 Gram(s) IV Push once  dextrose 50% Injectable 25 Gram(s) IV Push once  dextrose 50% Injectable 25 Gram(s) IV Push once  diVALproex DR 1000 milliGRAM(s) Oral at bedtime  enoxaparin Injectable 40 milliGRAM(s) SubCutaneous every 12 hours  ferrous    sulfate 325 milliGRAM(s) Oral daily  influenza  Vaccine (HIGH DOSE) 0.7 milliLiter(s) IntraMuscular once  insulin lispro (HumaLOG) corrective regimen sliding scale   SubCutaneous Before meals and at bedtime  liothyronine 25 MICROGram(s) Oral daily  oxybutynin 10 milliGRAM(s) Oral every 24 hours  PARoxetine 30 milliGRAM(s) Oral daily  polyethylene glycol 3350 17 Gram(s) Oral every 24 hours  QUEtiapine 400 milliGRAM(s) Oral at bedtime  remdesivir  IVPB   IV Intermittent   remdesivir  IVPB 100 milliGRAM(s) IV Intermittent every 24 hours    MEDICATIONS  (PRN):  dextrose 40% Gel 15 Gram(s) Oral once PRN Blood Glucose LESS THAN 70 milliGRAM(s)/deciliter  glucagon  Injectable 1 milliGRAM(s) IntraMuscular once PRN Glucose LESS THAN 70 milligrams/deciliter      ALLERGIES:  Allergies    No Known Allergies    Intolerances        LABS:                        10.7   3.65  )-----------( 93       ( 30 Sep 2020 07:51 )             34.3     09-30    137  |  100  |  20  ----------------------------<  182<H>  4.2   |  24  |  0.92    Ca    8.6      30 Sep 2020 07:51  Mg     2.4     09-30    TPro  7.2  /  Alb  3.5  /  TBili  0.2  /  DBili  x   /  AST  16  /  ALT  9<L>  /  AlkPhos  66  09-30    PT/INR - ( 29 Sep 2020 17:31 )   PT: 14.2 sec;   INR: 1.19          PTT - ( 29 Sep 2020 17:31 )  PTT:33.5 sec      RADIOLOGY & ADDITIONAL TESTS: Reviewed.

## 2020-09-30 NOTE — CHART NOTE - NSCHARTNOTEFT_GEN_A_CORE
Admitting Diagnosis:   Patient is a 65y old  Female who presents with a chief complaint of shortness of breath (30 Sep 2020 10:13)      Consult: Yes [   ]  No [X   ]    Reason for Initial Nutrition Assessment: LOS      PAST MEDICAL & SURGICAL HISTORY:  Bipolar disorder    Hypothyroid    T2DM (type 2 diabetes mellitus)        Current Nutrition Order: Consistent Carbohydrate w/o snacks- Kosher    PO Intake: Excellent (%) [   ]  Good (50-75%) [ X  ]  Fair (25-50%) [   ]  Poor (<25%) [   ]    GI Issues: RN denies pt with n/v/c/d at this time, last BM noted 9/29    Pain: no complaints of pain/discomfort at this time per chart    Skin Integrity: no edema, no pressure injuries noted at this time. Jonas score 19    Labs:   09-30    137  |  100  |  20  ----------------------------<  182<H>  4.2   |  24  |  0.92    Ca    8.6      30 Sep 2020 07:51  Mg     2.4     09-30    TPro  7.2  /  Alb  3.5  /  TBili  0.2  /  DBili  x   /  AST  16  /  ALT  9<L>  /  AlkPhos  66  09-30    CAPILLARY BLOOD GLUCOSE      POCT Blood Glucose.: 157 mg/dL (30 Sep 2020 12:00)  POCT Blood Glucose.: 169 mg/dL (30 Sep 2020 06:20)  POCT Blood Glucose.: 101 mg/dL (30 Sep 2020 01:12)    Nutritionally Pertinent Lab Values: POCT (9/30): 101-157, (9/29): 117, HbA1c (9/30): 6.9%,     Medications:  MEDICATIONS  (STANDING):  ARIPiprazole 10 milliGRAM(s) Oral daily  atorvastatin 20 milliGRAM(s) Oral at bedtime  cholecalciferol 1000 Unit(s) Oral daily  dexAMETHasone  Injectable 6 milliGRAM(s) IV Push every 24 hours  dextrose 5%. 1000 milliLiter(s) (50 mL/Hr) IV Continuous <Continuous>  dextrose 50% Injectable 12.5 Gram(s) IV Push once  dextrose 50% Injectable 25 Gram(s) IV Push once  dextrose 50% Injectable 25 Gram(s) IV Push once  diVALproex DR 1000 milliGRAM(s) Oral at bedtime  enoxaparin Injectable 40 milliGRAM(s) SubCutaneous every 12 hours  ferrous    sulfate 325 milliGRAM(s) Oral daily  influenza  Vaccine (HIGH DOSE) 0.7 milliLiter(s) IntraMuscular once  insulin lispro (HumaLOG) corrective regimen sliding scale   SubCutaneous Before meals and at bedtime  liothyronine 25 MICROGram(s) Oral daily  oxybutynin 10 milliGRAM(s) Oral every 24 hours  PARoxetine 30 milliGRAM(s) Oral daily  polyethylene glycol 3350 17 Gram(s) Oral every 24 hours  QUEtiapine 400 milliGRAM(s) Oral at bedtime  remdesivir  IVPB   IV Intermittent   remdesivir  IVPB 100 milliGRAM(s) IV Intermittent every 24 hours    MEDICATIONS  (PRN):  dextrose 40% Gel 15 Gram(s) Oral once PRN Blood Glucose LESS THAN 70 milliGRAM(s)/deciliter  glucagon  Injectable 1 milliGRAM(s) IntraMuscular once PRN Glucose LESS THAN 70 milligrams/deciliter      Admitted Anthropometrics:  Height: 65" Weight: 249lbs/113kg  IBW 125lbs/56.8kg+/-10%, %%, BMI 41.5kg/m2    Weight Change: Unable to obtain weight history d/t inability to reach pt or listed emergency contacts after multiple attempts.    Nutrition Focused Physical Exam: Completed [   ]  Unable to complete [ X  ]-N/A d/t COVID    Estimated energy needs:   Ideal body weight (56.8kg) used for calculations as pt >120% of IBW. Needs adjusted for age, BMI, hypermetabolic state 2/2 COVID infection    Energy: 1420-1704kcal/day (25-30kcal/kg)  Protein: 68-80g pro/day (1.2-1.4g pro/kg)  Fluids per team    Subjective:   65F w PMHx bipolar disorder, PD, HLD, T2DM, anemia, multiple UTIs on chronic nitrofurantoin, hypothyroidism presenting from nursing home with cough and fever, admitted to [facility] for management of sepsis 2/2 COVID19 PNA vs bacterial PNA. Sepsis now resolved. Pt. was on 3-6 L NC oxygen o/n, saturating in the low 90%. Pt now breathing on NRB mask 10 L and saturating in low 90%s.   Unable to conduct a face to face interview or nutrition-focused physical exam due to limited contact restrictions related to the pt's medical condition and isolation precautions. Unable to reach pt or listed emergency contacts after multiple attempts. Per RN, pt eating is eating well with no issues, no observed chewing or swallowing issues. NKFA per chart. RN denies pt with n/v/c/d. Last BM noted 9/29. Please see nutrition recommendations below, RD to monitor and f/u per protocol.     Nutrition Diagnosis: Increased nutrient needs RT increased demand for energy and protein AEB hypermetabolic state 2/2 viral infection (COVID19+)     Goal: pt to continuously meet >75% estimated nutrient needs with good tolerance    Recommendations:  1) Continue with current Consistent Carbohydrate w/o snacks, Kosher diet  2) POCT q6hrs, maintain tight BG control  3) Monitor lytes and replete prn   4) Monitor weights, labs, skin integrity, GI distress, nutrient consumption  5) Pain and bowel regimen per team     Education: N/A at this time    Risk Level: High [   ] Moderate [ X  ] Low [   ]    RD to follow up per protocol.  Erin Martin RDN Admitting Diagnosis:   Patient is a 65y old  Female who presents with a chief complaint of shortness of breath (30 Sep 2020 10:13)      Consult: Yes [   ]  No [X   ]    Reason for Initial Nutrition Assessment: LOS      PAST MEDICAL & SURGICAL HISTORY:  Bipolar disorder    Hypothyroid    T2DM (type 2 diabetes mellitus)        Current Nutrition Order: Consistent Carbohydrate w/o snacks- Kosher    PO Intake: Excellent (%) [   ]  Good (50-75%) [ X  ]  Fair (25-50%) [   ]  Poor (<25%) [   ]    GI Issues: RN denies pt with n/v/c/d at this time, last BM noted 9/29    Pain: no complaints of pain/discomfort at this time per chart    Skin Integrity: no edema, no pressure injuries noted at this time. Jonas score 19    Labs:   09-30    137  |  100  |  20  ----------------------------<  182<H>  4.2   |  24  |  0.92    Ca    8.6      30 Sep 2020 07:51  Mg     2.4     09-30    TPro  7.2  /  Alb  3.5  /  TBili  0.2  /  DBili  x   /  AST  16  /  ALT  9<L>  /  AlkPhos  66  09-30    CAPILLARY BLOOD GLUCOSE      POCT Blood Glucose.: 157 mg/dL (30 Sep 2020 12:00)  POCT Blood Glucose.: 169 mg/dL (30 Sep 2020 06:20)  POCT Blood Glucose.: 101 mg/dL (30 Sep 2020 01:12)    Nutritionally Pertinent Lab Values: POCT (9/30): 101-157, (9/29): 117, HbA1c (9/30): 6.9%,     Medications:  MEDICATIONS  (STANDING):  ARIPiprazole 10 milliGRAM(s) Oral daily  atorvastatin 20 milliGRAM(s) Oral at bedtime  cholecalciferol 1000 Unit(s) Oral daily  dexAMETHasone  Injectable 6 milliGRAM(s) IV Push every 24 hours  dextrose 5%. 1000 milliLiter(s) (50 mL/Hr) IV Continuous <Continuous>  dextrose 50% Injectable 12.5 Gram(s) IV Push once  dextrose 50% Injectable 25 Gram(s) IV Push once  dextrose 50% Injectable 25 Gram(s) IV Push once  diVALproex DR 1000 milliGRAM(s) Oral at bedtime  enoxaparin Injectable 40 milliGRAM(s) SubCutaneous every 12 hours  ferrous    sulfate 325 milliGRAM(s) Oral daily  influenza  Vaccine (HIGH DOSE) 0.7 milliLiter(s) IntraMuscular once  insulin lispro (HumaLOG) corrective regimen sliding scale   SubCutaneous Before meals and at bedtime  liothyronine 25 MICROGram(s) Oral daily  oxybutynin 10 milliGRAM(s) Oral every 24 hours  PARoxetine 30 milliGRAM(s) Oral daily  polyethylene glycol 3350 17 Gram(s) Oral every 24 hours  QUEtiapine 400 milliGRAM(s) Oral at bedtime  remdesivir  IVPB   IV Intermittent   remdesivir  IVPB 100 milliGRAM(s) IV Intermittent every 24 hours    MEDICATIONS  (PRN):  dextrose 40% Gel 15 Gram(s) Oral once PRN Blood Glucose LESS THAN 70 milliGRAM(s)/deciliter  glucagon  Injectable 1 milliGRAM(s) IntraMuscular once PRN Glucose LESS THAN 70 milligrams/deciliter      Admitted Anthropometrics:  Height: 65" Weight: 249lbs/113kg  IBW 125lbs/56.8kg+/-10%, %%, BMI 41.5kg/m2    Weight Change: Unable to obtain weight history d/t inability to reach pt or listed emergency contacts after multiple attempts.    Nutrition Focused Physical Exam: Completed [   ]  Unable to complete [ X  ]-N/A d/t COVID    Estimated energy needs:   Ideal body weight (56.8kg) used for calculations as pt >120% of IBW. Needs adjusted for age, BMI, hypermetabolic state 2/2 COVID infection    Energy: 1420-1704kcal/day (25-30kcal/kg)  Protein: 68-80g pro/day (1.2-1.4g pro/kg)  Fluids per team    Subjective:   65F w PMHx bipolar disorder, PD, HLD, T2DM, anemia, multiple UTIs on chronic nitrofurantoin, hypothyroidism presenting from nursing home with cough and fever, admitted to [facility] for management of sepsis 2/2 COVID19 PNA vs bacterial PNA. Sepsis now resolved. Pt. was on 3-6 L NC oxygen o/n, saturating in the low 90%. Pt now breathing on NRB mask 10 L and saturating in low 90%s.   Unable to conduct a face to face interview or nutrition-focused physical exam due to limited contact restrictions related to the pt's medical condition and isolation precautions. Unable to reach pt or listed emergency contacts after multiple attempts. Per RN, pt eating is eating well with no issues, no observed chewing or swallowing issues. NKFA per chart. RN denies pt with n/v/c/d. Last BM noted 9/29. Please see nutrition recommendations below, RD to monitor and f/u per protocol.     Nutrition Diagnosis: Increased nutrient needs RT increased demand for energy and protein AEB hypermetabolic state 2/2 viral infection (COVID19+)     Goal: pt to continuously meet >75% estimated nutrient needs with good tolerance    Recommendations:  1) Continue with current Consistent Carbohydrate w/o snacks, Kosher diet  2) POCT q6hrs, maintain tight BG control  3) Monitor lytes and replete prn   4) Continue with current micronutrient supplementation, at team discretion  5) Monitor weights, labs, skin integrity, GI distress, nutrient consumption  6) Pain and bowel regimen per team     Education: N/A at this time    Risk Level: High [   ] Moderate [ X  ] Low [   ]    RD to follow up per protocol.  Erin Martin RDN

## 2020-09-30 NOTE — PROGRESS NOTE ADULT - PROBLEM SELECTOR PLAN 2
Pt COVID postive x2,  presenting with fever to 101.4F  -admit to COVID unit  -stable on 2L NC  -isolation precautions  -start remdesivir for 5 days (9/29 - )  -start decadron 6mg daily   -f/u daily COVID labs Pt COVID postive x2,  presenting with fever of 101.4F in ED on 9/29  -contact/droplet isolation precautions  -breathing on NRB mask 10 L and satturating in low 90%s  -started on remdesivir 200 mg x1, then 100 mg x 4 days (last day 10/3 @10 PM)  -continue decadron IV 6mg daily   -f/u daily COVID labs- CBC w/ diff, CMP, ferritin, CRP, D-dimer, Mg, Phos Pt COVID postive x2,  presenting with fever of 101.4F in ED on 9/29  -contact/droplet isolation precautions  -breathing on NRB mask 10 L and satturating in low 90%s  -started on remdesivir 200 mg x1, then 100 mg x 4 days (last day 10/3 @10 PM)  -continue decadron IV 6mg daily (first dose 9/30 12 AM)  -f/u daily COVID labs- CBC w/ diff, CMP, ferritin, CRP, D-dimer, Mg, Phos

## 2020-09-30 NOTE — PROGRESS NOTE ADULT - PROBLEM SELECTOR PLAN 6
F: none  E: replete PRN  N: consistent carb,  kosher diet  GI ppx:  DVT ppx:    CODE: full  Dispo: 3wo -oxybutynin 10 mg q24h -oxybutynin 5 mg BID

## 2020-10-01 LAB
ALBUMIN SERPL ELPH-MCNC: 3.6 G/DL — SIGNIFICANT CHANGE UP (ref 3.3–5)
ALP SERPL-CCNC: 66 U/L — SIGNIFICANT CHANGE UP (ref 40–120)
ALT FLD-CCNC: 9 U/L — LOW (ref 10–45)
ANION GAP SERPL CALC-SCNC: 12 MMOL/L — SIGNIFICANT CHANGE UP (ref 5–17)
AST SERPL-CCNC: 14 U/L — SIGNIFICANT CHANGE UP (ref 10–40)
BASOPHILS # BLD AUTO: 0 K/UL — SIGNIFICANT CHANGE UP (ref 0–0.2)
BASOPHILS NFR BLD AUTO: 0 % — SIGNIFICANT CHANGE UP (ref 0–2)
BILIRUB SERPL-MCNC: 0.2 MG/DL — SIGNIFICANT CHANGE UP (ref 0.2–1.2)
BLD GP AB SCN SERPL QL: NEGATIVE — SIGNIFICANT CHANGE UP
BUN SERPL-MCNC: 26 MG/DL — HIGH (ref 7–23)
CALCIUM SERPL-MCNC: 8.8 MG/DL — SIGNIFICANT CHANGE UP (ref 8.4–10.5)
CHLORIDE SERPL-SCNC: 104 MMOL/L — SIGNIFICANT CHANGE UP (ref 96–108)
CO2 SERPL-SCNC: 26 MMOL/L — SIGNIFICANT CHANGE UP (ref 22–31)
CREAT SERPL-MCNC: 0.8 MG/DL — SIGNIFICANT CHANGE UP (ref 0.5–1.3)
CRP SERPL-MCNC: 5.72 MG/DL — HIGH (ref 0–0.4)
D DIMER BLD IA.RAPID-MCNC: <150 NG/ML DDU — SIGNIFICANT CHANGE UP
EOSINOPHIL # BLD AUTO: 0 K/UL — SIGNIFICANT CHANGE UP (ref 0–0.5)
EOSINOPHIL NFR BLD AUTO: 0 % — SIGNIFICANT CHANGE UP (ref 0–6)
FERRITIN SERPL-MCNC: 232 NG/ML — HIGH (ref 15–150)
GLUCOSE BLDC GLUCOMTR-MCNC: 167 MG/DL — HIGH (ref 70–99)
GLUCOSE BLDC GLUCOMTR-MCNC: 185 MG/DL — HIGH (ref 70–99)
GLUCOSE BLDC GLUCOMTR-MCNC: 265 MG/DL — HIGH (ref 70–99)
GLUCOSE BLDC GLUCOMTR-MCNC: 99 MG/DL — SIGNIFICANT CHANGE UP (ref 70–99)
GLUCOSE SERPL-MCNC: 191 MG/DL — HIGH (ref 70–99)
HCT VFR BLD CALC: 38.3 % — SIGNIFICANT CHANGE UP (ref 34.5–45)
HGB BLD-MCNC: 11.4 G/DL — LOW (ref 11.5–15.5)
IMM GRANULOCYTES NFR BLD AUTO: 1.2 % — SIGNIFICANT CHANGE UP (ref 0–1.5)
LYMPHOCYTES # BLD AUTO: 1.2 K/UL — SIGNIFICANT CHANGE UP (ref 1–3.3)
LYMPHOCYTES # BLD AUTO: 24.6 % — SIGNIFICANT CHANGE UP (ref 13–44)
MAGNESIUM SERPL-MCNC: 2.7 MG/DL — HIGH (ref 1.6–2.6)
MCHC RBC-ENTMCNC: 24.4 PG — LOW (ref 27–34)
MCHC RBC-ENTMCNC: 29.8 GM/DL — LOW (ref 32–36)
MCV RBC AUTO: 81.8 FL — SIGNIFICANT CHANGE UP (ref 80–100)
MONOCYTES # BLD AUTO: 0.2 K/UL — SIGNIFICANT CHANGE UP (ref 0–0.9)
MONOCYTES NFR BLD AUTO: 4.1 % — SIGNIFICANT CHANGE UP (ref 2–14)
NEUTROPHILS # BLD AUTO: 3.41 K/UL — SIGNIFICANT CHANGE UP (ref 1.8–7.4)
NEUTROPHILS NFR BLD AUTO: 70.1 % — SIGNIFICANT CHANGE UP (ref 43–77)
NRBC # BLD: 0 /100 WBCS — SIGNIFICANT CHANGE UP (ref 0–0)
PHOSPHATE SERPL-MCNC: 4.6 MG/DL — HIGH (ref 2.5–4.5)
PLATELET # BLD AUTO: 112 K/UL — LOW (ref 150–400)
POTASSIUM SERPL-MCNC: 4.3 MMOL/L — SIGNIFICANT CHANGE UP (ref 3.5–5.3)
POTASSIUM SERPL-SCNC: 4.3 MMOL/L — SIGNIFICANT CHANGE UP (ref 3.5–5.3)
PROT SERPL-MCNC: 7.5 G/DL — SIGNIFICANT CHANGE UP (ref 6–8.3)
RBC # BLD: 4.68 M/UL — SIGNIFICANT CHANGE UP (ref 3.8–5.2)
RBC # FLD: 15.9 % — HIGH (ref 10.3–14.5)
RH IG SCN BLD-IMP: POSITIVE — SIGNIFICANT CHANGE UP
SODIUM SERPL-SCNC: 142 MMOL/L — SIGNIFICANT CHANGE UP (ref 135–145)
WBC # BLD: 4.87 K/UL — SIGNIFICANT CHANGE UP (ref 3.8–10.5)
WBC # FLD AUTO: 4.87 K/UL — SIGNIFICANT CHANGE UP (ref 3.8–10.5)

## 2020-10-01 PROCEDURE — 99233 SBSQ HOSP IP/OBS HIGH 50: CPT | Mod: CS,GC

## 2020-10-01 RX ORDER — BENZOCAINE AND MENTHOL 5; 1 G/100ML; G/100ML
1 LIQUID ORAL
Refills: 0 | Status: DISCONTINUED | OUTPATIENT
Start: 2020-10-01 | End: 2020-10-14

## 2020-10-01 RX ADMIN — Medication 2: at 07:15

## 2020-10-01 RX ADMIN — Medication 5 MILLIGRAM(S): at 18:31

## 2020-10-01 RX ADMIN — QUETIAPINE FUMARATE 400 MILLIGRAM(S): 200 TABLET, FILM COATED ORAL at 21:26

## 2020-10-01 RX ADMIN — Medication 325 MILLIGRAM(S): at 10:03

## 2020-10-01 RX ADMIN — Medication 1000 UNIT(S): at 14:41

## 2020-10-01 RX ADMIN — ARIPIPRAZOLE 10 MILLIGRAM(S): 15 TABLET ORAL at 10:03

## 2020-10-01 RX ADMIN — LIOTHYRONINE SODIUM 25 MICROGRAM(S): 25 TABLET ORAL at 05:59

## 2020-10-01 RX ADMIN — ENOXAPARIN SODIUM 40 MILLIGRAM(S): 100 INJECTION SUBCUTANEOUS at 05:59

## 2020-10-01 RX ADMIN — Medication 6: at 12:29

## 2020-10-01 RX ADMIN — ENOXAPARIN SODIUM 40 MILLIGRAM(S): 100 INJECTION SUBCUTANEOUS at 17:46

## 2020-10-01 RX ADMIN — Medication 6 MILLIGRAM(S): at 21:26

## 2020-10-01 RX ADMIN — Medication 30 MILLIGRAM(S): at 10:02

## 2020-10-01 RX ADMIN — DIVALPROEX SODIUM 1000 MILLIGRAM(S): 500 TABLET, DELAYED RELEASE ORAL at 21:26

## 2020-10-01 RX ADMIN — ATORVASTATIN CALCIUM 20 MILLIGRAM(S): 80 TABLET, FILM COATED ORAL at 21:26

## 2020-10-01 RX ADMIN — REMDESIVIR 500 MILLIGRAM(S): 5 INJECTION INTRAVENOUS at 21:26

## 2020-10-01 RX ADMIN — BENZOCAINE AND MENTHOL 1 LOZENGE: 5; 1 LIQUID ORAL at 10:03

## 2020-10-01 RX ADMIN — Medication 5 MILLIGRAM(S): at 05:59

## 2020-10-01 RX ADMIN — Medication 2: at 17:46

## 2020-10-01 NOTE — PROGRESS NOTE ADULT - SUBJECTIVE AND OBJECTIVE BOX
INTERVAL HPI/OVERNIGHT EVENTS:    SUBJECTIVE: Patient seen and examined at bedside.    OBJECTIVE:    VITAL SIGNS:  ICU Vital Signs Last 24 Hrs  T(C): 36.8 (30 Sep 2020 20:38), Max: 37.1 (30 Sep 2020 14:00)  T(F): 98.2 (30 Sep 2020 20:38), Max: 98.8 (30 Sep 2020 14:00)  HR: 52 (30 Sep 2020 20:38) (50 - 52)  BP: 128/61 (30 Sep 2020 20:38) (101/53 - 128/61)  BP(mean): 87 (30 Sep 2020 20:38) (87 - 87)  ABP: --  ABP(mean): --  RR: 20 (30 Sep 2020 20:38) (18 - 20)  SpO2: 90% (30 Sep 2020 20:38) (90% - 93%)        CAPILLARY BLOOD GLUCOSE      POCT Blood Glucose.: 137 mg/dL (30 Sep 2020 21:54)      PHYSICAL EXAM:      MEDICATIONS:  MEDICATIONS  (STANDING):  ARIPiprazole 10 milliGRAM(s) Oral daily  atorvastatin 20 milliGRAM(s) Oral at bedtime  cholecalciferol 1000 Unit(s) Oral daily  dexAMETHasone  Injectable 6 milliGRAM(s) IV Push every 24 hours  dextrose 5%. 1000 milliLiter(s) (50 mL/Hr) IV Continuous <Continuous>  dextrose 50% Injectable 12.5 Gram(s) IV Push once  dextrose 50% Injectable 25 Gram(s) IV Push once  dextrose 50% Injectable 25 Gram(s) IV Push once  diVALproex ER 1000 milliGRAM(s) Oral at bedtime  enoxaparin Injectable 40 milliGRAM(s) SubCutaneous every 12 hours  ferrous    sulfate 325 milliGRAM(s) Oral daily  influenza  Vaccine (HIGH DOSE) 0.7 milliLiter(s) IntraMuscular once  insulin lispro (HumaLOG) corrective regimen sliding scale   SubCutaneous Before meals and at bedtime  liothyronine 25 MICROGram(s) Oral daily  oxybutynin 5 milliGRAM(s) Oral two times a day  PARoxetine 30 milliGRAM(s) Oral daily  polyethylene glycol 3350 17 Gram(s) Oral every 24 hours  QUEtiapine 400 milliGRAM(s) Oral at bedtime  remdesivir  IVPB   IV Intermittent   remdesivir  IVPB 100 milliGRAM(s) IV Intermittent every 24 hours    MEDICATIONS  (PRN):  dextrose 40% Gel 15 Gram(s) Oral once PRN Blood Glucose LESS THAN 70 milliGRAM(s)/deciliter  glucagon  Injectable 1 milliGRAM(s) IntraMuscular once PRN Glucose LESS THAN 70 milligrams/deciliter      ALLERGIES:  Allergies    No Known Allergies    Intolerances        LABS:                        10.7   3.65  )-----------( 93       ( 30 Sep 2020 07:51 )             34.3     09-30    137  |  100  |  20  ----------------------------<  182<H>  4.2   |  24  |  0.92    Ca    8.6      30 Sep 2020 07:51  Mg     2.4     09-30    TPro  7.2  /  Alb  3.5  /  TBili  0.2  /  DBili  x   /  AST  16  /  ALT  9<L>  /  AlkPhos  66  09-30    PT/INR - ( 29 Sep 2020 17:31 )   PT: 14.2 sec;   INR: 1.19          PTT - ( 29 Sep 2020 17:31 )  PTT:33.5 sec      RADIOLOGY & ADDITIONAL TESTS: Reviewed. INTERVAL HPI/OVERNIGHT EVENTS:  Pt. was bradycardic to HR 45 while sleepng, but asymptomatic. No acute events overnight.     SUBJECTIVE: Patient seen and examined at bedside. Cepacol (lozenges) ordered for sore throat. Pt. breathing on nonrebreather mask 10 L/min O2. Pt. reported chest pain this AM, ordered for EKG.     OBJECTIVE:    VITAL SIGNS:  ICU Vital Signs Last 24 Hrs  T(C): 36.8 (30 Sep 2020 20:38), Max: 37.1 (30 Sep 2020 14:00)  T(F): 98.2 (30 Sep 2020 20:38), Max: 98.8 (30 Sep 2020 14:00)  HR: 52 (30 Sep 2020 20:38) (50 - 52)  BP: 128/61 (30 Sep 2020 20:38) (101/53 - 128/61)  BP(mean): 87 (30 Sep 2020 20:38) (87 - 87)  ABP: --  ABP(mean): --  RR: 20 (30 Sep 2020 20:38) (18 - 20)  SpO2: 90% (30 Sep 2020 20:38) (90% - 93%)        CAPILLARY BLOOD GLUCOSE      POCT Blood Glucose.: 137 mg/dL (30 Sep 2020 21:54)      PHYSICAL EXAM:  Constitutional: resting comfortably in bed, NAD, on nonrebreather mask 10 L/min  HEENT: NC/AT; PERRL, anicteric sclera; no oropharyngeal erythema or exudates; MMM  Neck: supple, no appreciable JVD  Respiratory: bibasilar crackles appreciated R>L, conversive in full sentences, no w/r/r  Cardiovascular: +S1/S2, RRR  Gastrointestinal: obese abdomen soft, NT/ND  Extremities: WWP; no clubbing, cyanosis or edema  Vascular: 2+ radial, femoral, and DP/PT pulses B/L  Dermatologic: skin normal color and turgor; no visible rashes  Neurological: AAOx3; no focal neurological deficits      MEDICATIONS:  MEDICATIONS  (STANDING):  ARIPiprazole 10 milliGRAM(s) Oral daily  atorvastatin 20 milliGRAM(s) Oral at bedtime  cholecalciferol 1000 Unit(s) Oral daily  dexAMETHasone  Injectable 6 milliGRAM(s) IV Push every 24 hours  dextrose 5%. 1000 milliLiter(s) (50 mL/Hr) IV Continuous <Continuous>  dextrose 50% Injectable 12.5 Gram(s) IV Push once  dextrose 50% Injectable 25 Gram(s) IV Push once  dextrose 50% Injectable 25 Gram(s) IV Push once  diVALproex ER 1000 milliGRAM(s) Oral at bedtime  enoxaparin Injectable 40 milliGRAM(s) SubCutaneous every 12 hours  ferrous    sulfate 325 milliGRAM(s) Oral daily  influenza  Vaccine (HIGH DOSE) 0.7 milliLiter(s) IntraMuscular once  insulin lispro (HumaLOG) corrective regimen sliding scale   SubCutaneous Before meals and at bedtime  liothyronine 25 MICROGram(s) Oral daily  oxybutynin 5 milliGRAM(s) Oral two times a day  PARoxetine 30 milliGRAM(s) Oral daily  polyethylene glycol 3350 17 Gram(s) Oral every 24 hours  QUEtiapine 400 milliGRAM(s) Oral at bedtime  remdesivir  IVPB   IV Intermittent   remdesivir  IVPB 100 milliGRAM(s) IV Intermittent every 24 hours    MEDICATIONS  (PRN):  dextrose 40% Gel 15 Gram(s) Oral once PRN Blood Glucose LESS THAN 70 milliGRAM(s)/deciliter  glucagon  Injectable 1 milliGRAM(s) IntraMuscular once PRN Glucose LESS THAN 70 milligrams/deciliter      ALLERGIES:  Allergies    No Known Allergies    Intolerances      LABS:                        11.4   4.87  )-----------( 112      ( 01 Oct 2020 07:58 )             38.3     10-01    142  |  104  |  26<H>  ----------------------------<  191<H>  4.3   |  26  |  0.80    Ca    8.8      01 Oct 2020 07:58  Phos  4.6     10-01  Mg     2.7     10-01    TPro  7.5  /  Alb  3.6  /  TBili  0.2  /  DBili  x   /  AST  14  /  ALT  9<L>  /  AlkPhos  66  10-01    PT/INR - ( 29 Sep 2020 17:31 )   PT: 14.2 sec;   INR: 1.19          PTT - ( 29 Sep 2020 17:31 )  PTT:33.5 sec      RADIOLOGY & ADDITIONAL TESTS: Reviewed.

## 2020-10-01 NOTE — PROGRESS NOTE ADULT - ASSESSMENT
65F w PMHx bipolar disorder, PD, HLD, T2DM, anemia, multiple UTIs on chronic nitrofurantoin, hypothyroidism presenting from nursing home with cough and fever, admitted to [facility] for management of sepsis 2/2 COVID19 PNA vs bacterial PNA.

## 2020-10-01 NOTE — PROGRESS NOTE ADULT - PROBLEM SELECTOR PLAN 5
-Aripiprazole 10mg q24h  -depakote 1000 mg PO qhs  -quetiapine 400 mg PO qhs  -paroxetine 30mg PO q24h  -monitor for QTc prolongation, extrapyramidal side effects. QTc 416 on admission EKG

## 2020-10-01 NOTE — PROGRESS NOTE ADULT - PROBLEM SELECTOR PLAN 9
F: none  E: replete PRN  N: consistent carb,  kosher diet  GI ppx: none  DVT ppx: lovenox 40 mg subQ q12h  CODE: full  Dispo: 3wo F: none  E: replete PRN  N: consistent carb, kosher diet  GI ppx: none  DVT ppx: lovenox 40 mg subQ q12h  CODE: full  Dispo: 3wo

## 2020-10-01 NOTE — PROGRESS NOTE ADULT - PROBLEM SELECTOR PLAN 1
Pt presented to ED with severe sepsis: lactate 3 (resolved to 0.9 after 500cc bolus), T 101.4, HR 93, RR 24. CXR showing b/l patchy infiltrates. Pt COVID+ x2 with CRP 7.57, however procalcitonin 0.32. PNA likely 2/2 COVID vs. bacterial PNA. Received Tylenol 1g, 500cc NS, Vancomycin 1750mg, Zosyn 3.375g.   -sepsis now resolved, currently admitted to 3[facility] and being treated for COVID  -lactate resolved from 3 to 0.9  -will not continue Vancomycin and Zosyn at this time, consider restarting as needed  -f/u blood cultures from 9/29- no growth to date sepsis now resolved- pt presented to ED with severe sepsis: lactate 3 (resolved to 0.9 after 500cc bolus), T 101.4, HR 93, RR 24. CXR showing b/l patchy infiltrates. Pt COVID+ x2 with CRP 7.57, however procalcitonin 0.32. PNA likely 2/2 COVID vs. bacterial PNA. Received Tylenol 1g, 500cc NS, Vancomycin 1750mg, Zosyn 3.375g.   -sepsis now resolved, currently admitted to 3[facility] and being treated for COVID  -lactate resolved from 3 to 0.9  -will not continue Vancomycin and Zosyn at this time, consider restarting as needed  -f/u blood cultures from 9/29- no growth to date

## 2020-10-01 NOTE — PROGRESS NOTE ADULT - PROBLEM SELECTOR PLAN 2
Pt COVID postive x2,  presenting with fever of 101.4F in ED on 9/29  -contact/droplet isolation precautions  -breathing on NRB mask 10 L and satturating in low 90%s  -started on remdesivir 200 mg x1, then 100 mg x 4 days (last day 10/3 @10 PM)  -continue decadron IV 6mg daily (first dose 9/30 12 AM)  -f/u daily COVID labs- CBC w/ diff, CMP, ferritin, CRP, D-dimer, Mg, Phos Pt COVID postive x2,  presenting with fever of 101.4F in ED on 9/29  -contact/droplet isolation precautions  -now breathing on NRB mask 10 L/min and satturating in low 90%s- will try to wean to 6 L/min, and assess O2 saturation while out of bed.   -on remdesivir 200 mg x1, then 100 mg x 4 days (last day 10/3 @10 PM)  -continue decadron IV 6mg daily (first dose 9/30 12 AM)  -f/u daily COVID labs- CBC w/ diff, CMP, ferritin, CRP, D-dimer, Mg, Phos  -pt. reported chest pain on 10/1 AM- f/u EKG

## 2020-10-02 DIAGNOSIS — J96.01 ACUTE RESPIRATORY FAILURE WITH HYPOXIA: ICD-10-CM

## 2020-10-02 DIAGNOSIS — R00.1 BRADYCARDIA, UNSPECIFIED: ICD-10-CM

## 2020-10-02 LAB
BASE EXCESS BLDA CALC-SCNC: 1.5 MMOL/L — SIGNIFICANT CHANGE UP (ref -2–3)
D DIMER BLD IA.RAPID-MCNC: 283 NG/ML DDU — HIGH
GLUCOSE BLDC GLUCOMTR-MCNC: 173 MG/DL — HIGH (ref 70–99)
GLUCOSE BLDC GLUCOMTR-MCNC: 236 MG/DL — HIGH (ref 70–99)
GLUCOSE BLDC GLUCOMTR-MCNC: 266 MG/DL — HIGH (ref 70–99)
GLUCOSE BLDC GLUCOMTR-MCNC: 360 MG/DL — HIGH (ref 70–99)
HCO3 BLDA-SCNC: 25 MMOL/L — SIGNIFICANT CHANGE UP (ref 21–28)
HCT VFR BLD CALC: 37.5 % — SIGNIFICANT CHANGE UP (ref 34.5–45)
HGB BLD-MCNC: 11.6 G/DL — SIGNIFICANT CHANGE UP (ref 11.5–15.5)
LACTATE SERPL-SCNC: 3.7 MMOL/L — HIGH (ref 0.5–2)
MCHC RBC-ENTMCNC: 25.3 PG — LOW (ref 27–34)
MCHC RBC-ENTMCNC: 30.9 GM/DL — LOW (ref 32–36)
MCV RBC AUTO: 81.7 FL — SIGNIFICANT CHANGE UP (ref 80–100)
NRBC # BLD: 0 /100 WBCS — SIGNIFICANT CHANGE UP (ref 0–0)
PCO2 BLDA: 35 MMHG — SIGNIFICANT CHANGE UP (ref 32–45)
PH BLDA: 7.47 — HIGH (ref 7.35–7.45)
PLATELET # BLD AUTO: 134 K/UL — LOW (ref 150–400)
PO2 BLDA: 65 MMHG — LOW (ref 83–108)
RBC # BLD: 4.59 M/UL — SIGNIFICANT CHANGE UP (ref 3.8–5.2)
RBC # FLD: 15.8 % — HIGH (ref 10.3–14.5)
SAO2 % BLDA: 94 % — LOW (ref 95–100)
TSH SERPL-MCNC: 0.1 UIU/ML — LOW (ref 0.35–4.94)
WBC # BLD: 5.24 K/UL — SIGNIFICANT CHANGE UP (ref 3.8–10.5)
WBC # FLD AUTO: 5.24 K/UL — SIGNIFICANT CHANGE UP (ref 3.8–10.5)

## 2020-10-02 PROCEDURE — 99222 1ST HOSP IP/OBS MODERATE 55: CPT | Mod: CS

## 2020-10-02 PROCEDURE — 99223 1ST HOSP IP/OBS HIGH 75: CPT

## 2020-10-02 PROCEDURE — 99233 SBSQ HOSP IP/OBS HIGH 50: CPT | Mod: CS,GC

## 2020-10-02 PROCEDURE — 93010 ELECTROCARDIOGRAM REPORT: CPT

## 2020-10-02 PROCEDURE — 71045 X-RAY EXAM CHEST 1 VIEW: CPT | Mod: 26

## 2020-10-02 RX ORDER — DOBUTAMINE HCL 250MG/20ML
1.48 VIAL (ML) INTRAVENOUS
Qty: 1000 | Refills: 0 | Status: DISCONTINUED | OUTPATIENT
Start: 2020-10-02 | End: 2020-10-04

## 2020-10-02 RX ADMIN — DIVALPROEX SODIUM 1000 MILLIGRAM(S): 500 TABLET, DELAYED RELEASE ORAL at 21:57

## 2020-10-02 RX ADMIN — ARIPIPRAZOLE 10 MILLIGRAM(S): 15 TABLET ORAL at 12:19

## 2020-10-02 RX ADMIN — Medication 30 MILLIGRAM(S): at 12:19

## 2020-10-02 RX ADMIN — LIOTHYRONINE SODIUM 25 MICROGRAM(S): 25 TABLET ORAL at 05:08

## 2020-10-02 RX ADMIN — REMDESIVIR 500 MILLIGRAM(S): 5 INJECTION INTRAVENOUS at 21:57

## 2020-10-02 RX ADMIN — ENOXAPARIN SODIUM 40 MILLIGRAM(S): 100 INJECTION SUBCUTANEOUS at 17:23

## 2020-10-02 RX ADMIN — Medication 10: at 12:17

## 2020-10-02 RX ADMIN — Medication 6 MILLIGRAM(S): at 21:57

## 2020-10-02 RX ADMIN — POLYETHYLENE GLYCOL 3350 17 GRAM(S): 17 POWDER, FOR SOLUTION ORAL at 12:19

## 2020-10-02 RX ADMIN — ATORVASTATIN CALCIUM 20 MILLIGRAM(S): 80 TABLET, FILM COATED ORAL at 21:57

## 2020-10-02 RX ADMIN — QUETIAPINE FUMARATE 400 MILLIGRAM(S): 200 TABLET, FILM COATED ORAL at 21:57

## 2020-10-02 RX ADMIN — ENOXAPARIN SODIUM 40 MILLIGRAM(S): 100 INJECTION SUBCUTANEOUS at 05:07

## 2020-10-02 RX ADMIN — Medication 6: at 17:22

## 2020-10-02 RX ADMIN — Medication 2.5 MICROGRAM(S)/KG/MIN: at 18:49

## 2020-10-02 RX ADMIN — Medication 4: at 21:55

## 2020-10-02 RX ADMIN — Medication 1000 UNIT(S): at 14:10

## 2020-10-02 RX ADMIN — Medication 325 MILLIGRAM(S): at 12:19

## 2020-10-02 RX ADMIN — Medication 2: at 07:01

## 2020-10-02 RX ADMIN — Medication 5 MILLIGRAM(S): at 05:08

## 2020-10-02 NOTE — CONSULT NOTE ADULT - ATTENDING COMMENTS
Initial attending contact date 10/2/20 . See fellow note written above for details. I reviewed the fellow documentation. I have personally seen and examined this patient. I reviewed vitals, labs, medications, cardiac studies, and additional imaging. I agree with the above fellow's findings and plans as written above with the following additions/statements.    -Consulted for sinus bradycardia @ 40s on tele  -Telemetry reviewed: Sinus luis 35-50, avg HR 40s, bradycardia more pronounced at night during sleep. Supposedly with SVT 2 days ago, but not recorded  -Pt remains asymptomatic with stable BP  -fu TFTs, meds unlikely cause of bradycardia (QTc within normal) Would consider sleep study as outpatient- r/o HELADIO as HELADIO might contribute to bradycardia  -BP mildly hypertensive- , would not treat HTN as it is compensatory to low HR  -Atropine at bedside if symptomatic   -Will continue to follow

## 2020-10-02 NOTE — PROGRESS NOTE ADULT - ASSESSMENT
65F w PMHx bipolar disorder, PD, HLD, T2DM, anemia, multiple UTIs on chronic nitrofurantoin, hypothyroidism presenting from nursing home with cough and fever, admitted to [facility] for management of sepsis 2/2 COVID19 PNA vs bacterial PNA.    65F w PMHx bipolar disorder, HLD, T2DM, anemia, multiple UTIs on chronic nitrofurantoin, hypothyroidism presenting from nursing home with cough and fever, admitted to [facility] for management of sepsis 2/2 COVID19 PNA vs bacterial PNA.

## 2020-10-02 NOTE — PROGRESS NOTE ADULT - PROBLEM SELECTOR PLAN 5
-Aripiprazole 10mg q24h  -depakote 1000 mg PO qhs  -quetiapine 400 mg PO qhs  -paroxetine 30mg PO q24h  -monitor for QTc prolongation, extrapyramidal side effects. QTc 416 on admission EKG Pt known to have hypothyroidism. Observed to be bradycardic on 10/1 with mild chest discomfort.  - Repeat TSH, T4  - c/w liothyronine 25mcg (home med)

## 2020-10-02 NOTE — PROGRESS NOTE ADULT - PROBLEM SELECTOR PLAN 1
sepsis now resolved- pt presented to ED with severe sepsis: lactate 3 (resolved to 0.9 after 500cc bolus), T 101.4, HR 93, RR 24. CXR showing b/l patchy infiltrates. Pt COVID+ x2 with CRP 7.57, however procalcitonin 0.32. PNA likely 2/2 COVID vs. bacterial PNA. Received Tylenol 1g, 500cc NS, Vancomycin 1750mg, Zosyn 3.375g.   -sepsis now resolved, currently admitted to 3[facility] and being treated for COVID  -lactate resolved from 3 to 0.9  -will not continue Vancomycin and Zosyn at this time, consider restarting as needed  -f/u blood cultures from 9/29- no growth to date Pt presented with dyspnea and was found to be hypoxemic requiring NRB supplemental oxygen. Since 10/1, she has been on NRB 10L with SpO2 92-94% and denies any worsening of her dyspnea/cough today 10/2.  - SpO2 noted to decrease to 85% on 10L NRB  - NRB increased to 15L with improvement of SpO2 to 91-94%  - c/w 15L NRB, wean back to 10L as tolerated  - Consider HFNC if NRB does not continue to keep SpO2 >88-90%

## 2020-10-02 NOTE — PROGRESS NOTE ADULT - PROBLEM SELECTOR PLAN 9
[RESOLVED] pt presented to ED with severe sepsis: lactate 3 (resolved to 0.9 after 500cc bolus), T 101.4, HR 93, RR 24. CXR showing b/l patchy infiltrates. Pt COVID+ x2 with CRP 7.57, however procalcitonin 0.32. PNA likely 2/2 COVID vs. bacterial PNA. Received Tylenol 1g, 500cc NS, Vancomycin 1750mg, Zosyn 3.375g.   -sepsis now resolved, currently admitted to [facility] and being treated for COVID  -lactate resolved from 3 to 0.9  -will not continue Vancomycin and Zosyn at this time, consider restarting as needed  -f/u blood cultures from 9/29- no growth to date

## 2020-10-02 NOTE — PROCEDURE NOTE - NSICDXPROCEDURE_GEN_ALL_CORE_FT
PROCEDURES:  Insertion, arterial line, percutaneous 02-Oct-2020 20:17:54  Eulalia Mccall  Arterial puncture 02-Oct-2020 20:17:43  Eulalia Mccall

## 2020-10-02 NOTE — CONSULT NOTE ADULT - SUBJECTIVE AND OBJECTIVE BOX
Electrophysiology Consult Note:     CHIEF COMPLAINT:  Patient is a 65y old  Female who presents with a chief complaint of shortness of breath (02 Oct 2020 08:59)        HISTORY OF PRESENT ILLNESS:   HPI:  65 F w PMHx BPD, ?PD, HLD, T2DM, anemia, multiple UTIs on chronic nitrofurantoin, hypothyroidism presenting from nursing home with cough and reported fever of 104F, with CXR at nursing home suspicious for RUL infiltrate. Patient is   positive for COVID19.  EPS called to evaluate for sinus bradycardia with HR 30 -40 BPM       PAST MEDICAL & SURGICAL HISTORY:  Bipolar disorder    Hypothyroid    T2DM (type 2 diabetes mellitus)        FAMILY HISTORY:      SOCIAL HISTORY:    [x ] Non-smoker      Allergies    No Known Allergies    Intolerances    	    MEDICATIONS:  MEDICATIONS  (STANDING):  ARIPiprazole 10 milliGRAM(s) Oral daily  atorvastatin 20 milliGRAM(s) Oral at bedtime  cholecalciferol 1000 Unit(s) Oral daily  dexAMETHasone  Injectable 6 milliGRAM(s) IV Push every 24 hours  dextrose 5%. 1000 milliLiter(s) (50 mL/Hr) IV Continuous <Continuous>  dextrose 50% Injectable 12.5 Gram(s) IV Push once  dextrose 50% Injectable 25 Gram(s) IV Push once  dextrose 50% Injectable 25 Gram(s) IV Push once  diVALproex ER 1000 milliGRAM(s) Oral at bedtime  enoxaparin Injectable 40 milliGRAM(s) SubCutaneous every 12 hours  ferrous    sulfate 325 milliGRAM(s) Oral daily  influenza  Vaccine (HIGH DOSE) 0.7 milliLiter(s) IntraMuscular once  insulin lispro (HumaLOG) corrective regimen sliding scale   SubCutaneous Before meals and at bedtime  liothyronine 25 MICROGram(s) Oral daily  PARoxetine 30 milliGRAM(s) Oral daily  polyethylene glycol 3350 17 Gram(s) Oral every 24 hours  QUEtiapine 400 milliGRAM(s) Oral at bedtime  remdesivir  IVPB   IV Intermittent   remdesivir  IVPB 100 milliGRAM(s) IV Intermittent every 24 hours    MEDICATIONS  (PRN):  benzocaine 15 mG/menthol 3.6 mG (Sugar-Free) Lozenge 1 Lozenge Oral two times a day PRN Sore Throat  dextrose 40% Gel 15 Gram(s) Oral once PRN Blood Glucose LESS THAN 70 milliGRAM(s)/deciliter  glucagon  Injectable 1 milliGRAM(s) IntraMuscular once PRN Glucose LESS THAN 70 milligrams/deciliter        REVIEW OF SYSTEMS:  CONSTITUTIONAL: No fever, weight loss, or fatigue  EYES: No eye pain, visual disturbances, or discharge  ENMT:  No difficulty hearing, tinnitus, vertigo; No sinus or throat pain  NECK: No pain or stiffness  BREASTS: No pain, masses, or nipple discharge  RESPIRATORY: No cough, wheezing, chills or hemoptysis; No Shortness of Breath  CARDIOVASCULAR: No chest pain, palpitations, dizziness, or leg swelling  GASTROINTESTINAL: No abdominal or epigastric pain. No nausea, vomiting, or hematemesis; No diarrhea or constipation. No melena or hematochezia.  GENITOURINARY: No dysuria, frequency, hematuria, or incontinence  NEUROLOGICAL: No headaches, memory loss, loss of strength, numbness, or tremors  SKIN: No itching, burning, rashes, or lesions   LYMPH Nodes: No enlarged glands  ENDOCRINE: No heat or cold intolerance; No hair loss  MUSCULOSKELETAL: No joint pain or swelling; No muscle, back, or extremity pain  PSYCHIATRIC: No depression, anxiety, mood swings, or difficulty sleeping  HEME/LYMPH: No easy bruising, or bleeding gums  ALLERY AND IMMUNOLOGIC: No hives or eczema	      PHYSICAL EXAM:  Vital Signs Last 24 Hrs  T(C): 35.8 (02 Oct 2020 14:12), Max: 36.9 (02 Oct 2020 09:05)  T(F): 96.4 (02 Oct 2020 14:12), Max: 98.4 (02 Oct 2020 09:05)  HR: 33 (02 Oct 2020 13:00) (33 - 46)  BP: 153/72 (02 Oct 2020 13:00) (110/55 - 153/72)  BP(mean): --  RR: 25 (02 Oct 2020 13:00) (18 - 25)  SpO2: 93% (02 Oct 2020 13:00) (90% - 94%)  Daily     Daily       CVS: S1 S2, , No edema  Pulm: decrreased BS  GI:  Soft, Non-tender, + BS	  Ext: No LE edema  Neurologic: A&O x 3  Skin: No rash or lesion       	  LABS:	                         11.6   5.24  )-----------( 134      ( 02 Oct 2020 06:56 )             37.5     10-02    143  |  105  |  32<H>  ----------------------------<  195<H>  4.5   |  26  |  0.73    Ca    9.4      02 Oct 2020 06:56  Phos  4.1     10-02  Mg     2.5     10-02    TPro  7.1  /  Alb  3.3  /  TBili  0.2  /  DBili  x   /  AST  12  /  ALT  9<L>  /  AlkPhos  65  10-02    proBNP:   Lipid Profile:   HgA1c:   TSH: Thyroid Stimulating Hormone, Serum: 0.099 uIU/mL (10-02 @ 06:56)  	      EKG: < from: 12 Lead ECG (09.29.20 @ 17:10) >  Ventricular Rate 88 BPM    Atrial Rate 88 BPM    P-R Interval 166 ms    QRS Duration 76 ms    Q-T Interval 344 ms    QTC Calculation(Bazett) 416 ms    P Axis 58 degrees    R Axis 64 degrees    T Axis 45 degrees    Diagnosis Line Normal sinus rhythm      Telemetry: sinus bradycardia HR 30-40 BPM    Echo: < from: TTE Echo Complete w/o Contrast w/ Doppler (06.30.20 @ 14:22) >  1. Limitedstudy obtained for evaluation of left ventricular function.   2. There is trace tricuspid regurgitation. Pulmonary artery systolic pressure (estimated using the tricuspid regurgitant gradient and an estimate of right atrial pressure) is atleast 29 mmHg ( IVC not visualized on this study).   3. Right ventricular systolic function is probably normal.   4. There is a septal "knuckle" with no evidence of left ventriclular outflow obstruction. The left ventricle is normal in size and systolic functionwith a calculated ejection fraction of 60%.   5. No pericardial effusion.

## 2020-10-02 NOTE — PROGRESS NOTE ADULT - PROBLEM SELECTOR PLAN 9
F: none  E: replete PRN  N: consistent carb, kosher diet  GI ppx: none  DVT ppx: lovenox 40 mg subQ q12h  CODE: full  Dispo: 3wo [RESOLVED] pt presented to ED with severe sepsis: lactate 3 (resolved to 0.9 after 500cc bolus), T 101.4, HR 93, RR 24. CXR showing b/l patchy infiltrates. Pt COVID+ x2 with CRP 7.57, however procalcitonin 0.32. PNA likely 2/2 COVID vs. bacterial PNA. Received Tylenol 1g, 500cc NS, Vancomycin 1750mg, Zosyn 3.375g.   -sepsis now resolved, currently admitted to 3[facility] and being treated for COVID  -lactate resolved from 3 to 0.9  -will not continue Vancomycin and Zosyn at this time, consider restarting as needed  -f/u blood cultures from 9/29- no growth to date

## 2020-10-02 NOTE — PROGRESS NOTE ADULT - SUBJECTIVE AND OBJECTIVE BOX
[facility] Acceptance Note- [facility] - [facility] MICU ([id number])  HOSPITAL COURSE:    SUBJECTIVE/OVERNIGHT EVENTS: Pt seen in PM at bedside, resting comfortably in bed, and does not appear to be in any acute distress. When asked, pt denies any recent or active fever, chills, nausea, vomiting, headache, acute sob, chest pain, abdominal pain, genitourinary sx, extremity pain or swelling.    VITAL SIGNS:  Vital Signs Last 24 Hrs  T(C): 37.1 (02 Oct 2020 18:13), Max: 37.1 (02 Oct 2020 18:13)  T(F): 98.7 (02 Oct 2020 18:13), Max: 98.7 (02 Oct 2020 18:13)  HR: 45 (02 Oct 2020 18:25) (31 - 46)  BP: 142/90 (02 Oct 2020 17:49) (110/55 - 153/72)  BP(mean): --  RR: 26 (02 Oct 2020 18:25) (18 - 26)  SpO2: 95% (02 Oct 2020 18:25) (90% - 95%)    PHYSICAL EXAM:  General: NAD; speaking in full sentences  HEENT: NC/AT; PERRL; EOMI; MMM  Neck: supple; no JVD  Cardiac: RRR; +S1/S2  Pulm: CTA B/L; no W/R/R  GI: soft, NT/ND, +BS  Extremities: WWP; no edema, clubbing or cyanosis  Vasc: 2+ radial, DP pulses B/L  Neuro: AAOx3; no focal deficits    MEDICATIONS:  MEDICATIONS  (STANDING):  ARIPiprazole 10 milliGRAM(s) Oral daily  atorvastatin 20 milliGRAM(s) Oral at bedtime  cholecalciferol 1000 Unit(s) Oral daily  dexAMETHasone  Injectable 6 milliGRAM(s) IV Push every 24 hours  dextrose 5%. 1000 milliLiter(s) (50 mL/Hr) IV Continuous <Continuous>  dextrose 50% Injectable 12.5 Gram(s) IV Push once  dextrose 50% Injectable 25 Gram(s) IV Push once  dextrose 50% Injectable 25 Gram(s) IV Push once  diVALproex ER 1000 milliGRAM(s) Oral at bedtime  DOBUTamine Infusion 1.475 MICROgram(s)/kG/Min (2.5 mL/Hr) IV Continuous <Continuous>  enoxaparin Injectable 40 milliGRAM(s) SubCutaneous every 12 hours  ferrous    sulfate 325 milliGRAM(s) Oral daily  influenza  Vaccine (HIGH DOSE) 0.7 milliLiter(s) IntraMuscular once  insulin lispro (HumaLOG) corrective regimen sliding scale   SubCutaneous Before meals and at bedtime  liothyronine 25 MICROGram(s) Oral daily  PARoxetine 30 milliGRAM(s) Oral daily  polyethylene glycol 3350 17 Gram(s) Oral every 24 hours  QUEtiapine 400 milliGRAM(s) Oral at bedtime  remdesivir  IVPB   IV Intermittent   remdesivir  IVPB 100 milliGRAM(s) IV Intermittent every 24 hours    MEDICATIONS  (PRN):  benzocaine 15 mG/menthol 3.6 mG (Sugar-Free) Lozenge 1 Lozenge Oral two times a day PRN Sore Throat  dextrose 40% Gel 15 Gram(s) Oral once PRN Blood Glucose LESS THAN 70 milliGRAM(s)/deciliter  glucagon  Injectable 1 milliGRAM(s) IntraMuscular once PRN Glucose LESS THAN 70 milligrams/deciliter      ALLERGIES:  Allergies    No Known Allergies    Intolerances        LABS:                        11.6   5.24  )-----------( 134      ( 02 Oct 2020 06:56 )             37.5     10-02    143  |  105  |  32<H>  ----------------------------<  195<H>  4.5   |  26  |  0.73    Ca    9.4      02 Oct 2020 06:56  Phos  4.1     10-02  Mg     2.5     10-02    TPro  7.1  /  Alb  3.3  /  TBili  0.2  /  DBili  x   /  AST  12  /  ALT  9<L>  /  AlkPhos  65  10-02        RADIOLOGY & ADDITIONAL TESTS: Reviewed. Transfer Acceptance Note- [facility] Floors Unit - [facility] MICU ([id number])    HOSPITAL COURSE: Patient is a 65 F with PMH BPD, DM, recurrent UTIs on nitrofurantoin, hypothyroidism, who presented from NH with reported Fever to 104, cough and chills, found to have Hypoxic Respiratory Failure 2/2 COVID PNA for which she was started on NRB, Antiviral, Decadron and Broad Spectrum ABx. Hospital course notable for New onset Bradycardia, different from admission for which cardiology and CCU were consulted, however both in agreement that bradyarrhythmia was not pathological. EPS also called to evaluate for sinus bradycardia with HR 30 -40 BPM. Per EPS, bradycardia could be secondary to hypoxia and underlying infection, and therefore recommended holding off on transvenous pacing at this time. Also recommended starting Dobutamine 2.5 with goal to slowly uptitrate to 5 and monitor HR response. Pt stepped up to 3W COVID MICU for closer monitoring where arterial line and huitron were placed.    SUBJECTIVE/OVERNIGHT EVENTS: Pt seen in PM at bedside, resting comfortably in bed, and does not appear to be in any acute distress. When asked, pt denies any recent or active fever, chills, nausea, vomiting, headache, acute sob, chest pain, abdominal pain, genitourinary sx, extremity pain or swelling.    VITAL SIGNS:  Vital Signs Last 24 Hrs  T(C): 37.1 (02 Oct 2020 18:13), Max: 37.1 (02 Oct 2020 18:13)  T(F): 98.7 (02 Oct 2020 18:13), Max: 98.7 (02 Oct 2020 18:13)  HR: 45 (02 Oct 2020 18:25) (31 - 46)  BP: 142/90 (02 Oct 2020 17:49) (110/55 - 153/72)  BP(mean): --  RR: 26 (02 Oct 2020 18:25) (18 - 26)  SpO2: 95% (02 Oct 2020 18:25) (90% - 95%)    PHYSICAL EXAM:  General: NAD; speaking in full sentences  HEENT: NC/AT; PERRL; EOMI; MMM  Neck: supple; no JVD  Cardiac: RRR; +S1/S2  Pulm: CTA B/L; no W/R/R  GI: soft, NT/ND, +BS  Extremities: WWP; no edema, clubbing or cyanosis  Vasc: 2+ radial, DP pulses B/L  Neuro: AAOx3; no focal deficits    MEDICATIONS:  MEDICATIONS  (STANDING):  ARIPiprazole 10 milliGRAM(s) Oral daily  atorvastatin 20 milliGRAM(s) Oral at bedtime  cholecalciferol 1000 Unit(s) Oral daily  dexAMETHasone  Injectable 6 milliGRAM(s) IV Push every 24 hours  dextrose 5%. 1000 milliLiter(s) (50 mL/Hr) IV Continuous <Continuous>  dextrose 50% Injectable 12.5 Gram(s) IV Push once  dextrose 50% Injectable 25 Gram(s) IV Push once  dextrose 50% Injectable 25 Gram(s) IV Push once  diVALproex ER 1000 milliGRAM(s) Oral at bedtime  DOBUTamine Infusion 1.475 MICROgram(s)/kG/Min (2.5 mL/Hr) IV Continuous <Continuous>  enoxaparin Injectable 40 milliGRAM(s) SubCutaneous every 12 hours  ferrous    sulfate 325 milliGRAM(s) Oral daily  influenza  Vaccine (HIGH DOSE) 0.7 milliLiter(s) IntraMuscular once  insulin lispro (HumaLOG) corrective regimen sliding scale   SubCutaneous Before meals and at bedtime  liothyronine 25 MICROGram(s) Oral daily  PARoxetine 30 milliGRAM(s) Oral daily  polyethylene glycol 3350 17 Gram(s) Oral every 24 hours  QUEtiapine 400 milliGRAM(s) Oral at bedtime  remdesivir  IVPB   IV Intermittent   remdesivir  IVPB 100 milliGRAM(s) IV Intermittent every 24 hours    MEDICATIONS  (PRN):  benzocaine 15 mG/menthol 3.6 mG (Sugar-Free) Lozenge 1 Lozenge Oral two times a day PRN Sore Throat  dextrose 40% Gel 15 Gram(s) Oral once PRN Blood Glucose LESS THAN 70 milliGRAM(s)/deciliter  glucagon  Injectable 1 milliGRAM(s) IntraMuscular once PRN Glucose LESS THAN 70 milligrams/deciliter      ALLERGIES:  Allergies    No Known Allergies    Intolerances        LABS:                        11.6   5.24  )-----------( 134      ( 02 Oct 2020 06:56 )             37.5     10-02    143  |  105  |  32<H>  ----------------------------<  195<H>  4.5   |  26  |  0.73    Ca    9.4      02 Oct 2020 06:56  Phos  4.1     10-02  Mg     2.5     10-02    TPro  7.1  /  Alb  3.3  /  TBili  0.2  /  DBili  x   /  AST  12  /  ALT  9<L>  /  AlkPhos  65  10-02        RADIOLOGY & ADDITIONAL TESTS: Reviewed. Transfer Acceptance Note- [facility] Floors Unit - [facility] MICU ([id number])    HOSPITAL COURSE: Patient is a 65 F with PMH BPD, DM, recurrent UTIs on nitrofurantoin, hypothyroidism, who presented from NH with reported Fever to 104, cough and chills, found to have Hypoxic Respiratory Failure 2/2 COVID PNA for which she was started on NRB, Antiviral, Decadron and Broad Spectrum ABx. Hospital course notable for New onset Bradycardia, different from admission for which cardiology and CCU were consulted, however both in agreement that bradyarrhythmia was not pathological. EPS also called to evaluate for sinus bradycardia with HR 30 -40 BPM. Per EPS, bradycardia could be secondary to hypoxia and underlying infection, and therefore recommended holding off on transvenous pacing at this time. Also recommended starting Dobutamine 2.5 with goal to slowly uptitrate to 5 and monitor HR response. [facility] monitoring where arterial line and huitron were placed.    SUBJECTIVE/OVERNIGHT EVENTS: Pt seen in PM at bedside, resting comfortably in bed, and does not appear to be in any acute distress. When asked, pt denies any recent or active fever, chills, nausea, vomiting, headache, acute sob, chest pain, abdominal pain, genitourinary sx, extremity pain or swelling.    VITAL SIGNS:  Vital Signs Last 24 Hrs  T(C): 37.1 (02 Oct 2020 18:13), Max: 37.1 (02 Oct 2020 18:13)  T(F): 98.7 (02 Oct 2020 18:13), Max: 98.7 (02 Oct 2020 18:13)  HR: 45 (02 Oct 2020 18:25) (31 - 46)  BP: 142/90 (02 Oct 2020 17:49) (110/55 - 153/72)  BP(mean): --  RR: 26 (02 Oct 2020 18:25) (18 - 26)  SpO2: 95% (02 Oct 2020 18:25) (90% - 95%)    PHYSICAL EXAM:  General: NAD; speaking in full sentences  HEENT: NC/AT; PERRL; EOMI; MMM  Neck: supple; no JVD  Cardiac: +bradycardia; +S1/S2  Pulm: CTA B/L; no W/R/R  GI: soft, NT/ND, +BS  Extremities: WWP; no edema, clubbing or cyanosis  Vasc: 2+ radial, DP pulses B/L  Neuro: AAOx3; no focal deficits    MEDICATIONS:  MEDICATIONS  (STANDING):  ARIPiprazole 10 milliGRAM(s) Oral daily  atorvastatin 20 milliGRAM(s) Oral at bedtime  cholecalciferol 1000 Unit(s) Oral daily  dexAMETHasone  Injectable 6 milliGRAM(s) IV Push every 24 hours  dextrose 5%. 1000 milliLiter(s) (50 mL/Hr) IV Continuous <Continuous>  dextrose 50% Injectable 12.5 Gram(s) IV Push once  dextrose 50% Injectable 25 Gram(s) IV Push once  dextrose 50% Injectable 25 Gram(s) IV Push once  diVALproex ER 1000 milliGRAM(s) Oral at bedtime  DOBUTamine Infusion 1.475 MICROgram(s)/kG/Min (2.5 mL/Hr) IV Continuous <Continuous>  enoxaparin Injectable 40 milliGRAM(s) SubCutaneous every 12 hours  ferrous    sulfate 325 milliGRAM(s) Oral daily  influenza  Vaccine (HIGH DOSE) 0.7 milliLiter(s) IntraMuscular once  insulin lispro (HumaLOG) corrective regimen sliding scale   SubCutaneous Before meals and at bedtime  liothyronine 25 MICROGram(s) Oral daily  PARoxetine 30 milliGRAM(s) Oral daily  polyethylene glycol 3350 17 Gram(s) Oral every 24 hours  QUEtiapine 400 milliGRAM(s) Oral at bedtime  remdesivir  IVPB   IV Intermittent   remdesivir  IVPB 100 milliGRAM(s) IV Intermittent every 24 hours    MEDICATIONS  (PRN):  benzocaine 15 mG/menthol 3.6 mG (Sugar-Free) Lozenge 1 Lozenge Oral two times a day PRN Sore Throat  dextrose 40% Gel 15 Gram(s) Oral once PRN Blood Glucose LESS THAN 70 milliGRAM(s)/deciliter  glucagon  Injectable 1 milliGRAM(s) IntraMuscular once PRN Glucose LESS THAN 70 milligrams/deciliter      ALLERGIES:  Allergies    No Known Allergies    Intolerances        LABS:                        11.6   5.24  )-----------( 134      ( 02 Oct 2020 06:56 )             37.5     10-02    143  |  105  |  32<H>  ----------------------------<  195<H>  4.5   |  26  |  0.73    Ca    9.4      02 Oct 2020 06:56  Phos  4.1     10-02  Mg     2.5     10-02    TPro  7.1  /  Alb  3.3  /  TBili  0.2  /  DBili  x   /  AST  12  /  ALT  9<L>  /  AlkPhos  65  10-02        RADIOLOGY & ADDITIONAL TESTS: Reviewed.

## 2020-10-02 NOTE — PROGRESS NOTE ADULT - SUBJECTIVE AND OBJECTIVE BOX
*INCOMPLETE*    OVERNIGHT EVENTS: Bradycardia to a low of 42 overnight. EKG shows sinus bradycardia.    SUBJECTIVE / INTERVAL HPI: Patient seen and examined at bedside.     VITAL SIGNS:  Vital Signs Last 24 Hrs  T(C): 36.1 (02 Oct 2020 04:56), Max: 36.1 (01 Oct 2020 13:16)  T(F): 97 (02 Oct 2020 04:56), Max: 97 (01 Oct 2020 13:16)  HR: 42 (02 Oct 2020 04:56) (42 - 54)  BP: 110/55 (02 Oct 2020 04:56) (110/55 - 135/63)  BP(mean): --  RR: 18 (02 Oct 2020 04:56) (18 - 18)  SpO2: 94% (02 Oct 2020 04:56) (94% - 95%)    PHYSICAL EXAM:    General: WDWN  HEENT: NC/AT; PERRL, anicteric sclera; MMM  Neck: supple  Cardiovascular: +S1/S2, RRR  Respiratory: CTA B/L; no W/R/R  Gastrointestinal: soft, NT/ND; +BSx4  Extremities: WWP; no edema, clubbing or cyanosis  Vascular: 2+ radial, DP/PT pulses B/L  Neurological: AAOx3; no focal deficits    MEDICATIONS:  MEDICATIONS  (STANDING):  ARIPiprazole 10 milliGRAM(s) Oral daily  atorvastatin 20 milliGRAM(s) Oral at bedtime  cholecalciferol 1000 Unit(s) Oral daily  dexAMETHasone  Injectable 6 milliGRAM(s) IV Push every 24 hours  dextrose 5%. 1000 milliLiter(s) (50 mL/Hr) IV Continuous <Continuous>  dextrose 50% Injectable 12.5 Gram(s) IV Push once  dextrose 50% Injectable 25 Gram(s) IV Push once  dextrose 50% Injectable 25 Gram(s) IV Push once  diVALproex ER 1000 milliGRAM(s) Oral at bedtime  enoxaparin Injectable 40 milliGRAM(s) SubCutaneous every 12 hours  ferrous    sulfate 325 milliGRAM(s) Oral daily  influenza  Vaccine (HIGH DOSE) 0.7 milliLiter(s) IntraMuscular once  insulin lispro (HumaLOG) corrective regimen sliding scale   SubCutaneous Before meals and at bedtime  liothyronine 25 MICROGram(s) Oral daily  oxybutynin 5 milliGRAM(s) Oral two times a day  PARoxetine 30 milliGRAM(s) Oral daily  polyethylene glycol 3350 17 Gram(s) Oral every 24 hours  QUEtiapine 400 milliGRAM(s) Oral at bedtime  remdesivir  IVPB   IV Intermittent   remdesivir  IVPB 100 milliGRAM(s) IV Intermittent every 24 hours    MEDICATIONS  (PRN):  benzocaine 15 mG/menthol 3.6 mG (Sugar-Free) Lozenge 1 Lozenge Oral two times a day PRN Sore Throat  dextrose 40% Gel 15 Gram(s) Oral once PRN Blood Glucose LESS THAN 70 milliGRAM(s)/deciliter  glucagon  Injectable 1 milliGRAM(s) IntraMuscular once PRN Glucose LESS THAN 70 milligrams/deciliter      ALLERGIES:  Allergies    No Known Allergies    Intolerances        LABS:                        11.6   5.24  )-----------( 134      ( 02 Oct 2020 06:56 )             37.5     10-02    143  |  105  |  32<H>  ----------------------------<  195<H>  4.5   |  26  |  0.73    Ca    9.4      02 Oct 2020 06:56  Phos  4.1     10-02  Mg     2.5     10-02    TPro  7.1  /  Alb  3.3  /  TBili  0.2  /  DBili  x   /  AST  12  /  ALT  9<L>  /  AlkPhos  65  10-02        CAPILLARY BLOOD GLUCOSE      POCT Blood Glucose.: 173 mg/dL (02 Oct 2020 06:52)      RADIOLOGY & ADDITIONAL TESTS: Reviewed. *INCOMPLETE*    OVERNIGHT EVENTS: Bradycardia to a low of 42 overnight. EKG shows sinus bradycardia.    SUBJECTIVE / INTERVAL HPI: Patient seen and examined at bedside. Pt feels subjectively the same as yesterday, denies chest pain or palpitations, admits to shortness of breath c/w yesterday 10/1. Pt SpO2 seen to drop as low as 85% at bedside, increased NRB to 15L.    Oxygen Requirements: Increased to 15L NRB with SpO2 91% from 10L NRB    D-dimer trend:   CRP trend:  Ferritin trend:    VITAL SIGNS:  Vital Signs Last 24 Hrs  T(C): 36.1 (02 Oct 2020 04:56), Max: 36.1 (01 Oct 2020 13:16)  T(F): 97 (02 Oct 2020 04:56), Max: 97 (01 Oct 2020 13:16)  HR: 42 (02 Oct 2020 04:56) (42 - 54)  BP: 110/55 (02 Oct 2020 04:56) (110/55 - 135/63)  BP(mean): --  RR: 18 (02 Oct 2020 04:56) (18 - 18)  SpO2: 94% (02 Oct 2020 04:56) (94% - 95%)    PHYSICAL EXAM:    General: WDWN  HEENT: NC/AT; PERRL, anicteric sclera; MMM  Neck: supple  Cardiovascular: +S1/S2, RRR  Respiratory: CTA B/L; no W/R/R  Gastrointestinal: soft, NT/ND; +BSx4  Extremities: WWP; no edema, clubbing or cyanosis  Vascular: 2+ radial, DP/PT pulses B/L  Neurological: AAOx3; no focal deficits    MEDICATIONS:  MEDICATIONS  (STANDING):  ARIPiprazole 10 milliGRAM(s) Oral daily  atorvastatin 20 milliGRAM(s) Oral at bedtime  cholecalciferol 1000 Unit(s) Oral daily  dexAMETHasone  Injectable 6 milliGRAM(s) IV Push every 24 hours  dextrose 5%. 1000 milliLiter(s) (50 mL/Hr) IV Continuous <Continuous>  dextrose 50% Injectable 12.5 Gram(s) IV Push once  dextrose 50% Injectable 25 Gram(s) IV Push once  dextrose 50% Injectable 25 Gram(s) IV Push once  diVALproex ER 1000 milliGRAM(s) Oral at bedtime  enoxaparin Injectable 40 milliGRAM(s) SubCutaneous every 12 hours  ferrous    sulfate 325 milliGRAM(s) Oral daily  influenza  Vaccine (HIGH DOSE) 0.7 milliLiter(s) IntraMuscular once  insulin lispro (HumaLOG) corrective regimen sliding scale   SubCutaneous Before meals and at bedtime  liothyronine 25 MICROGram(s) Oral daily  oxybutynin 5 milliGRAM(s) Oral two times a day  PARoxetine 30 milliGRAM(s) Oral daily  polyethylene glycol 3350 17 Gram(s) Oral every 24 hours  QUEtiapine 400 milliGRAM(s) Oral at bedtime  remdesivir  IVPB   IV Intermittent   remdesivir  IVPB 100 milliGRAM(s) IV Intermittent every 24 hours    MEDICATIONS  (PRN):  benzocaine 15 mG/menthol 3.6 mG (Sugar-Free) Lozenge 1 Lozenge Oral two times a day PRN Sore Throat  dextrose 40% Gel 15 Gram(s) Oral once PRN Blood Glucose LESS THAN 70 milliGRAM(s)/deciliter  glucagon  Injectable 1 milliGRAM(s) IntraMuscular once PRN Glucose LESS THAN 70 milligrams/deciliter      ALLERGIES:  Allergies    No Known Allergies    Intolerances        LABS:                        11.6   5.24  )-----------( 134      ( 02 Oct 2020 06:56 )             37.5     10-02    143  |  105  |  32<H>  ----------------------------<  195<H>  4.5   |  26  |  0.73    Ca    9.4      02 Oct 2020 06:56  Phos  4.1     10-02  Mg     2.5     10-02    TPro  7.1  /  Alb  3.3  /  TBili  0.2  /  DBili  x   /  AST  12  /  ALT  9<L>  /  AlkPhos  65  10-02        CAPILLARY BLOOD GLUCOSE      POCT Blood Glucose.: 173 mg/dL (02 Oct 2020 06:52)      RADIOLOGY & ADDITIONAL TESTS: Reviewed. OVERNIGHT EVENTS: Bradycardia to a low of 42 overnight. EKG shows sinus bradycardia.    SUBJECTIVE / INTERVAL HPI: Patient seen and examined at bedside. Pt feels subjectively the same as yesterday, denies chest pain or palpitations, admits to shortness of breath c/w yesterday 10/1. Pt SpO2 seen to drop as low as 85% at bedside, increased NRB to 15L.    Oxygen Requirements: Increased to 15L NRB with SpO2 91% from 10L NRB    D-dimer trend: <150 to 283  CRP trend: 5.72 to 2.95  Ferritin trend: 232 to 244    VITAL SIGNS:  Vital Signs Last 24 Hrs  T(C): 36.1 (02 Oct 2020 04:56), Max: 36.1 (01 Oct 2020 13:16)  T(F): 97 (02 Oct 2020 04:56), Max: 97 (01 Oct 2020 13:16)  HR: 42 (02 Oct 2020 04:56) (42 - 54)  BP: 110/55 (02 Oct 2020 04:56) (110/55 - 135/63)  BP(mean): --  RR: 18 (02 Oct 2020 04:56) (18 - 18)  SpO2: 94% (02 Oct 2020 04:56) (94% - 95%)    PHYSICAL EXAM:    General: WDWN  HEENT: NC/AT; PERRL, anicteric sclera; MMM  Neck: supple  Cardiovascular: +S1/S2, RRR  Respiratory: CTA B/L; no W/R/R  Gastrointestinal: soft, NT/ND; +BSx4  Extremities: WWP; no edema, clubbing or cyanosis  Vascular: 2+ radial, DP/PT pulses B/L  Neurological: AAOx3; no focal deficits    MEDICATIONS:  MEDICATIONS  (STANDING):  ARIPiprazole 10 milliGRAM(s) Oral daily  atorvastatin 20 milliGRAM(s) Oral at bedtime  cholecalciferol 1000 Unit(s) Oral daily  dexAMETHasone  Injectable 6 milliGRAM(s) IV Push every 24 hours  dextrose 5%. 1000 milliLiter(s) (50 mL/Hr) IV Continuous <Continuous>  dextrose 50% Injectable 12.5 Gram(s) IV Push once  dextrose 50% Injectable 25 Gram(s) IV Push once  dextrose 50% Injectable 25 Gram(s) IV Push once  diVALproex ER 1000 milliGRAM(s) Oral at bedtime  enoxaparin Injectable 40 milliGRAM(s) SubCutaneous every 12 hours  ferrous    sulfate 325 milliGRAM(s) Oral daily  influenza  Vaccine (HIGH DOSE) 0.7 milliLiter(s) IntraMuscular once  insulin lispro (HumaLOG) corrective regimen sliding scale   SubCutaneous Before meals and at bedtime  liothyronine 25 MICROGram(s) Oral daily  oxybutynin 5 milliGRAM(s) Oral two times a day  PARoxetine 30 milliGRAM(s) Oral daily  polyethylene glycol 3350 17 Gram(s) Oral every 24 hours  QUEtiapine 400 milliGRAM(s) Oral at bedtime  remdesivir  IVPB   IV Intermittent   remdesivir  IVPB 100 milliGRAM(s) IV Intermittent every 24 hours    MEDICATIONS  (PRN):  benzocaine 15 mG/menthol 3.6 mG (Sugar-Free) Lozenge 1 Lozenge Oral two times a day PRN Sore Throat  dextrose 40% Gel 15 Gram(s) Oral once PRN Blood Glucose LESS THAN 70 milliGRAM(s)/deciliter  glucagon  Injectable 1 milliGRAM(s) IntraMuscular once PRN Glucose LESS THAN 70 milligrams/deciliter      ALLERGIES:  Allergies    No Known Allergies    Intolerances        LABS:                        11.6   5.24  )-----------( 134      ( 02 Oct 2020 06:56 )             37.5     10-02    143  |  105  |  32<H>  ----------------------------<  195<H>  4.5   |  26  |  0.73    Ca    9.4      02 Oct 2020 06:56  Phos  4.1     10-02  Mg     2.5     10-02    TPro  7.1  /  Alb  3.3  /  TBili  0.2  /  DBili  x   /  AST  12  /  ALT  9<L>  /  AlkPhos  65  10-02        CAPILLARY BLOOD GLUCOSE      POCT Blood Glucose.: 173 mg/dL (02 Oct 2020 06:52)      RADIOLOGY & ADDITIONAL TESTS: Reviewed.

## 2020-10-02 NOTE — PROGRESS NOTE ADULT - PROBLEM SELECTOR PLAN 2
Pt COVID postive x2,  presenting with fever of 101.4F in ED on 9/29  -contact/droplet isolation precautions  -now breathing on NRB mask 10 L/min and satturating in low 90%s- will try to wean to 6 L/min, and assess O2 saturation while out of bed.   -on remdesivir 200 mg x1, then 100 mg x 4 days (last day 10/3 @10 PM)  -continue decadron IV 6mg daily (first dose 9/30 12 AM)  -f/u daily COVID labs- CBC w/ diff, CMP, ferritin, CRP, D-dimer, Mg, Phos  -pt. reported chest pain on 10/1 AM- f/u EKG Pt observed to have HR <50 overnight 9/30 and 10/1. Reported chest discomfort earlier on 10/1 but currently denies chest pain or palpitations. Denies prior history of bradycardia.  - EKG shows sinus bradycardia  - Dr. Catalan to evaluate patient  - f/u TSH  - f/u CXR Pt observed to have HR <50 overnight 9/30 and 10/1. Reported chest discomfort earlier on 10/1 but currently denies chest pain or palpitations. Denies prior history of bradycardia.  - EKG shows sinus bradycardia  - Cardiology consulted (Dr. Catalan) and recommends no acute interventions at this time  - Atropine at bedside in case of symptomatic bradycardia  - Per cardiology, consider dopamine gtt if pt requires atropine (although they stipulate that they do not anticipate need for this)  - f/u TSH  - f/u CXR

## 2020-10-02 NOTE — CONSULT NOTE ADULT - SUBJECTIVE AND OBJECTIVE BOX
CHIEF COMPLAINT:    HPI:  65F w PMHx BPD, ?PD, HLD, T2DM, anemia, multiple UTIs on chronic nitrofurantoin, hypothyroidism presenting from nursing home with cough and reported fever of 104F, with CXR at nursing home suspicious for RUL infiltrate. Of note, pt also tested positive for COVID19. Pt endorses intermittent nonproductive cough, denies difficulty breathing, chest tightness, CP, abdominal pain, N/V/D/C, dysuria.    65F PMH BPD, DM, recurrent UTIs on nitrofurantoin, hypothyroidism, p/w cough/fever in setting of COVID. Telemetry with short runs of SVT 2d ago, and now with bradycardia to 40s. HR trend lower during sleep but also 40s during waketime. TSH .14    ED Vitals: T 101.4, HR 93, /57, RR 24 SpO2 93% on 2LNC  Labs significant for Lactate 3 (resolved to 0.9), Procalcitonin 0.32, CRP mildly elevated 7.57, no leukocytosis  CXR: patchy b/l infiltrates  EKG: normal sinus  Received 1g tylenol, 500cc NS bolus,  1750mg Vancomycin, 3.375g Zosyn    Pt admitted to [facility] for sepsis 2/2 PNA secondary to COVID-19 vs bacterial PNA (29 Sep 2020 20:10)    PAST MEDICAL & SURGICAL HISTORY:  Bipolar disorder    Hypothyroid    T2DM (type 2 diabetes mellitus)    ALLERGIES/INTOLERANCES:  No Known Allergies    HOME MEDICATIONS:    INPATIENT MEDICATIONS:    enoxaparin Injectable 40 milliGRAM(s) SubCutaneous every 12 hours    ARIPiprazole 10 milliGRAM(s) Oral daily  atorvastatin 20 milliGRAM(s) Oral at bedtime  benzocaine 15 mG/menthol 3.6 mG (Sugar-Free) Lozenge 1 Lozenge Oral two times a day PRN  cholecalciferol 1000 Unit(s) Oral daily  dexAMETHasone  Injectable 6 milliGRAM(s) IV Push every 24 hours  dextrose 40% Gel 15 Gram(s) Oral once PRN  dextrose 5%. 1000 milliLiter(s) IV Continuous <Continuous>  dextrose 50% Injectable 12.5 Gram(s) IV Push once  dextrose 50% Injectable 25 Gram(s) IV Push once  dextrose 50% Injectable 25 Gram(s) IV Push once  diVALproex ER 1000 milliGRAM(s) Oral at bedtime  ferrous    sulfate 325 milliGRAM(s) Oral daily  glucagon  Injectable 1 milliGRAM(s) IntraMuscular once PRN  influenza  Vaccine (HIGH DOSE) 0.7 milliLiter(s) IntraMuscular once  insulin lispro (HumaLOG) corrective regimen sliding scale   SubCutaneous Before meals and at bedtime  liothyronine 25 MICROGram(s) Oral daily  oxybutynin 5 milliGRAM(s) Oral two times a day  PARoxetine 30 milliGRAM(s) Oral daily  polyethylene glycol 3350 17 Gram(s) Oral every 24 hours  QUEtiapine 400 milliGRAM(s) Oral at bedtime  remdesivir  IVPB   IV Intermittent   remdesivir  IVPB 100 milliGRAM(s) IV Intermittent every 24 hours      REVIEW OF SYSTEMS:    CONSTITUTIONAL: No weakness, F/C, wt loss/gain  EYES: No visual changes/disturbances  ENMT: No dry mouth, no vertigo  NECK: No pain or stiffness  RESPIRATORY: No cough, wheezing, hemoptysis; No shortness of breath  CARDIOVASCULAR: No chest pain, palpitations, lightheadedness/dizziness, LOC, or leg swelling  GASTROINTESTINAL: No abdominal or epigastric pain. No N/V/D/C. No melena, hematochezia, or hematemesis.  GENITOURINARY: No dysuria, increased frequency, hematuria, or incontinence  NEUROLOGICAL: No lightheadedness/dizziness, LOC, headaches, numbness or weakness  MUSCULOSKELETAL: No joint pain or swelling; No muscle, back, or extremity pain  SKIN: No itching, burning, rashes, or lesions   ENDOCRINE: No heat or cold intolerance; No hair loss  HEME/LYMPH: No easy bruising, or bleeding gums  PSYCHIATRIC: No depression, anxiety, mood swings, or difficulty sleeping    [ ] All other review of systems are negative unless indicated above.  [ ] Unable to obtain due to:    PHYSICAL EXAM:    T(C): 36.1 (10-02-20 @ 04:56), Max: 36.1 (10-01-20 @ 13:16)  HR: 42 (10-02-20 @ 04:56) (42 - 54)  BP: 110/55 (10-02-20 @ 04:56) (110/55 - 135/63)  RR: 18 (10-02-20 @ 04:56) (18 - 18)  SpO2: 94% (10-02-20 @ 04:56) (94% - 95%)  Wt(kg): --    I&O's Summary  GENERAL: NAD, well-developed  HEAD:  NCAT  HEENT: EOMI, PERRL, conjunctiva and sclera clear; moist mucosa; Neck supple, No JVD  CARDIOVASCULAR: RRR, normal S1 S2, no M/R/G, no JVD, neg HJR, nondisplaced PMI, no LE edema  RESPIRATORY: Lungs clear to auscultation b/l, no C/W/R  GASTROINTESTINAL: +BS, soft, non-distended, non-tender, no HSM  VASCULAR: Peripheral pulses palpable 2+ bilaterally  EXTREMITIES: Warm. No clubbing, cyanosis or edema. Normal range of motion.  SKIN: No rashes, lesions, ecchymoses, or cyanosis  NEURO: AAOx3, no focal deficits  PSYCH: Nl behavior, nl affect  LINES:    TELEMETRY: 	      ECG:  	  	  LABS:                        11.6   5.24  )-----------( 134      ( 02 Oct 2020 06:56 )             37.5     10-02    143  |  105  |  32<H>  ----------------------------<  195<H>  4.5   |  26  |  0.73    Ca    9.4      02 Oct 2020 06:56  Phos  4.1     10-02  Mg     2.5     10-02    TPro  7.1  /  Alb  3.3  /  TBili  0.2  /  DBili  x   /  AST  12  /  ALT  9<L>  /  AlkPhos  65  10-02      Lipid Profile:   HgA1c:   TSH:     CARDIAC MARKERS:          proBNP:     RADIOLOGY:      ASSESSMENT/PLAN: 	     CHIEF COMPLAINT:    HPI:  65F w PMHx BPD, ?PD, HLD, T2DM, anemia, multiple UTIs on chronic nitrofurantoin, hypothyroidism presenting from nursing home with cough and reported fever of 104F, with CXR at nursing home suspicious for RUL infiltrate. Of note, pt also tested positive for COVID19. Pt endorses intermittent nonproductive cough, denies difficulty breathing, chest tightness, CP, abdominal pain, N/V/D/C, dysuria.    65F PMH BPD, DM, recurrent UTIs on nitrofurantoin, hypothyroidism, p/w cough/fever in setting of COVID. Telemetry with short runs of SVT 2d ago, and now with bradycardia to 40s. HR trend lower during sleep but also 40s during waketime. TSH .14    ED Vitals: T 101.4, HR 93, /57, RR 24 SpO2 93% on 2LNC  Labs significant for Lactate 3 (resolved to 0.9), Procalcitonin 0.32, CRP mildly elevated 7.57, no leukocytosis  CXR: patchy b/l infiltrates  EKG: normal sinus  Received 1g tylenol, 500cc NS bolus,  1750mg Vancomycin, 3.375g Zosyn    Pt admitted to [facility] for sepsis 2/2 PNA secondary to COVID-19 vs bacterial PNA (29 Sep 2020 20:10)    PAST MEDICAL & SURGICAL HISTORY:  Bipolar disorder    Hypothyroid    T2DM (type 2 diabetes mellitus)    ALLERGIES/INTOLERANCES:  No Known Allergies    HOME MEDICATIONS:    INPATIENT MEDICATIONS:    enoxaparin Injectable 40 milliGRAM(s) SubCutaneous every 12 hours    ARIPiprazole 10 milliGRAM(s) Oral daily  atorvastatin 20 milliGRAM(s) Oral at bedtime  benzocaine 15 mG/menthol 3.6 mG (Sugar-Free) Lozenge 1 Lozenge Oral two times a day PRN  cholecalciferol 1000 Unit(s) Oral daily  dexAMETHasone  Injectable 6 milliGRAM(s) IV Push every 24 hours  dextrose 40% Gel 15 Gram(s) Oral once PRN  dextrose 5%. 1000 milliLiter(s) IV Continuous <Continuous>  dextrose 50% Injectable 12.5 Gram(s) IV Push once  dextrose 50% Injectable 25 Gram(s) IV Push once  dextrose 50% Injectable 25 Gram(s) IV Push once  diVALproex ER 1000 milliGRAM(s) Oral at bedtime  ferrous    sulfate 325 milliGRAM(s) Oral daily  glucagon  Injectable 1 milliGRAM(s) IntraMuscular once PRN  influenza  Vaccine (HIGH DOSE) 0.7 milliLiter(s) IntraMuscular once  insulin lispro (HumaLOG) corrective regimen sliding scale   SubCutaneous Before meals and at bedtime  liothyronine 25 MICROGram(s) Oral daily  oxybutynin 5 milliGRAM(s) Oral two times a day  PARoxetine 30 milliGRAM(s) Oral daily  polyethylene glycol 3350 17 Gram(s) Oral every 24 hours  QUEtiapine 400 milliGRAM(s) Oral at bedtime  remdesivir  IVPB   IV Intermittent   remdesivir  IVPB 100 milliGRAM(s) IV Intermittent every 24 hours      REVIEW OF SYSTEMS:    CONSTITUTIONAL: No weakness, F/C, wt loss/gain  EYES: No visual changes/disturbances  ENMT: No dry mouth, no vertigo  NECK: No pain or stiffness  RESPIRATORY: No cough, wheezing, hemoptysis; No shortness of breath  CARDIOVASCULAR: No chest pain, palpitations, lightheadedness/dizziness, LOC, or leg swelling  GASTROINTESTINAL: No abdominal or epigastric pain. No N/V/D/C. No melena, hematochezia, or hematemesis.  GENITOURINARY: No dysuria, increased frequency, hematuria, or incontinence  NEUROLOGICAL: No lightheadedness/dizziness, LOC, headaches, numbness or weakness  MUSCULOSKELETAL: No joint pain or swelling; No muscle, back, or extremity pain  SKIN: No itching, burning, rashes, or lesions   ENDOCRINE: No heat or cold intolerance; No hair loss  HEME/LYMPH: No easy bruising, or bleeding gums  PSYCHIATRIC: No depression, anxiety, mood swings, or difficulty sleeping    [ ] All other review of systems are negative unless indicated above.  [ ] Unable to obtain due to:    PHYSICAL EXAM:    T(C): 36.1 (10-02-20 @ 04:56), Max: 36.1 (10-01-20 @ 13:16)  HR: 42 (10-02-20 @ 04:56) (42 - 54)  BP: 110/55 (10-02-20 @ 04:56) (110/55 - 135/63)  RR: 18 (10-02-20 @ 04:56) (18 - 18)  SpO2: 94% (10-02-20 @ 04:56) (94% - 95%)  Wt(kg): --    I&O's Summary  GENERAL: NAD, well-developed  HEAD:  NCAT  HEENT: EOMI, PERRL, conjunctiva and sclera clear; moist mucosa; Neck supple, No JVD  CARDIOVASCULAR: RRR, normal S1 S2, no M/R/G, no JVD, neg HJR, nondisplaced PMI, no LE edema  RESPIRATORY: Lungs clear to auscultation b/l, no C/W/R  GASTROINTESTINAL: +BS, soft, non-distended, non-tender, no HSM  VASCULAR: Peripheral pulses palpable 2+ bilaterally  EXTREMITIES: Warm. No clubbing, cyanosis or edema. Normal range of motion.  SKIN: No rashes, lesions, ecchymoses, or cyanosis  NEURO: AAOx3, no focal deficits  PSYCH: Nl behavior, nl affect  LINES:    TELEMETRY: 	      ECG:  	  	  LABS:                        11.6   5.24  )-----------( 134      ( 02 Oct 2020 06:56 )             37.5     10-02    143  |  105  |  32<H>  ----------------------------<  195<H>  4.5   |  26  |  0.73    Ca    9.4      02 Oct 2020 06:56  Phos  4.1     10-02  Mg     2.5     10-02    TPro  7.1  /  Alb  3.3  /  TBili  0.2  /  DBili  x   /  AST  12  /  ALT  9<L>  /  AlkPhos  65  10-02    A/P:  65F PMH BPD, DM, recurrent UTIs on nitrofurantoin, hypothyroidism, p/w cough/fever in setting of COVID.    #Bradycardia: less likely to be medication side effect as antipsychotics have been long term medications. Asymptomatic.  - No acute interventions needed  - Atropine at bedside for symptomatic worsening bradycardia. If unresponsive, could start dopamine gtt although no need at this point.    not d/w attending yet  Bakari Cedeño MD PGY4   CHIEF COMPLAINT:    HPI:  65F w PMHx BPD, ?PD, HLD, T2DM, anemia, multiple UTIs on chronic nitrofurantoin, hypothyroidism presenting from nursing home with cough and reported fever of 104F, with CXR at nursing home suspicious for RUL infiltrate. Of note, pt also tested positive for COVID19. Pt endorses intermittent nonproductive cough, denies difficulty breathing, chest tightness, CP, abdominal pain, N/V/D/C, dysuria.    65F PMH BPD, DM, recurrent UTIs on nitrofurantoin, hypothyroidism, p/w cough/fever in setting of COVID. Telemetry with short runs of SVT 2d ago, and now with bradycardia to 40s. HR trend lower during sleep but also 40s during waketime. Denies CP/SOB/lightheadedness, recent med changes. Ambulatory without difficulty. TSH .14.     ED Vitals: T 101.4, HR 93, /57, RR 24 SpO2 93% on 2LNC  Labs significant for Lactate 3 (resolved to 0.9), Procalcitonin 0.32, CRP mildly elevated 7.57, no leukocytosis  CXR: patchy b/l infiltrates  EKG: normal sinus  Received 1g tylenol, 500cc NS bolus,  1750mg Vancomycin, 3.375g Zosyn    Pt admitted to [facility] for sepsis 2/2 PNA secondary to COVID-19 vs bacterial PNA (29 Sep 2020 20:10)    PAST MEDICAL & SURGICAL HISTORY:  Bipolar disorder    Hypothyroid    T2DM (type 2 diabetes mellitus)    ALLERGIES/INTOLERANCES:  No Known Allergies    HOME MEDICATIONS:    INPATIENT MEDICATIONS:    enoxaparin Injectable 40 milliGRAM(s) SubCutaneous every 12 hours    ARIPiprazole 10 milliGRAM(s) Oral daily  atorvastatin 20 milliGRAM(s) Oral at bedtime  benzocaine 15 mG/menthol 3.6 mG (Sugar-Free) Lozenge 1 Lozenge Oral two times a day PRN  cholecalciferol 1000 Unit(s) Oral daily  dexAMETHasone  Injectable 6 milliGRAM(s) IV Push every 24 hours  dextrose 40% Gel 15 Gram(s) Oral once PRN  dextrose 5%. 1000 milliLiter(s) IV Continuous <Continuous>  dextrose 50% Injectable 12.5 Gram(s) IV Push once  dextrose 50% Injectable 25 Gram(s) IV Push once  dextrose 50% Injectable 25 Gram(s) IV Push once  diVALproex ER 1000 milliGRAM(s) Oral at bedtime  ferrous    sulfate 325 milliGRAM(s) Oral daily  glucagon  Injectable 1 milliGRAM(s) IntraMuscular once PRN  influenza  Vaccine (HIGH DOSE) 0.7 milliLiter(s) IntraMuscular once  insulin lispro (HumaLOG) corrective regimen sliding scale   SubCutaneous Before meals and at bedtime  liothyronine 25 MICROGram(s) Oral daily  oxybutynin 5 milliGRAM(s) Oral two times a day  PARoxetine 30 milliGRAM(s) Oral daily  polyethylene glycol 3350 17 Gram(s) Oral every 24 hours  QUEtiapine 400 milliGRAM(s) Oral at bedtime  remdesivir  IVPB   IV Intermittent   remdesivir  IVPB 100 milliGRAM(s) IV Intermittent every 24 hours      REVIEW OF SYSTEMS:    CONSTITUTIONAL: No weakness, F/C, wt loss/gain  EYES: No visual changes/disturbances  ENMT: No dry mouth, no vertigo  NECK: No pain or stiffness  RESPIRATORY: No cough, wheezing, hemoptysis; No shortness of breath  CARDIOVASCULAR: No chest pain, palpitations, lightheadedness/dizziness, LOC, or leg swelling  GASTROINTESTINAL: No abdominal or epigastric pain. No N/V/D/C. No melena, hematochezia, or hematemesis.  GENITOURINARY: No dysuria, increased frequency, hematuria, or incontinence  NEUROLOGICAL: No lightheadedness/dizziness, LOC, headaches, numbness or weakness  MUSCULOSKELETAL: No joint pain or swelling; No muscle, back, or extremity pain  SKIN: No itching, burning, rashes, or lesions   ENDOCRINE: No heat or cold intolerance; No hair loss  HEME/LYMPH: No easy bruising, or bleeding gums  PSYCHIATRIC: No depression, anxiety, mood swings, or difficulty sleeping    [ ] All other review of systems are negative unless indicated above.  [ ] Unable to obtain due to:    PHYSICAL EXAM:    T(C): 36.1 (10-02-20 @ 04:56), Max: 36.1 (10-01-20 @ 13:16)  HR: 42 (10-02-20 @ 04:56) (42 - 54)  BP: 110/55 (10-02-20 @ 04:56) (110/55 - 135/63)  RR: 18 (10-02-20 @ 04:56) (18 - 18)  SpO2: 94% (10-02-20 @ 04:56) (94% - 95%)  Wt(kg): --    I&O's Summary  NAD, on room air, RR 16  Regular, bradycardic  LE no edema  Further exam deferred  	  LABS:                        11.6   5.24  )-----------( 134      ( 02 Oct 2020 06:56 )             37.5     10-02    143  |  105  |  32<H>  ----------------------------<  195<H>  4.5   |  26  |  0.73    Ca    9.4      02 Oct 2020 06:56  Phos  4.1     10-02  Mg     2.5     10-02    TPro  7.1  /  Alb  3.3  /  TBili  0.2  /  DBili  x   /  AST  12  /  ALT  9<L>  /  AlkPhos  65  10-02    A/P:  65F PMH BPD, DM, recurrent UTIs on nitrofurantoin, hypothyroidism, p/w cough/fever in setting of COVID.    #Bradycardia: less likely to be medication side effect as antipsychotics have been long term medications. Asymptomatic.  - No acute interventions needed  - Atropine at bedside for symptomatic worsening bradycardia. If unresponsive, could start dopamine gtt although no need at this point. Pacing for unstable bradycardia.    not d/w attending yet  Bakari Cedeño MD PGY4

## 2020-10-02 NOTE — PROGRESS NOTE ADULT - PROBLEM SELECTOR PLAN 5
PRE-OP DIAGNOSIS:  Spondylolisthesis, lumbar region 05-Jul-2019 15:18:58  Araceli Wisdom  Spondylolisthesis, lumbar region 05-Jul-2019 15:18:42  Araceli Wisdom Pt known to have hypothyroidism. Observed to be bradycardic on 10/1 with mild chest discomfort.  - Repeat TSH, T4  - c/w liothyronine 25mcg (home med)

## 2020-10-02 NOTE — PROGRESS NOTE ADULT - PROBLEM SELECTOR PLAN 1
Pt presented with dyspnea and was found to be hypoxemic requiring NRB supplemental oxygen. Since 10/1, she has been on NRB 10L with SpO2 92-94% and denies any worsening of her dyspnea/cough today 10/2. SpO2 noted to decrease to 85% on 10L NRB. NRB increased to 15L with improvement of SpO2 to 91-94%  - c/w 15L NRB  - Consider HFNC if NRB does not continue to keep SpO2 >88-90%  - f/u ABG and lactate

## 2020-10-02 NOTE — PROGRESS NOTE ADULT - PROBLEM SELECTOR PLAN 3
Pt COVID postive x2,  presenting with fever of 101.4F in ED on 9/29  -contact/droplet isolation precautions  -c/w 15L NRB, wean back to 10L as tolerated (see above problem)   -c/w Remdesivir 200 mg x1, then 100 mg x 4 days (last day 10/3 @10 PM)  -c/w Decadron IV 6mg daily (first dose 9/30 12 AM)  -f/u daily COVID labs- CBC w/ diff, CMP, ferritin, CRP, D-dimer, Mg, Phos  -pt reported chest pain on 10/1 AM- f/u EKG

## 2020-10-02 NOTE — CONSULT NOTE ADULT - ASSESSMENT
65 F with PMH BPD, DM, recurrent UTIs on nitrofurantoin, hypothyroidism, who presented from NH with reported Fever to 104, cough and chills, found to have Hypoxic Respiratory Failure 2/2 COVID PNA for which she was started on NRB, Antiviral, Decadron and Broad Spectrum ABx. Hospital course notable for New onset assymptomatic Bradycardia, for which cardiology and CCU are being consulted    1) Marked Sinus Bradycardia in Assymptomatic Patient  -Clinically patient is asymptomatic without lightheadedness dizziness, Nausea. She has chronotropic response with HR picking up to the low 50's with movement  -Clinically she is warm, and labs dont show evidence of poor fwd flow: Preserved renal and Hepatic function  -Tele, EKG and vitals reviewed. When patient was first admitted with a T Max of 104 her pulse was 'low' in the 80's. Trends are now 30-40's in sinus, without evidence of Block or pause, with appropriate  compensatory SBP in the 130's-140's  -Would not treat these SBP with Anti Hypertensive therapies  - Bradycardia has been associated with Patient with COVID PNA, with and without Hypoxia. Would recommend obtaining an ABG to ensure patient not Hypoxic.  -Additionally medication list reviewed. Patient has been on Decadron IV since hospitalization. Cardiac arrhythmias have been reported in patient on IV steroids irrespective of dose as bradyarrhythmia associated with parenteral administration has been documented more commonly than with oral steroids.  -All in all this bradyarrhythmia is not pathological, however given its persistance in patient with COVID PNA, recommend starting Dobutamine 2.5 with goal to slowly uptitrate to 5 and monitor HR response. SHould patient become symptomatic and or tachycardic would DC the infusion and alert Cardiology fellow  -Recommend escalating care to MICU for closer monitoring. Patient to get Shelby and Shah placed by primary team    Cardiology consult will continue to follow. 65 F with PMH BPD, DM, recurrent UTIs on nitrofurantoin, hypothyroidism, who presented from NH with reported Fever to 104, cough and chills, found to have Hypoxic Respiratory Failure 2/2 COVID PNA for which she was started on NRB, Antiviral, Decadron and Broad Spectrum ABx. Hospital course notable for New onset presymptomatic Bradycardia, for which cardiology and CCU are being consulted    1) Marked Sinus Bradycardia in Asymptomatic Patient  -Clinically patient is asymptomatic without lightheadedness dizziness, Nausea. She has chronotropic response with HR picking up to the low 50's with movement  -Clinically she is warm, and labs dont show evidence of poor fwd flow: Preserved renal and Hepatic function  -Tele, EKG and vitals reviewed. When patient was first admitted with a T Max of 104 her pulse was 'low' in the 80's. Trends are now 30-40's in sinus, without evidence of Block or pause, with appropriate  compensatory SBP in the 130's-140's  -Would not treat these SBP with Anti Hypertensive therapies  - Bradycardia has been associated with Patient with COVID PNA, with and without Hypoxia. Would recommend obtaining an ABG to ensure patient not Hypoxic.  -Additionally medication list reviewed. Patient has been on Decadron IV since hospitalization. Cardiac arrhythmias have been reported in patient on IV steroids irrespective of dose as bradyarrhythmia associated with parenteral administration has been documented more commonly than with oral steroids.  -Lastly patient has known Hypothyroidism. Thyroid dysfunction can contribute to worsening bradycardia  -All in all this bradyarrhythmia is not pathological, however given its persistence in patient with COVID PNA, recommend starting Dobutamine 2.5 with goal to slowly uptitrate to 5 and monitor HR response. SHould patient become symptomatic and or tachycardic would DC the infusion and alert Cardiology fellow  -Recommend escalating care to MICU for closer monitoring. Patient to get Shelby and Shah placed by primary team  -Keep Atropine at bedside and Pacer pads on    Cardiology consult will continue to follow.

## 2020-10-02 NOTE — PROGRESS NOTE ADULT - PROBLEM SELECTOR PLAN 6
-oxybutynin 5 mg BID -Aripiprazole 10mg q24h  -depakote 1000 mg PO qhs  -quetiapine 400 mg PO qhs  -paroxetine 30mg PO q24h  -monitor for QTc prolongation, extrapyramidal side effects. QTc 416 on admission EKG

## 2020-10-02 NOTE — PROGRESS NOTE ADULT - PROBLEM SELECTOR PLAN 3
HgbA1c of 6.9  -on Januvia and metformin at home  -mISS  -monitor FSG Pt COVID postive x2,  presenting with fever of 101.4F in ED on 9/29  -contact/droplet isolation precautions  -c/w 15L NRB, wean back to 10L as tolerated (see above problem)   -c/w Remdesivir 200 mg x1, then 100 mg x 4 days (last day 10/3 @10 PM)  -c/w Decadron IV 6mg daily (first dose 9/30 12 AM)  -f/u daily COVID labs- CBC w/ diff, CMP, ferritin, CRP, D-dimer, Mg, Phos  -pt. reported chest pain on 10/1 AM- f/u EKG

## 2020-10-02 NOTE — PROGRESS NOTE ADULT - PROBLEM SELECTOR PROBLEM 8
Hyperlipidemia, unspecified hyperlipidemia type Iron deficiency anemia, unspecified iron deficiency anemia type

## 2020-10-02 NOTE — PROCEDURE NOTE - NSPROCDETAILS_GEN_ALL_CORE
location identified, draped/prepped, sterile technique used, needle inserted/introduced/sutured in place/all materials/supplies accounted for at end of procedure/positive blood return obtained via catheter/ultrasound guidance/connected to a pressurized flush line/hemostasis with direct pressure, dressing applied/Seldinger technique

## 2020-10-02 NOTE — PROGRESS NOTE ADULT - ASSESSMENT
65F w PMHx bipolar disorder, HLD, T2DM, anemia, multiple UTIs on chronic nitrofurantoin, hypothyroidism presenting from nursing home with cough and fever, admitted to [facility] for management of sepsis 2/2 COVID19 PNA vs bacterial PNA.

## 2020-10-02 NOTE — CONSULT NOTE ADULT - SUBJECTIVE AND OBJECTIVE BOX
Patient is a 65 F with PMH BPD, DM, recurrent UTIs on nitrofurantoin, hypothyroidism, who presented from NH with reported Fever of 104, cough and chills, found to have Hypoxic Respiratory Failure 2/2 COVID PNA for which she was started on NRB, Antiviral, Decadron and Broad Spectrum ABx. Hospital course notable for New onset Bradycardia, different from admission for which cardiology and CCU are being consulted  Tele reviewed. Patient with sinus bradycardia without ectopy, pauses or evidence of block with compensatory SBP in the 140's. Per primary team, episode of SVT 2 days prior, however it is not visible on Tele.  She remains asymptomatic      PAST MEDICAL & SURGICAL HISTORY:  Bipolar disorder  Hypothyroid  T2DM (type 2 diabetes mellitus)    Home Medications:  ARIPiprazole 10 mg oral tablet: 1 tab(s) orally once a day (29 Sep 2020 22:33)  atorvastatin 20 mg oral tablet: 1 tab(s) orally once a day (29 Sep 2020 22:33)  cholecalciferol 1000 intl units (25 mcg) oral capsule: 1 cap(s) orally once a day (29 Sep 2020 22:33)  divalproex sodium 500 mg oral delayed release tablet: 2 tab(s) orally once a day (at bedtime) (29 Sep 2020 22:33)  ferrous sulfate 325 mg (65 mg elemental iron) oral tablet: 1 tab(s) orally once a day (29 Sep 2020 22:33)  Januvia 100 mg oral tablet: 1 tab(s) orally once a day (29 Sep 2020 22:33)  liothyronine 25 mcg oral tablet: 1 tab(s) orally once a day (29 Sep 2020 22:33)  metFORMIN 1000 mg oral tablet: 1 tab(s) orally 2 times a day (29 Sep 2020 22:33)  nitrofurantoin macrocrystals 50 mg oral capsule: 1 cap(s) orally once a day (29 Sep 2020 22:33)  oxybutynin 10 mg/24 hr oral tablet, extended release: 1 tab(s) orally once a day (29 Sep 2020 22:33)  PARoxetine 30 mg oral tablet: 1 tab(s) orally once a day (29 Sep 2020 22:33)  polyethylene glycol 3350 oral powder for reconstitution: 17 gram(s) orally every 24 hours (29 Sep 2020 22:33)  QUEtiapine 200 mg oral tablet: 2 tab(s) orally once a day (at bedtime) (29 Sep 2020 22:33)  Vascepa 1 g oral capsule: 2 cap(s) orally 2 times a day (29 Sep 2020 22:33)      MEDICATIONS  (STANDING):  ARIPiprazole 10 milliGRAM(s) Oral daily  atorvastatin 20 milliGRAM(s) Oral at bedtime  cholecalciferol 1000 Unit(s) Oral daily  dexAMETHasone  Injectable 6 milliGRAM(s) IV Push every 24 hours  dextrose 5%. 1000 milliLiter(s) (50 mL/Hr) IV Continuous <Continuous>  dextrose 50% Injectable 12.5 Gram(s) IV Push once  dextrose 50% Injectable 25 Gram(s) IV Push once  dextrose 50% Injectable 25 Gram(s) IV Push once  diVALproex ER 1000 milliGRAM(s) Oral at bedtime  DOBUTamine Infusion 1.475 MICROgram(s)/kG/Min (2.5 mL/Hr) IV Continuous <Continuous>  enoxaparin Injectable 40 milliGRAM(s) SubCutaneous every 12 hours  ferrous    sulfate 325 milliGRAM(s) Oral daily  influenza  Vaccine (HIGH DOSE) 0.7 milliLiter(s) IntraMuscular once  insulin lispro (HumaLOG) corrective regimen sliding scale   SubCutaneous Before meals and at bedtime  liothyronine 25 MICROGram(s) Oral daily  PARoxetine 30 milliGRAM(s) Oral daily  polyethylene glycol 3350 17 Gram(s) Oral every 24 hours  QUEtiapine 400 milliGRAM(s) Oral at bedtime  remdesivir  IVPB   IV Intermittent   remdesivir  IVPB 100 milliGRAM(s) IV Intermittent every 24 hours    MEDICATIONS  (PRN):  benzocaine 15 mG/menthol 3.6 mG (Sugar-Free) Lozenge 1 Lozenge Oral two times a day PRN Sore Throat  dextrose 40% Gel 15 Gram(s) Oral once PRN Blood Glucose LESS THAN 70 milliGRAM(s)/deciliter  glucagon  Injectable 1 milliGRAM(s) IntraMuscular once PRN Glucose LESS THAN 70 milligrams/deciliter      .  VITAL SIGNS:  T(C): 37.1 (10-02-20 @ 18:13), Max: 37.1 (10-02-20 @ 18:13)  T(F): 98.7 (10-02-20 @ 18:13), Max: 98.7 (10-02-20 @ 18:13)  HR: 31 (10-02-20 @ 17:49) (31 - 46)  BP: 142/90 (10-02-20 @ 17:49) (110/55 - 153/72)  BP(mean): --  RR: 25 (10-02-20 @ 17:49) (18 - 25)  SpO2: 95% (10-02-20 @ 17:49) (90% - 95%)  Wt(kg): --    PHYSICAL EXAM: INCOMPLETE    Constitutional: WDWN resting comfortably in bed; NAD  Head: NC/AT  Eyes: PERRL, EOMI, anicteric sclera  ENT: no nasal discharge; uvula midline, no oropharyngeal erythema or exudates; MMM  Neck: supple; no JVD or thyromegaly  Respiratory: CTA B/L; no W/R/R, no retractions  Cardiac: +S1/S2; RRR; no M/R/G; PMI non-displaced  Gastrointestinal: soft, NT/ND; no rebound or guarding; +BSx4  Genitourinary: normal external genitalia  Back: spine midline, no bony tenderness or step-offs; no CVAT B/L  Extremities: WWP, no clubbing or cyanosis; no peripheral edema  Musculoskeletal: NROM x4; no joint swelling, tenderness or erythema  Vascular: 2+ radial, femoral, DP/PT pulses B/L  Dermatologic: skin warm, dry and intact; no rashes, wounds, or scars  Lymphatic: no submandibular or cervical LAD  Neurologic: AAOx3; CNII-XII grossly intact; no focal deficits  Psychiatric: affect and characteristics of appearance, verbalizations, behaviors are appropriate      .  LABS:                         11.6   5.24  )-----------( 134      ( 02 Oct 2020 06:56 )             37.5     10-02    143  |  105  |  32<H>  ----------------------------<  195<H>  4.5   |  26  |  0.73    Ca    9.4      02 Oct 2020 06:56  Phos  4.1     10-02  Mg     2.5     10-02    TPro  7.1  /  Alb  3.3  /  TBili  0.2  /  DBili  x   /  AST  12  /  ALT  9<L>  /  AlkPhos  65  10-02    RADIOLOGY, EKG & ADDITIONAL TESTS: Reviewed.       < from: TTE Echo Complete w/o Contrast w/ Doppler (06.30.20 @ 14:22) >  CONCLUSIONS:     1. Limitedstudy obtained for evaluation of left ventricular function.   2. There is trace tricuspid regurgitation. Pulmonary artery systolic pressure (estimated using the tricuspid regurgitant gradient and an estimate of right atrial pressure) is atleast 29 mmHg ( IVC not visualized on this study).   3. Right ventricular systolic function is probably normal.   4. There is a septal "knuckle" with no evidence of left ventriclular outflow obstruction. The left ventricle is normal in size and systolic functionwith a calculated ejection fraction of 60%.   5. No pericardial effusion.      < end of copied text >  < from: TTE Echo Complete w/o Contrast w/ Doppler (06.30.20 @ 14:22) >  eft Ventricle:  There is a septal "knuckle" with no evidence of left ventriclular outflow obstruction. The left ventricle is normal in size and systolic function with a calculated ejection fraction of 60%.    < end of copied text >      < from: Xray Chest 1 View- PORTABLE-Urgent (Xray Chest 1 View- PORTABLE-Urgent .) (10.02.20 @ 10:16) >  Findings/  impression: Bilateral opacities, stable. Heart and mediastinum are unremarkable. Stable bony structures.      < end of copied text >     Patient is a 65 F with PMH BPD, DM, recurrent UTIs on nitrofurantoin, hypothyroidism, who presented from NH with reported Fever to 104, cough and chills, found to have Hypoxic Respiratory Failure 2/2 COVID PNA for which she was started on NRB, Antiviral, Decadron and Broad Spectrum ABx. Hospital course notable for New onset Bradycardia, different from admission for which cardiology and CCU are being consulted  Tele reviewed. Patient with sinus bradycardia without ectopy, pauses or evidence of block with compensatory SBP in the 140's. Per primary team, episode of SVT 2 days prior, however it is not visible on Tele.        PAST MEDICAL & SURGICAL HISTORY:  Bipolar disorder  Hypothyroid  T2DM (type 2 diabetes mellitus)    Home Medications:  ARIPiprazole 10 mg oral tablet: 1 tab(s) orally once a day (29 Sep 2020 22:33)  atorvastatin 20 mg oral tablet: 1 tab(s) orally once a day (29 Sep 2020 22:33)  cholecalciferol 1000 intl units (25 mcg) oral capsule: 1 cap(s) orally once a day (29 Sep 2020 22:33)  divalproex sodium 500 mg oral delayed release tablet: 2 tab(s) orally once a day (at bedtime) (29 Sep 2020 22:33)  ferrous sulfate 325 mg (65 mg elemental iron) oral tablet: 1 tab(s) orally once a day (29 Sep 2020 22:33)  Januvia 100 mg oral tablet: 1 tab(s) orally once a day (29 Sep 2020 22:33)  liothyronine 25 mcg oral tablet: 1 tab(s) orally once a day (29 Sep 2020 22:33)  metFORMIN 1000 mg oral tablet: 1 tab(s) orally 2 times a day (29 Sep 2020 22:33)  nitrofurantoin macrocrystals 50 mg oral capsule: 1 cap(s) orally once a day (29 Sep 2020 22:33)  oxybutynin 10 mg/24 hr oral tablet, extended release: 1 tab(s) orally once a day (29 Sep 2020 22:33)  PARoxetine 30 mg oral tablet: 1 tab(s) orally once a day (29 Sep 2020 22:33)  polyethylene glycol 3350 oral powder for reconstitution: 17 gram(s) orally every 24 hours (29 Sep 2020 22:33)  QUEtiapine 200 mg oral tablet: 2 tab(s) orally once a day (at bedtime) (29 Sep 2020 22:33)  Vascepa 1 g oral capsule: 2 cap(s) orally 2 times a day (29 Sep 2020 22:33)      MEDICATIONS  (STANDING):  ARIPiprazole 10 milliGRAM(s) Oral daily  atorvastatin 20 milliGRAM(s) Oral at bedtime  cholecalciferol 1000 Unit(s) Oral daily  dexAMETHasone  Injectable 6 milliGRAM(s) IV Push every 24 hours  dextrose 5%. 1000 milliLiter(s) (50 mL/Hr) IV Continuous <Continuous>  dextrose 50% Injectable 12.5 Gram(s) IV Push once  dextrose 50% Injectable 25 Gram(s) IV Push once  dextrose 50% Injectable 25 Gram(s) IV Push once  diVALproex ER 1000 milliGRAM(s) Oral at bedtime  DOBUTamine Infusion 1.475 MICROgram(s)/kG/Min (2.5 mL/Hr) IV Continuous <Continuous>  enoxaparin Injectable 40 milliGRAM(s) SubCutaneous every 12 hours  ferrous    sulfate 325 milliGRAM(s) Oral daily  influenza  Vaccine (HIGH DOSE) 0.7 milliLiter(s) IntraMuscular once  insulin lispro (HumaLOG) corrective regimen sliding scale   SubCutaneous Before meals and at bedtime  liothyronine 25 MICROGram(s) Oral daily  PARoxetine 30 milliGRAM(s) Oral daily  polyethylene glycol 3350 17 Gram(s) Oral every 24 hours  QUEtiapine 400 milliGRAM(s) Oral at bedtime  remdesivir  IVPB   IV Intermittent   remdesivir  IVPB 100 milliGRAM(s) IV Intermittent every 24 hours    MEDICATIONS  (PRN):  benzocaine 15 mG/menthol 3.6 mG (Sugar-Free) Lozenge 1 Lozenge Oral two times a day PRN Sore Throat  dextrose 40% Gel 15 Gram(s) Oral once PRN Blood Glucose LESS THAN 70 milliGRAM(s)/deciliter  glucagon  Injectable 1 milliGRAM(s) IntraMuscular once PRN Glucose LESS THAN 70 milligrams/deciliter      .  VITAL SIGNS:  T(C): 37.1 (10-02-20 @ 18:13), Max: 37.1 (10-02-20 @ 18:13)  T(F): 98.7 (10-02-20 @ 18:13), Max: 98.7 (10-02-20 @ 18:13)  HR: 31 (10-02-20 @ 17:49) (31 - 46)  BP: 142/90 (10-02-20 @ 17:49) (110/55 - 153/72)  BP(mean): --  RR: 25 (10-02-20 @ 17:49) (18 - 25)  SpO2: 95% (10-02-20 @ 17:49) (90% - 95%)  Wt(kg): --    PHYSICAL EXAM: INCOMPLETE    Constitutional: WDWN resting comfortably in bed; NAD  Head: NC/AT  Eyes: PERRL, EOMI, anicteric sclera  ENT: no nasal discharge; uvula midline, no oropharyngeal erythema or exudates; MMM  Neck: supple; no JVD or thyromegaly  Respiratory: CTA B/L; no W/R/R, no retractions  Cardiac: +S1/S2; RRR; no M/R/G; PMI non-displaced  Gastrointestinal: soft, NT/ND; no rebound or guarding; +BSx4  Genitourinary: normal external genitalia  Back: spine midline, no bony tenderness or step-offs; no CVAT B/L  Extremities: WWP, no clubbing or cyanosis; no peripheral edema  Musculoskeletal: NROM x4; no joint swelling, tenderness or erythema  Vascular: 2+ radial, femoral, DP/PT pulses B/L  Dermatologic: skin warm, dry and intact; no rashes, wounds, or scars  Lymphatic: no submandibular or cervical LAD  Neurologic: AAOx3; CNII-XII grossly intact; no focal deficits  Psychiatric: affect and characteristics of appearance, verbalizations, behaviors are appropriate      .  LABS:                         11.6   5.24  )-----------( 134      ( 02 Oct 2020 06:56 )             37.5     10-02    143  |  105  |  32<H>  ----------------------------<  195<H>  4.5   |  26  |  0.73    Ca    9.4      02 Oct 2020 06:56  Phos  4.1     10-02  Mg     2.5     10-02    TPro  7.1  /  Alb  3.3  /  TBili  0.2  /  DBili  x   /  AST  12  /  ALT  9<L>  /  AlkPhos  65  10-02    RADIOLOGY, EKG & ADDITIONAL TESTS: Reviewed.       < from: TTE Echo Complete w/o Contrast w/ Doppler (06.30.20 @ 14:22) >  CONCLUSIONS:     1. Limitedstudy obtained for evaluation of left ventricular function.   2. There is trace tricuspid regurgitation. Pulmonary artery systolic pressure (estimated using the tricuspid regurgitant gradient and an estimate of right atrial pressure) is atleast 29 mmHg ( IVC not visualized on this study).   3. Right ventricular systolic function is probably normal.   4. There is a septal "knuckle" with no evidence of left ventriclular outflow obstruction. The left ventricle is normal in size and systolic functionwith a calculated ejection fraction of 60%.   5. No pericardial effusion.      < end of copied text >  < from: TTE Echo Complete w/o Contrast w/ Doppler (06.30.20 @ 14:22) >  eft Ventricle:  There is a septal "knuckle" with no evidence of left ventriclular outflow obstruction. The left ventricle is normal in size and systolic function with a calculated ejection fraction of 60%.    < end of copied text >      < from: Xray Chest 1 View- PORTABLE-Urgent (Xray Chest 1 View- PORTABLE-Urgent .) (10.02.20 @ 10:16) >  Findings/  impression: Bilateral opacities, stable. Heart and mediastinum are unremarkable. Stable bony structures.      < end of copied text >     Patient is a 65 F with PMH BPD, DM, recurrent UTIs on nitrofurantoin, hypothyroidism, who presented from NH with reported Fever to 104, cough and chills, found to have Hypoxic Respiratory Failure 2/2 COVID PNA for which she was started on NRB, Antiviral, Decadron and Broad Spectrum ABx. Hospital course notable for New onset Bradycardia, different from admission for which cardiology and CCU are being consulted  Tele reviewed. Patient with sinus bradycardia without ectopy, pauses or evidence of block with compensatory SBP in the 140's. Per primary team, episode of SVT 2 days prior, however it is not visible on Tele.    PAST MEDICAL & SURGICAL HISTORY:  Bipolar disorder  Hypothyroid  T2DM (type 2 diabetes mellitus)    Home Medications:  ARIPiprazole 10 mg oral tablet: 1 tab(s) orally once a day (29 Sep 2020 22:33)  atorvastatin 20 mg oral tablet: 1 tab(s) orally once a day (29 Sep 2020 22:33)  cholecalciferol 1000 intl units (25 mcg) oral capsule: 1 cap(s) orally once a day (29 Sep 2020 22:33)  divalproex sodium 500 mg oral delayed release tablet: 2 tab(s) orally once a day (at bedtime) (29 Sep 2020 22:33)  ferrous sulfate 325 mg (65 mg elemental iron) oral tablet: 1 tab(s) orally once a day (29 Sep 2020 22:33)  Januvia 100 mg oral tablet: 1 tab(s) orally once a day (29 Sep 2020 22:33)  liothyronine 25 mcg oral tablet: 1 tab(s) orally once a day (29 Sep 2020 22:33)  metFORMIN 1000 mg oral tablet: 1 tab(s) orally 2 times a day (29 Sep 2020 22:33)  nitrofurantoin macrocrystals 50 mg oral capsule: 1 cap(s) orally once a day (29 Sep 2020 22:33)  oxybutynin 10 mg/24 hr oral tablet, extended release: 1 tab(s) orally once a day (29 Sep 2020 22:33)  PARoxetine 30 mg oral tablet: 1 tab(s) orally once a day (29 Sep 2020 22:33)  polyethylene glycol 3350 oral powder for reconstitution: 17 gram(s) orally every 24 hours (29 Sep 2020 22:33)  QUEtiapine 200 mg oral tablet: 2 tab(s) orally once a day (at bedtime) (29 Sep 2020 22:33)  Vascepa 1 g oral capsule: 2 cap(s) orally 2 times a day (29 Sep 2020 22:33)      MEDICATIONS  (STANDING):  ARIPiprazole 10 milliGRAM(s) Oral daily  atorvastatin 20 milliGRAM(s) Oral at bedtime  cholecalciferol 1000 Unit(s) Oral daily  dexAMETHasone  Injectable 6 milliGRAM(s) IV Push every 24 hours  dextrose 5%. 1000 milliLiter(s) (50 mL/Hr) IV Continuous <Continuous>  dextrose 50% Injectable 12.5 Gram(s) IV Push once  dextrose 50% Injectable 25 Gram(s) IV Push once  dextrose 50% Injectable 25 Gram(s) IV Push once  diVALproex ER 1000 milliGRAM(s) Oral at bedtime  DOBUTamine Infusion 1.475 MICROgram(s)/kG/Min (2.5 mL/Hr) IV Continuous <Continuous>  enoxaparin Injectable 40 milliGRAM(s) SubCutaneous every 12 hours  ferrous    sulfate 325 milliGRAM(s) Oral daily  influenza  Vaccine (HIGH DOSE) 0.7 milliLiter(s) IntraMuscular once  insulin lispro (HumaLOG) corrective regimen sliding scale   SubCutaneous Before meals and at bedtime  liothyronine 25 MICROGram(s) Oral daily  PARoxetine 30 milliGRAM(s) Oral daily  polyethylene glycol 3350 17 Gram(s) Oral every 24 hours  QUEtiapine 400 milliGRAM(s) Oral at bedtime  remdesivir  IVPB   IV Intermittent   remdesivir  IVPB 100 milliGRAM(s) IV Intermittent every 24 hours    MEDICATIONS  (PRN):  benzocaine 15 mG/menthol 3.6 mG (Sugar-Free) Lozenge 1 Lozenge Oral two times a day PRN Sore Throat  dextrose 40% Gel 15 Gram(s) Oral once PRN Blood Glucose LESS THAN 70 milliGRAM(s)/deciliter  glucagon  Injectable 1 milliGRAM(s) IntraMuscular once PRN Glucose LESS THAN 70 milligrams/deciliter      .  VITAL SIGNS:  T(C): 37.1 (10-02-20 @ 18:13), Max: 37.1 (10-02-20 @ 18:13)  T(F): 98.7 (10-02-20 @ 18:13), Max: 98.7 (10-02-20 @ 18:13)  HR: 31 (10-02-20 @ 17:49) (31 - 46)  BP: 142/90 (10-02-20 @ 17:49) (110/55 - 153/72)  BP(mean): --  RR: 25 (10-02-20 @ 17:49) (18 - 25)  SpO2: 95% (10-02-20 @ 17:49) (90% - 95%)  Wt(kg): --    PHYSICAL EXAM: INCOMPLETE    Constitutional: Obese Patient comfortable on NRB; NAD  Head: NC/AT  Neck: supple; no JVD   Respiratory: CTA in apeces; Poor inspiratory effort  Cardiac: +S1/S2; RRR; Dwayne; no M/R/G;  Gastrointestinal: soft, NT/ND; no rebound or guarding; +BS  Extremities: WWP, no clubbing or cyanosis; no peripheral edema  Vascular: 2+ radial, DP/PT pulses B/L  Neurologic: AAOx3; CNII-XII grossly intact; no focal deficits        .  LABS:                         11.6   5.24  )-----------( 134      ( 02 Oct 2020 06:56 )             37.5     10-02    143  |  105  |  32<H>  ----------------------------<  195<H>  4.5   |  26  |  0.73    Ca    9.4      02 Oct 2020 06:56  Phos  4.1     10-02  Mg     2.5     10-02    TPro  7.1  /  Alb  3.3  /  TBili  0.2  /  DBili  x   /  AST  12  /  ALT  9<L>  /  AlkPhos  65  10-02    RADIOLOGY, EKG & ADDITIONAL TESTS: Reviewed.       < from: TTE Echo Complete w/o Contrast w/ Doppler (06.30.20 @ 14:22) >  CONCLUSIONS:     1. Limitedstudy obtained for evaluation of left ventricular function.   2. There is trace tricuspid regurgitation. Pulmonary artery systolic pressure (estimated using the tricuspid regurgitant gradient and an estimate of right atrial pressure) is atleast 29 mmHg ( IVC not visualized on this study).   3. Right ventricular systolic function is probably normal.   4. There is a septal "knuckle" with no evidence of left ventriclular outflow obstruction. The left ventricle is normal in size and systolic functionwith a calculated ejection fraction of 60%.   5. No pericardial effusion.      < end of copied text >  < from: TTE Echo Complete w/o Contrast w/ Doppler (06.30.20 @ 14:22) >  eft Ventricle:  There is a septal "knuckle" with no evidence of left ventriclular outflow obstruction. The left ventricle is normal in size and systolic function with a calculated ejection fraction of 60%.    < end of copied text >      < from: Xray Chest 1 View- PORTABLE-Urgent (Xray Chest 1 View- PORTABLE-Urgent .) (10.02.20 @ 10:16) >  Findings/  impression: Bilateral opacities, stable. Heart and mediastinum are unremarkable. Stable bony structures.      < end of copied text >

## 2020-10-02 NOTE — PROCEDURE NOTE - NSPOSTCAREGUIDE_GEN_A_CORE
Care for catheter as per unit/ICU protocols/Keep the cast/splint/dressing clean and dry/Verbal/written post procedure instructions were given to patient/caregiver/Instructed patient/caregiver to follow-up with primary care physician/Instructed patient/caregiver regarding signs and symptoms of infection

## 2020-10-02 NOTE — CONSULT NOTE ADULT - ASSESSMENT
65 F w PMHx BPD, ?PD, HLD, T2DM, anemia, multiple UTIs on chronic nitrofurantoin, hypothyroidism presenting from nursing home with cough and reported fever of 104F, with CXR at nursing home suspicious for RUL infiltrate. Patient is   positive for COVID19. Patient with sinus bradycardia HR in 30-40 BPM, She is asymptomatic , with -150 on O2 at 10L. Agree with atropin at bedside, pacing pad. Consider starting Dobutamine or Isopril  gtt, for HR,   Dwayne cardia could be secondary to hypoxia and underlying infection. Would hold off transvenous pacing at this time. Will follow.

## 2020-10-02 NOTE — PROCEDURE NOTE - NSINDICATIONS_GEN_A_CORE
cannulation purposes/monitoring purposes/arterial puncture to obtain ABG's/blood sampling/critical patient

## 2020-10-02 NOTE — PROGRESS NOTE ADULT - PROBLEM SELECTOR PLAN 2
Pt observed to have HR <50 overnight 9/30 and 10/1. Reported chest discomfort earlier on 10/1 but currently denies chest pain or palpitations. Denies prior history of bradycardia. EKG shows sinus bradycardia. Currently HR b/t 30-40s.   - Cardiology consulted (Dr. Catalan) and recommends no acute interventions at this time  - Per EP consult, consider dobutamine gtt if pt requires atropine   - Will place arterial line for BP monitoring, as dobutamine known to drop BP  - f/u TSH  - f/u CXR

## 2020-10-02 NOTE — PROGRESS NOTE ADULT - PROBLEM SELECTOR PLAN 4
-f/u TSH- 0.141  -continue liothyronine 25mcg (home med) HgbA1c of 6.9  -Hold home medications, Januvia and Metformin  -mISS  -monitor FSG

## 2020-10-03 LAB
ALBUMIN SERPL ELPH-MCNC: 3.9 G/DL — SIGNIFICANT CHANGE UP (ref 3.3–5)
ALP SERPL-CCNC: 77 U/L — SIGNIFICANT CHANGE UP (ref 40–120)
ALT FLD-CCNC: 15 U/L — SIGNIFICANT CHANGE UP (ref 10–45)
ANION GAP SERPL CALC-SCNC: 13 MMOL/L — SIGNIFICANT CHANGE UP (ref 5–17)
AST SERPL-CCNC: 15 U/L — SIGNIFICANT CHANGE UP (ref 10–40)
BILIRUB SERPL-MCNC: 0.2 MG/DL — SIGNIFICANT CHANGE UP (ref 0.2–1.2)
BUN SERPL-MCNC: 22 MG/DL — SIGNIFICANT CHANGE UP (ref 7–23)
CALCIUM SERPL-MCNC: 9.6 MG/DL — SIGNIFICANT CHANGE UP (ref 8.4–10.5)
CHLORIDE SERPL-SCNC: 106 MMOL/L — SIGNIFICANT CHANGE UP (ref 96–108)
CO2 SERPL-SCNC: 26 MMOL/L — SIGNIFICANT CHANGE UP (ref 22–31)
CREAT SERPL-MCNC: 0.66 MG/DL — SIGNIFICANT CHANGE UP (ref 0.5–1.3)
CRP SERPL-MCNC: 2.08 MG/DL — HIGH (ref 0–0.4)
D DIMER BLD IA.RAPID-MCNC: 1302 NG/ML DDU — HIGH
FERRITIN SERPL-MCNC: 261 NG/ML — HIGH (ref 15–150)
GAS PNL BLDA: SIGNIFICANT CHANGE UP
GLUCOSE BLDC GLUCOMTR-MCNC: 108 MG/DL — HIGH (ref 70–99)
GLUCOSE BLDC GLUCOMTR-MCNC: 180 MG/DL — HIGH (ref 70–99)
GLUCOSE BLDC GLUCOMTR-MCNC: 238 MG/DL — HIGH (ref 70–99)
GLUCOSE BLDC GLUCOMTR-MCNC: 250 MG/DL — HIGH (ref 70–99)
GLUCOSE SERPL-MCNC: 271 MG/DL — HIGH (ref 70–99)
HCT VFR BLD CALC: 39 % — SIGNIFICANT CHANGE UP (ref 34.5–45)
HCT VFR BLD CALC: 39.9 % — SIGNIFICANT CHANGE UP (ref 34.5–45)
HGB BLD-MCNC: 12.3 G/DL — SIGNIFICANT CHANGE UP (ref 11.5–15.5)
HGB BLD-MCNC: 12.4 G/DL — SIGNIFICANT CHANGE UP (ref 11.5–15.5)
LACTATE SERPL-SCNC: 2.2 MMOL/L — HIGH (ref 0.5–2)
MAGNESIUM SERPL-MCNC: 2 MG/DL — SIGNIFICANT CHANGE UP (ref 1.6–2.6)
MCHC RBC-ENTMCNC: 24.6 PG — LOW (ref 27–34)
MCHC RBC-ENTMCNC: 24.7 PG — LOW (ref 27–34)
MCHC RBC-ENTMCNC: 31.1 GM/DL — LOW (ref 32–36)
MCHC RBC-ENTMCNC: 31.5 GM/DL — LOW (ref 32–36)
MCV RBC AUTO: 77.8 FL — LOW (ref 80–100)
MCV RBC AUTO: 79.3 FL — LOW (ref 80–100)
NRBC # BLD: 0 /100 WBCS — SIGNIFICANT CHANGE UP (ref 0–0)
NRBC # BLD: 0 /100 WBCS — SIGNIFICANT CHANGE UP (ref 0–0)
PHOSPHATE SERPL-MCNC: 3.4 MG/DL — SIGNIFICANT CHANGE UP (ref 2.5–4.5)
PLATELET # BLD AUTO: 182 K/UL — SIGNIFICANT CHANGE UP (ref 150–400)
PLATELET # BLD AUTO: 195 K/UL — SIGNIFICANT CHANGE UP (ref 150–400)
POTASSIUM SERPL-MCNC: 4.3 MMOL/L — SIGNIFICANT CHANGE UP (ref 3.5–5.3)
POTASSIUM SERPL-SCNC: 4.3 MMOL/L — SIGNIFICANT CHANGE UP (ref 3.5–5.3)
PROT SERPL-MCNC: 7.8 G/DL — SIGNIFICANT CHANGE UP (ref 6–8.3)
RBC # BLD: 5.01 M/UL — SIGNIFICANT CHANGE UP (ref 3.8–5.2)
RBC # BLD: 5.03 M/UL — SIGNIFICANT CHANGE UP (ref 3.8–5.2)
RBC # FLD: 15.6 % — HIGH (ref 10.3–14.5)
RBC # FLD: 15.8 % — HIGH (ref 10.3–14.5)
SODIUM SERPL-SCNC: 145 MMOL/L — SIGNIFICANT CHANGE UP (ref 135–145)
VALPROATE SERPL-MCNC: 66.9 UG/ML — SIGNIFICANT CHANGE UP (ref 50–100)
WBC # BLD: 6.31 K/UL — SIGNIFICANT CHANGE UP (ref 3.8–10.5)
WBC # BLD: 7.31 K/UL — SIGNIFICANT CHANGE UP (ref 3.8–10.5)
WBC # FLD AUTO: 6.31 K/UL — SIGNIFICANT CHANGE UP (ref 3.8–10.5)
WBC # FLD AUTO: 7.31 K/UL — SIGNIFICANT CHANGE UP (ref 3.8–10.5)

## 2020-10-03 PROCEDURE — 36620 INSERTION CATHETER ARTERY: CPT

## 2020-10-03 PROCEDURE — 99233 SBSQ HOSP IP/OBS HIGH 50: CPT | Mod: CS,GC

## 2020-10-03 RX ORDER — POTASSIUM CHLORIDE 20 MEQ
40 PACKET (EA) ORAL EVERY 4 HOURS
Refills: 0 | Status: COMPLETED | OUTPATIENT
Start: 2020-10-03 | End: 2020-10-04

## 2020-10-03 RX ORDER — REMDESIVIR 5 MG/ML
100 INJECTION INTRAVENOUS EVERY 24 HOURS
Refills: 0 | Status: COMPLETED | OUTPATIENT
Start: 2020-10-04 | End: 2020-10-08

## 2020-10-03 RX ADMIN — ATORVASTATIN CALCIUM 20 MILLIGRAM(S): 80 TABLET, FILM COATED ORAL at 21:34

## 2020-10-03 RX ADMIN — BENZOCAINE AND MENTHOL 1 LOZENGE: 5; 1 LIQUID ORAL at 00:55

## 2020-10-03 RX ADMIN — Medication 6 MILLIGRAM(S): at 21:34

## 2020-10-03 RX ADMIN — POLYETHYLENE GLYCOL 3350 17 GRAM(S): 17 POWDER, FOR SOLUTION ORAL at 12:15

## 2020-10-03 RX ADMIN — Medication 4: at 12:32

## 2020-10-03 RX ADMIN — DIVALPROEX SODIUM 1000 MILLIGRAM(S): 500 TABLET, DELAYED RELEASE ORAL at 21:34

## 2020-10-03 RX ADMIN — Medication 4: at 06:42

## 2020-10-03 RX ADMIN — Medication 2: at 21:34

## 2020-10-03 RX ADMIN — Medication 2.5 MICROGRAM(S)/KG/MIN: at 16:42

## 2020-10-03 RX ADMIN — REMDESIVIR 500 MILLIGRAM(S): 5 INJECTION INTRAVENOUS at 21:49

## 2020-10-03 RX ADMIN — Medication 325 MILLIGRAM(S): at 12:15

## 2020-10-03 RX ADMIN — Medication 1000 UNIT(S): at 16:42

## 2020-10-03 RX ADMIN — ARIPIPRAZOLE 10 MILLIGRAM(S): 15 TABLET ORAL at 12:14

## 2020-10-03 RX ADMIN — ENOXAPARIN SODIUM 40 MILLIGRAM(S): 100 INJECTION SUBCUTANEOUS at 16:42

## 2020-10-03 RX ADMIN — Medication 40 MILLIEQUIVALENT(S): at 23:50

## 2020-10-03 RX ADMIN — Medication 30 MILLIGRAM(S): at 12:15

## 2020-10-03 RX ADMIN — QUETIAPINE FUMARATE 400 MILLIGRAM(S): 200 TABLET, FILM COATED ORAL at 21:34

## 2020-10-03 RX ADMIN — ENOXAPARIN SODIUM 40 MILLIGRAM(S): 100 INJECTION SUBCUTANEOUS at 05:58

## 2020-10-03 RX ADMIN — LIOTHYRONINE SODIUM 25 MICROGRAM(S): 25 TABLET ORAL at 05:58

## 2020-10-03 NOTE — PROGRESS NOTE ADULT - ASSESSMENT
65F w PMHx bipolar disorder, HLD, T2DM, anemia, multiple UTIs on chronic nitrofurantoin, hypothyroidism presenting from nursing home with cough and fever, admitted to [facility] for management of sepsis 2/2 COVID19 PNA vs bacterial PNA.    64yo F with PMH BPD, DM, recurrent UTIs on nitrofurantoin, hypothyroidism, who presented from NH with reported Fever to 104, cough and chills, found to have Hypoxic Respiratory Failure 2/2 COVID PNA for which she was started on NRB->high flow, Antiviral, Decadron and Broad Spectrum ABx. Hospital course notable for New onset presymptomatic Bradycardia.

## 2020-10-03 NOTE — PROGRESS NOTE ADULT - PROBLEM SELECTOR PLAN 2
Pt observed to have HR <50 overnight 9/30 and 10/1. Reported chest discomfort earlier on 10/1 but currently denies chest pain or palpitations. Denies prior history of bradycardia. EKG shows sinus bradycardia. Currently HR b/t 30-40s.   - Cardiology consulted (Dr. Catalan) and recommends no acute interventions at this time  - Per EP consult, consider dobutamine gtt if pt requires atropine   - Will place arterial line for BP monitoring, as dobutamine known to drop BP  - f/u TSH  - f/u CXR Pt observed to have HR <50 overnight 9/30 and 10/1. Reported chest discomfort earlier on 10/1 but currently denies chest pain or palpitations. Denies prior history of bradycardia. EKG shows sinus bradycardia. Currently HR b/t 30-40s. HR improving since initiating dobutamine gtt (10/2). Arterial line placed 10/2.  - c/w dobutamine 2.5mg gtt, per cardiology recs  - if patient become symptomatic and or tachycardic will discontinue dobutamine infusion and alert Cardiology fellow  - will keep Atropine at bedside and Pacer pads on  - f/u TSH  - f/u CXR

## 2020-10-03 NOTE — PROGRESS NOTE ADULT - SUBJECTIVE AND OBJECTIVE BOX
SUBJECTIVE/OVERNIGHT EVENTS:     VITAL SIGNS:  Vital Signs Last 24 Hrs  T(C): 36.6 (03 Oct 2020 08:52), Max: 37.1 (02 Oct 2020 18:13)  T(F): 97.9 (03 Oct 2020 08:52), Max: 98.7 (02 Oct 2020 18:13)  HR: 77 (03 Oct 2020 08:00) (31 - 88)  BP: 142/66 (02 Oct 2020 19:00) (142/66 - 153/72)  BP(mean): --  RR: 29 (03 Oct 2020 08:00) (24 - 29)  SpO2: 90% (03 Oct 2020 08:00) (87% - 95%)    PHYSICAL EXAM:  General: NAD; speaking in full sentences  HEENT: NC/AT; PERRL; EOMI; MMM  Neck: supple; no JVD  Cardiac: RRR; +S1/S2  Pulm: CTA B/L; no W/R/R  GI: soft, NT/ND, +BS  Extremities: WWP; no edema, clubbing or cyanosis  Vasc: 2+ radial, DP pulses B/L  Neuro: AAOx3; no focal deficits    MEDICATIONS:  MEDICATIONS  (STANDING):  ARIPiprazole 10 milliGRAM(s) Oral daily  atorvastatin 20 milliGRAM(s) Oral at bedtime  cholecalciferol 1000 Unit(s) Oral daily  dexAMETHasone  Injectable 6 milliGRAM(s) IV Push every 24 hours  dextrose 5%. 1000 milliLiter(s) (50 mL/Hr) IV Continuous <Continuous>  dextrose 50% Injectable 12.5 Gram(s) IV Push once  dextrose 50% Injectable 25 Gram(s) IV Push once  dextrose 50% Injectable 25 Gram(s) IV Push once  diVALproex ER 1000 milliGRAM(s) Oral at bedtime  DOBUTamine Infusion 1.475 MICROgram(s)/kG/Min (2.5 mL/Hr) IV Continuous <Continuous>  enoxaparin Injectable 40 milliGRAM(s) SubCutaneous every 12 hours  ferrous    sulfate 325 milliGRAM(s) Oral daily  influenza  Vaccine (HIGH DOSE) 0.7 milliLiter(s) IntraMuscular once  insulin lispro (HumaLOG) corrective regimen sliding scale   SubCutaneous Before meals and at bedtime  liothyronine 25 MICROGram(s) Oral daily  PARoxetine 30 milliGRAM(s) Oral daily  polyethylene glycol 3350 17 Gram(s) Oral every 24 hours  QUEtiapine 400 milliGRAM(s) Oral at bedtime  remdesivir  IVPB   IV Intermittent   remdesivir  IVPB 100 milliGRAM(s) IV Intermittent every 24 hours    MEDICATIONS  (PRN):  benzocaine 15 mG/menthol 3.6 mG (Sugar-Free) Lozenge 1 Lozenge Oral two times a day PRN Sore Throat  dextrose 40% Gel 15 Gram(s) Oral once PRN Blood Glucose LESS THAN 70 milliGRAM(s)/deciliter  glucagon  Injectable 1 milliGRAM(s) IntraMuscular once PRN Glucose LESS THAN 70 milligrams/deciliter      ALLERGIES:  Allergies    No Known Allergies    Intolerances        LABS:                        12.4   6.31  )-----------( 182      ( 03 Oct 2020 06:37 )             39.9     10-03    145  |  106  |  22  ----------------------------<  271<H>  4.3   |  26  |  0.66    Ca    9.6      03 Oct 2020 06:37  Phos  3.4     10-03  Mg     2.0     10-03    TPro  7.8  /  Alb  3.9  /  TBili  0.2  /  DBili  x   /  AST  15  /  ALT  15  /  AlkPhos  77  10-03        RADIOLOGY & ADDITIONAL TESTS: Reviewed. SUBJECTIVE/OVERNIGHT EVENTS: No acute events overnight. Pt seen in AM at bedside, resting in bed, breathing on high flow 50/50, and doesn't appear to be in any acute respiratory distress. Pt denies any active concerns or complaints.    VITAL SIGNS:  Vital Signs Last 24 Hrs  T(C): 36.6 (03 Oct 2020 08:52), Max: 37.1 (02 Oct 2020 18:13)  T(F): 97.9 (03 Oct 2020 08:52), Max: 98.7 (02 Oct 2020 18:13)  HR: 77 (03 Oct 2020 08:00) (31 - 88)  BP: 142/66 (02 Oct 2020 19:00) (142/66 - 153/72)  BP(mean): --  RR: 29 (03 Oct 2020 08:00) (24 - 29)  SpO2: 90% (03 Oct 2020 08:00) (87% - 95%)    PHYSICAL EXAM:  General: NAD; speaking in full sentences  HEENT: NC/AT; PERRL; EOMI; MMM  Neck: supple; no JVD  Cardiac: +bradycardia; +S1/S2  Pulm: +breathing on high flow NC; otherwise CTA B/L; no W/R/R  GI: soft, NT/ND, +BS  Extremities: WWP; no edema, clubbing or cyanosis  Vasc: 2+ radial, DP pulses B/L  Neuro: AAOx3; no focal deficits    MEDICATIONS:  MEDICATIONS  (STANDING):  ARIPiprazole 10 milliGRAM(s) Oral daily  atorvastatin 20 milliGRAM(s) Oral at bedtime  cholecalciferol 1000 Unit(s) Oral daily  dexAMETHasone  Injectable 6 milliGRAM(s) IV Push every 24 hours  dextrose 5%. 1000 milliLiter(s) (50 mL/Hr) IV Continuous <Continuous>  dextrose 50% Injectable 12.5 Gram(s) IV Push once  dextrose 50% Injectable 25 Gram(s) IV Push once  dextrose 50% Injectable 25 Gram(s) IV Push once  diVALproex ER 1000 milliGRAM(s) Oral at bedtime  DOBUTamine Infusion 1.475 MICROgram(s)/kG/Min (2.5 mL/Hr) IV Continuous <Continuous>  enoxaparin Injectable 40 milliGRAM(s) SubCutaneous every 12 hours  ferrous    sulfate 325 milliGRAM(s) Oral daily  influenza  Vaccine (HIGH DOSE) 0.7 milliLiter(s) IntraMuscular once  insulin lispro (HumaLOG) corrective regimen sliding scale   SubCutaneous Before meals and at bedtime  liothyronine 25 MICROGram(s) Oral daily  PARoxetine 30 milliGRAM(s) Oral daily  polyethylene glycol 3350 17 Gram(s) Oral every 24 hours  QUEtiapine 400 milliGRAM(s) Oral at bedtime  remdesivir  IVPB   IV Intermittent   remdesivir  IVPB 100 milliGRAM(s) IV Intermittent every 24 hours    MEDICATIONS  (PRN):  benzocaine 15 mG/menthol 3.6 mG (Sugar-Free) Lozenge 1 Lozenge Oral two times a day PRN Sore Throat  dextrose 40% Gel 15 Gram(s) Oral once PRN Blood Glucose LESS THAN 70 milliGRAM(s)/deciliter  glucagon  Injectable 1 milliGRAM(s) IntraMuscular once PRN Glucose LESS THAN 70 milligrams/deciliter      ALLERGIES:  Allergies    No Known Allergies    Intolerances        LABS:                        12.4   6.31  )-----------( 182      ( 03 Oct 2020 06:37 )             39.9     10-03    145  |  106  |  22  ----------------------------<  271<H>  4.3   |  26  |  0.66    Ca    9.6      03 Oct 2020 06:37  Phos  3.4     10-03  Mg     2.0     10-03    TPro  7.8  /  Alb  3.9  /  TBili  0.2  /  DBili  x   /  AST  15  /  ALT  15  /  AlkPhos  77  10-03        RADIOLOGY & ADDITIONAL TESTS: Reviewed.

## 2020-10-03 NOTE — PROGRESS NOTE ADULT - ASSESSMENT
65 F with PMH BPD, DM, recurrent UTIs on nitrofurantoin, hypothyroidism, who presented from NH with reported Fever to 104, cough and chills, found to have Hypoxic Respiratory Failure 2/2 COVID PNA for which she was started on NRB, Antiviral, Decadron and Broad Spectrum ABx. Hospital course notable for New onset presymptomatic Bradycardia, for which cardiology and CCU initially consulted.     1) Marked Sinus Bradycardia in Asymptomatic Patient  -Clinically patient is asymptomatic without lightheadedness dizziness, Nausea. She has chronotropic response with HR picking up to the low 50's with movement  -Clinically she is warm, and labs without evidence of poor fwd flow: Preserved renal and Hepatic function  -Tele, EKG and vitals reviewed. When patient was first admitted with a T Max of 104 her pulse was 'low' in the 80's. Trends are now 30-40's in sinus, without evidence of Block or pause, with appropriate  compensatory SBP in the 130's-140's. Additionally medication list reviewed. Patient has been on Decadron IV since hospitalization. Cardiac arrhythmias have been reported in patient on IV steroids irrespective of dose as bradyarrhythmia associated with parenteral administration has been documented more commonly than with oral steroids. Lastly patient has known Hypothyroidism. Thyroid dysfunction can contribute to worsening bradycardia.   - given its persistence in patient with COVID PNA, recommend starting Dobutamine 2.5 with goal to slowly uptitrate to 5 and monitor HR response. SHould patient become symptomatic and or tachycardic would DC the infusion and alert Cardiology fellow; HR improved since initiating of dobutamine   -Recommend escalating care to MICU for closer monitoring.   -Keep Atropine at bedside and Pacer pads on    Pending discussion with cardiology attending.   Lars Richardson MD 65 F with PMH BPD, DM, recurrent UTIs on nitrofurantoin, hypothyroidism, who presented from NH with reported Fever to 104, cough and chills, found to have Hypoxic Respiratory Failure 2/2 COVID PNA for which she was started on NRB, Antiviral, Decadron and Broad Spectrum ABx. Hospital course notable for New onset presymptomatic Bradycardia, for which cardiology and CCU initially consulted.     1) Marked Sinus Bradycardia in Asymptomatic Patient  -Clinically patient is asymptomatic without lightheadedness dizziness, Nausea. She has chronotropic response with HR picking up to the low 50's with movement  -Clinically she is warm, and labs without evidence of poor fwd flow: Preserved renal and Hepatic function  -Tele, EKG and vitals reviewed. When patient was first admitted with a T Max of 104 her pulse was 'low' in the 80's. Trends are now 30-40's in sinus, without evidence of Block or pause, with appropriate  compensatory SBP in the 130's-140's. Additionally medication list reviewed. Patient has been on Decadron IV since hospitalization. Cardiac arrhythmias have been reported in patient on IV steroids irrespective of dose as bradyarrhythmia associated with parenteral administration has been documented more commonly than with oral steroids. Lastly patient has known Hypothyroidism. Thyroid dysfunction can contribute to worsening bradycardia.   - given its persistence in patient with COVID PNA, recommend continuing with Dobutamine 2.5 gtt. Should patient become symptomatic and or tachycardic would DC the infusion and alert Cardiology fellow; HR improved since initiating of dobutamine   -Keep Atropine at bedside and Pacer pads on    Case discussed with cardiology attending.   Lars Richardson MD

## 2020-10-03 NOTE — PROGRESS NOTE ADULT - PROBLEM SELECTOR PLAN 5
Pt known to have hypothyroidism. Observed to be bradycardic on 10/1 with mild chest discomfort.  - Repeat TSH, T4  - c/w liothyronine 25mcg (home med)

## 2020-10-03 NOTE — PROGRESS NOTE ADULT - SUBJECTIVE AND OBJECTIVE BOX
INTERVAL EVENTS:    PAST MEDICAL & SURGICAL HISTORY:  Bipolar disorder    Hypothyroid    T2DM (type 2 diabetes mellitus)        MEDICATIONS  (STANDING):  ARIPiprazole 10 milliGRAM(s) Oral daily  atorvastatin 20 milliGRAM(s) Oral at bedtime  cholecalciferol 1000 Unit(s) Oral daily  dexAMETHasone  Injectable 6 milliGRAM(s) IV Push every 24 hours  dextrose 5%. 1000 milliLiter(s) (50 mL/Hr) IV Continuous <Continuous>  dextrose 50% Injectable 12.5 Gram(s) IV Push once  dextrose 50% Injectable 25 Gram(s) IV Push once  dextrose 50% Injectable 25 Gram(s) IV Push once  diVALproex ER 1000 milliGRAM(s) Oral at bedtime  DOBUTamine Infusion 1.475 MICROgram(s)/kG/Min (2.5 mL/Hr) IV Continuous <Continuous>  enoxaparin Injectable 40 milliGRAM(s) SubCutaneous every 12 hours  ferrous    sulfate 325 milliGRAM(s) Oral daily  influenza  Vaccine (HIGH DOSE) 0.7 milliLiter(s) IntraMuscular once  insulin lispro (HumaLOG) corrective regimen sliding scale   SubCutaneous Before meals and at bedtime  liothyronine 25 MICROGram(s) Oral daily  PARoxetine 30 milliGRAM(s) Oral daily  polyethylene glycol 3350 17 Gram(s) Oral every 24 hours  QUEtiapine 400 milliGRAM(s) Oral at bedtime  remdesivir  IVPB   IV Intermittent   remdesivir  IVPB 100 milliGRAM(s) IV Intermittent every 24 hours    MEDICATIONS  (PRN):  benzocaine 15 mG/menthol 3.6 mG (Sugar-Free) Lozenge 1 Lozenge Oral two times a day PRN Sore Throat  dextrose 40% Gel 15 Gram(s) Oral once PRN Blood Glucose LESS THAN 70 milliGRAM(s)/deciliter  glucagon  Injectable 1 milliGRAM(s) IntraMuscular once PRN Glucose LESS THAN 70 milligrams/deciliter    Vital Signs Last 24 Hrs  T(C): 36.6 (03 Oct 2020 08:52), Max: 37.1 (02 Oct 2020 18:13)  T(F): 97.9 (03 Oct 2020 08:52), Max: 98.7 (02 Oct 2020 18:13)  HR: 77 (03 Oct 2020 08:00) (31 - 88)  BP: 142/66 (02 Oct 2020 19:00) (142/66 - 153/72)  BP(mean): --  RR: 29 (03 Oct 2020 08:00) (24 - 29)  SpO2: 90% (03 Oct 2020 08:00) (87% - 95%)    PHYSICAL EXAM:  GEN: NAD  HEENT: EOMI   RESP: CTA b/l  CV: RRR. Normal S1/S2. No m/r/g.  ABD: soft, non-distended  EXT: No edema   NEURO: alert and attentive    LABS:                        12.4   6.31  )-----------( 182      ( 03 Oct 2020 06:37 )             39.9     10-03    145  |  106  |  22  ----------------------------<  271<H>  4.3   |  26  |  0.66    Ca    9.6      03 Oct 2020 06:37  Phos  3.4     10-03  Mg     2.0     10-03    TPro  7.8  /  Alb  3.9  /  TBili  0.2  /  DBili  x   /  AST  15  /  ALT  15  /  AlkPhos  77  10-03            I&O's Summary    02 Oct 2020 07:01  -  03 Oct 2020 07:00  --------------------------------------------------------  IN: 296.2 mL / OUT: 880 mL / NET: -583.8 mL    03 Oct 2020 07:01  -  03 Oct 2020 08:53  --------------------------------------------------------  IN: 4.2 mL / OUT: 0 mL / NET: 4.2 mL               INTERVAL EVENTS: CODEY    PAST MEDICAL & SURGICAL HISTORY:  Bipolar disorder    Hypothyroid    T2DM (type 2 diabetes mellitus)        MEDICATIONS  (STANDING):  ARIPiprazole 10 milliGRAM(s) Oral daily  atorvastatin 20 milliGRAM(s) Oral at bedtime  cholecalciferol 1000 Unit(s) Oral daily  dexAMETHasone  Injectable 6 milliGRAM(s) IV Push every 24 hours  dextrose 5%. 1000 milliLiter(s) (50 mL/Hr) IV Continuous <Continuous>  dextrose 50% Injectable 12.5 Gram(s) IV Push once  dextrose 50% Injectable 25 Gram(s) IV Push once  dextrose 50% Injectable 25 Gram(s) IV Push once  diVALproex ER 1000 milliGRAM(s) Oral at bedtime  DOBUTamine Infusion 1.475 MICROgram(s)/kG/Min (2.5 mL/Hr) IV Continuous <Continuous>  enoxaparin Injectable 40 milliGRAM(s) SubCutaneous every 12 hours  ferrous    sulfate 325 milliGRAM(s) Oral daily  influenza  Vaccine (HIGH DOSE) 0.7 milliLiter(s) IntraMuscular once  insulin lispro (HumaLOG) corrective regimen sliding scale   SubCutaneous Before meals and at bedtime  liothyronine 25 MICROGram(s) Oral daily  PARoxetine 30 milliGRAM(s) Oral daily  polyethylene glycol 3350 17 Gram(s) Oral every 24 hours  QUEtiapine 400 milliGRAM(s) Oral at bedtime  remdesivir  IVPB   IV Intermittent   remdesivir  IVPB 100 milliGRAM(s) IV Intermittent every 24 hours    MEDICATIONS  (PRN):  benzocaine 15 mG/menthol 3.6 mG (Sugar-Free) Lozenge 1 Lozenge Oral two times a day PRN Sore Throat  dextrose 40% Gel 15 Gram(s) Oral once PRN Blood Glucose LESS THAN 70 milliGRAM(s)/deciliter  glucagon  Injectable 1 milliGRAM(s) IntraMuscular once PRN Glucose LESS THAN 70 milligrams/deciliter    Vital Signs Last 24 Hrs  T(C): 36.6 (03 Oct 2020 08:52), Max: 37.1 (02 Oct 2020 18:13)  T(F): 97.9 (03 Oct 2020 08:52), Max: 98.7 (02 Oct 2020 18:13)  HR: 77 (03 Oct 2020 08:00) (31 - 88)  BP: 142/66 (02 Oct 2020 19:00) (142/66 - 153/72)  BP(mean): --  RR: 29 (03 Oct 2020 08:00) (24 - 29)  SpO2: 90% (03 Oct 2020 08:00) (87% - 95%)    PHYSICAL EXAM:  In an effort to limit contact iso COVID+, did not examine the patient.     LABS:                        12.4   6.31  )-----------( 182      ( 03 Oct 2020 06:37 )             39.9     10-03    145  |  106  |  22  ----------------------------<  271<H>  4.3   |  26  |  0.66    Ca    9.6      03 Oct 2020 06:37  Phos  3.4     10-03  Mg     2.0     10-03    TPro  7.8  /  Alb  3.9  /  TBili  0.2  /  DBili  x   /  AST  15  /  ALT  15  /  AlkPhos  77  10-03            I&O's Summary    02 Oct 2020 07:01  -  03 Oct 2020 07:00  --------------------------------------------------------  IN: 296.2 mL / OUT: 880 mL / NET: -583.8 mL    03 Oct 2020 07:01  -  03 Oct 2020 08:53  --------------------------------------------------------  IN: 4.2 mL / OUT: 0 mL / NET: 4.2 mL

## 2020-10-03 NOTE — PROGRESS NOTE ADULT - PROBLEM SELECTOR PLAN 3
Pt COVID postive x2,  presenting with fever of 101.4F in ED on 9/29  -contact/droplet isolation precautions  -c/w 15L NRB, wean back to 10L as tolerated (see above problem)   -c/w Remdesivir 200 mg x1, then 100 mg x 4 days (last day 10/3 @10 PM)  -c/w Decadron IV 6mg daily (first dose 9/30 12 AM)  -f/u daily COVID labs- CBC w/ diff, CMP, ferritin, CRP, D-dimer, Mg, Phos  -pt reported chest pain on 10/1 AM- f/u EKG Pt COVID postive x2,  presenting with fever of 101.4F in ED on 9/29  -contact/droplet isolation precautions  -c/w HF as tolerated (see above problem)   -c/w Remdesivir 200 mg x1, then 100 mg x 4 days (last day 10/3 @10 PM)  -c/w Decadron IV 6mg daily (first dose 9/30 12 AM)  -f/u daily COVID labs- CBC w/ diff, CMP, ferritin, CRP, D-dimer, Mg, Phos  -pt reported chest pain on 10/1 AM- f/u EKG

## 2020-10-04 LAB
ALBUMIN SERPL ELPH-MCNC: 3.7 G/DL — SIGNIFICANT CHANGE UP (ref 3.3–5)
ALP SERPL-CCNC: 76 U/L — SIGNIFICANT CHANGE UP (ref 40–120)
ALT FLD-CCNC: 11 U/L — SIGNIFICANT CHANGE UP (ref 10–45)
ANION GAP SERPL CALC-SCNC: 12 MMOL/L — SIGNIFICANT CHANGE UP (ref 5–17)
AST SERPL-CCNC: 12 U/L — SIGNIFICANT CHANGE UP (ref 10–40)
BASOPHILS # BLD AUTO: 0 K/UL — SIGNIFICANT CHANGE UP (ref 0–0.2)
BASOPHILS NFR BLD AUTO: 0 % — SIGNIFICANT CHANGE UP (ref 0–2)
BILIRUB SERPL-MCNC: 0.3 MG/DL — SIGNIFICANT CHANGE UP (ref 0.2–1.2)
BUN SERPL-MCNC: 22 MG/DL — SIGNIFICANT CHANGE UP (ref 7–23)
CALCIUM SERPL-MCNC: 9.8 MG/DL — SIGNIFICANT CHANGE UP (ref 8.4–10.5)
CHLORIDE SERPL-SCNC: 102 MMOL/L — SIGNIFICANT CHANGE UP (ref 96–108)
CO2 SERPL-SCNC: 27 MMOL/L — SIGNIFICANT CHANGE UP (ref 22–31)
CREAT SERPL-MCNC: 0.75 MG/DL — SIGNIFICANT CHANGE UP (ref 0.5–1.3)
CRP SERPL-MCNC: 1.83 MG/DL — HIGH (ref 0–0.4)
CULTURE RESULTS: SIGNIFICANT CHANGE UP
CULTURE RESULTS: SIGNIFICANT CHANGE UP
D DIMER BLD IA.RAPID-MCNC: 2644 NG/ML DDU — HIGH
EOSINOPHIL # BLD AUTO: 0 K/UL — SIGNIFICANT CHANGE UP (ref 0–0.5)
EOSINOPHIL NFR BLD AUTO: 0 % — SIGNIFICANT CHANGE UP (ref 0–6)
FERRITIN SERPL-MCNC: 217 NG/ML — HIGH (ref 15–150)
GAS PNL BLDA: SIGNIFICANT CHANGE UP
GLUCOSE BLDC GLUCOMTR-MCNC: 133 MG/DL — HIGH (ref 70–99)
GLUCOSE BLDC GLUCOMTR-MCNC: 166 MG/DL — HIGH (ref 70–99)
GLUCOSE BLDC GLUCOMTR-MCNC: 276 MG/DL — HIGH (ref 70–99)
GLUCOSE BLDC GLUCOMTR-MCNC: 279 MG/DL — HIGH (ref 70–99)
GLUCOSE SERPL-MCNC: 298 MG/DL — HIGH (ref 70–99)
HCT VFR BLD CALC: 38.9 % — SIGNIFICANT CHANGE UP (ref 34.5–45)
HGB BLD-MCNC: 12.1 G/DL — SIGNIFICANT CHANGE UP (ref 11.5–15.5)
IMM GRANULOCYTES NFR BLD AUTO: 1.3 % — SIGNIFICANT CHANGE UP (ref 0–1.5)
LYMPHOCYTES # BLD AUTO: 0.77 K/UL — LOW (ref 1–3.3)
LYMPHOCYTES # BLD AUTO: 14.7 % — SIGNIFICANT CHANGE UP (ref 13–44)
MAGNESIUM SERPL-MCNC: 2 MG/DL — SIGNIFICANT CHANGE UP (ref 1.6–2.6)
MCHC RBC-ENTMCNC: 24.3 PG — LOW (ref 27–34)
MCHC RBC-ENTMCNC: 31.1 GM/DL — LOW (ref 32–36)
MCV RBC AUTO: 78.3 FL — LOW (ref 80–100)
MONOCYTES # BLD AUTO: 0.14 K/UL — SIGNIFICANT CHANGE UP (ref 0–0.9)
MONOCYTES NFR BLD AUTO: 2.7 % — SIGNIFICANT CHANGE UP (ref 2–14)
NEUTROPHILS # BLD AUTO: 4.25 K/UL — SIGNIFICANT CHANGE UP (ref 1.8–7.4)
NEUTROPHILS NFR BLD AUTO: 81.3 % — HIGH (ref 43–77)
NRBC # BLD: 0 /100 WBCS — SIGNIFICANT CHANGE UP (ref 0–0)
PHOSPHATE SERPL-MCNC: 3.7 MG/DL — SIGNIFICANT CHANGE UP (ref 2.5–4.5)
PLATELET # BLD AUTO: 178 K/UL — SIGNIFICANT CHANGE UP (ref 150–400)
POTASSIUM SERPL-MCNC: 4.7 MMOL/L — SIGNIFICANT CHANGE UP (ref 3.5–5.3)
POTASSIUM SERPL-SCNC: 4.7 MMOL/L — SIGNIFICANT CHANGE UP (ref 3.5–5.3)
PROT SERPL-MCNC: 7.4 G/DL — SIGNIFICANT CHANGE UP (ref 6–8.3)
RBC # BLD: 4.97 M/UL — SIGNIFICANT CHANGE UP (ref 3.8–5.2)
RBC # FLD: 15.7 % — HIGH (ref 10.3–14.5)
SODIUM SERPL-SCNC: 141 MMOL/L — SIGNIFICANT CHANGE UP (ref 135–145)
SPECIMEN SOURCE: SIGNIFICANT CHANGE UP
SPECIMEN SOURCE: SIGNIFICANT CHANGE UP
WBC # BLD: 5.23 K/UL — SIGNIFICANT CHANGE UP (ref 3.8–10.5)
WBC # FLD AUTO: 5.23 K/UL — SIGNIFICANT CHANGE UP (ref 3.8–10.5)

## 2020-10-04 PROCEDURE — 71045 X-RAY EXAM CHEST 1 VIEW: CPT | Mod: 26

## 2020-10-04 PROCEDURE — 99233 SBSQ HOSP IP/OBS HIGH 50: CPT | Mod: CS,GC

## 2020-10-04 RX ORDER — DOBUTAMINE HCL 250MG/20ML
2 VIAL (ML) INTRAVENOUS
Qty: 1000 | Refills: 0 | Status: DISCONTINUED | OUTPATIENT
Start: 2020-10-04 | End: 2020-10-04

## 2020-10-04 RX ORDER — PANTOPRAZOLE SODIUM 20 MG/1
40 TABLET, DELAYED RELEASE ORAL
Refills: 0 | Status: DISCONTINUED | OUTPATIENT
Start: 2020-10-04 | End: 2020-10-14

## 2020-10-04 RX ORDER — DOBUTAMINE HCL 250MG/20ML
0.89 VIAL (ML) INTRAVENOUS
Qty: 1000 | Refills: 0 | Status: DISCONTINUED | OUTPATIENT
Start: 2020-10-04 | End: 2020-10-05

## 2020-10-04 RX ORDER — INSULIN GLARGINE 100 [IU]/ML
6 INJECTION, SOLUTION SUBCUTANEOUS AT BEDTIME
Refills: 0 | Status: DISCONTINUED | OUTPATIENT
Start: 2020-10-04 | End: 2020-10-05

## 2020-10-04 RX ADMIN — ARIPIPRAZOLE 10 MILLIGRAM(S): 15 TABLET ORAL at 13:27

## 2020-10-04 RX ADMIN — QUETIAPINE FUMARATE 400 MILLIGRAM(S): 200 TABLET, FILM COATED ORAL at 22:25

## 2020-10-04 RX ADMIN — Medication 1000 UNIT(S): at 13:27

## 2020-10-04 RX ADMIN — Medication 6: at 06:06

## 2020-10-04 RX ADMIN — ENOXAPARIN SODIUM 40 MILLIGRAM(S): 100 INJECTION SUBCUTANEOUS at 18:45

## 2020-10-04 RX ADMIN — POLYETHYLENE GLYCOL 3350 17 GRAM(S): 17 POWDER, FOR SOLUTION ORAL at 13:28

## 2020-10-04 RX ADMIN — Medication 40 MILLIEQUIVALENT(S): at 05:39

## 2020-10-04 RX ADMIN — Medication 2: at 18:59

## 2020-10-04 RX ADMIN — DIVALPROEX SODIUM 1000 MILLIGRAM(S): 500 TABLET, DELAYED RELEASE ORAL at 22:25

## 2020-10-04 RX ADMIN — LIOTHYRONINE SODIUM 25 MICROGRAM(S): 25 TABLET ORAL at 05:39

## 2020-10-04 RX ADMIN — INSULIN GLARGINE 6 UNIT(S): 100 INJECTION, SOLUTION SUBCUTANEOUS at 22:25

## 2020-10-04 RX ADMIN — Medication 325 MILLIGRAM(S): at 13:28

## 2020-10-04 RX ADMIN — ATORVASTATIN CALCIUM 20 MILLIGRAM(S): 80 TABLET, FILM COATED ORAL at 22:25

## 2020-10-04 RX ADMIN — Medication 6: at 13:25

## 2020-10-04 RX ADMIN — Medication 40 MILLIEQUIVALENT(S): at 02:11

## 2020-10-04 RX ADMIN — PANTOPRAZOLE SODIUM 40 MILLIGRAM(S): 20 TABLET, DELAYED RELEASE ORAL at 08:56

## 2020-10-04 RX ADMIN — ENOXAPARIN SODIUM 40 MILLIGRAM(S): 100 INJECTION SUBCUTANEOUS at 05:38

## 2020-10-04 RX ADMIN — Medication 30 MILLIGRAM(S): at 13:24

## 2020-10-04 RX ADMIN — Medication 6 MILLIGRAM(S): at 22:25

## 2020-10-04 RX ADMIN — REMDESIVIR 500 MILLIGRAM(S): 5 INJECTION INTRAVENOUS at 22:25

## 2020-10-04 NOTE — PROGRESS NOTE ADULT - ASSESSMENT
64yo F with PMH BPD, DM, recurrent UTIs on nitrofurantoin, hypothyroidism, who presented from NH with reported Fever to 104, cough and chills, found to have Hypoxic Respiratory Failure 2/2 COVID PNA for which she was started on NRB->high flow, Antiviral, Decadron and Broad Spectrum ABx. Hospital course notable for New onset presymptomatic Bradycardia.

## 2020-10-04 NOTE — PROGRESS NOTE ADULT - PROBLEM SELECTOR PLAN 1
Pt presented with dyspnea and was found to be hypoxemic requiring NRB supplemental oxygen. Since 10/1, she has been on NRB 10L with SpO2 92-94% and denies any worsening of her dyspnea/cough today 10/2. SpO2 noted to decrease to 85% on 10L NRB. Patient currently on HFNC 50/50  - f/u ABG   - continue to monitor respiratory status

## 2020-10-04 NOTE — PROGRESS NOTE ADULT - PROBLEM SELECTOR PLAN 3
Pt COVID postive x2,  presenting with fever of 101.4F in ED on 9/29  -contact/droplet isolation precautions  -c/w HF as tolerated   -c/w Remdesivir 200 mg x1, then 100 mg x 4 days (last day 10/8)  -c/w Decadron IV 6mg daily (first dose 9/30 12 AM)  -f/u daily COVID labs- CBC w/ diff, CMP, ferritin, CRP, D-dimer, Mg, Phos

## 2020-10-04 NOTE — PROGRESS NOTE ADULT - SUBJECTIVE AND OBJECTIVE BOX
OVERNIGHT EVENTS:    SUBJECTIVE / INTERVAL HPI: Patient seen and examined at bedside.     VITAL SIGNS:  Vital Signs Last 24 Hrs  T(C): 36.3 (04 Oct 2020 09:00), Max: 36.4 (03 Oct 2020 21:36)  T(F): 97.4 (04 Oct 2020 09:00), Max: 97.6 (03 Oct 2020 21:36)  HR: 67 (04 Oct 2020 09:02) (46 - 93)  BP: 104/59 (03 Oct 2020 23:15) (85/46 - 104/59)  BP(mean): 77 (03 Oct 2020 23:15) (60 - 77)  RR: 29 (04 Oct 2020 09:02) (25 - 32)  SpO2: 93% (04 Oct 2020 09:02) (90% - 94%)    PHYSICAL EXAM:    General: WDWN  HEENT: NC/AT; PERRL, anicteric sclera; MMM  Neck: supple  Cardiovascular: +S1/S2; RRR  Respiratory: CTA B/L; no W/R/R  Gastrointestinal: soft, NT/ND; +BSx4  Extremities: WWP; no edema, clubbing or cyanosis  Vascular: 2+ radial, DP/PT pulses B/L  Neurological: AAOx3; no focal deficits    MEDICATIONS:  MEDICATIONS  (STANDING):  ARIPiprazole 10 milliGRAM(s) Oral daily  atorvastatin 20 milliGRAM(s) Oral at bedtime  cholecalciferol 1000 Unit(s) Oral daily  dexAMETHasone  Injectable 6 milliGRAM(s) IV Push every 24 hours  dextrose 5%. 1000 milliLiter(s) (50 mL/Hr) IV Continuous <Continuous>  dextrose 50% Injectable 12.5 Gram(s) IV Push once  dextrose 50% Injectable 25 Gram(s) IV Push once  dextrose 50% Injectable 25 Gram(s) IV Push once  diVALproex ER 1000 milliGRAM(s) Oral at bedtime  DOBUTamine Infusion 1.77 MICROgram(s)/kG/Min (3 mL/Hr) IV Continuous <Continuous>  enoxaparin Injectable 40 milliGRAM(s) SubCutaneous every 12 hours  ferrous    sulfate 325 milliGRAM(s) Oral daily  influenza  Vaccine (HIGH DOSE) 0.7 milliLiter(s) IntraMuscular once  insulin glargine Injectable (LANTUS) 6 Unit(s) SubCutaneous at bedtime  insulin lispro (HumaLOG) corrective regimen sliding scale   SubCutaneous Before meals and at bedtime  liothyronine 25 MICROGram(s) Oral daily  pantoprazole    Tablet 40 milliGRAM(s) Oral before breakfast  PARoxetine 30 milliGRAM(s) Oral daily  polyethylene glycol 3350 17 Gram(s) Oral every 24 hours  QUEtiapine 400 milliGRAM(s) Oral at bedtime  remdesivir  IVPB 100 milliGRAM(s) IV Intermittent every 24 hours    MEDICATIONS  (PRN):  benzocaine 15 mG/menthol 3.6 mG (Sugar-Free) Lozenge 1 Lozenge Oral two times a day PRN Sore Throat  dextrose 40% Gel 15 Gram(s) Oral once PRN Blood Glucose LESS THAN 70 milliGRAM(s)/deciliter  glucagon  Injectable 1 milliGRAM(s) IntraMuscular once PRN Glucose LESS THAN 70 milligrams/deciliter      ALLERGIES:  Allergies    No Known Allergies    Intolerances        LABS:                        12.1   5.23  )-----------( 178      ( 04 Oct 2020 06:01 )             38.9     10-04    141  |  102  |  22  ----------------------------<  298<H>  4.7   |  27  |  0.75    Ca    9.8      04 Oct 2020 06:01  Phos  3.7     10-04  Mg     2.0     10-04    TPro  7.4  /  Alb  3.7  /  TBili  0.3  /  DBili  x   /  AST  12  /  ALT  11  /  AlkPhos  76  10-04        CAPILLARY BLOOD GLUCOSE      POCT Blood Glucose.: 276 mg/dL (04 Oct 2020 05:49)      RADIOLOGY & ADDITIONAL TESTS: Reviewed.    ASSESSMENT:    PLAN: OVERNIGHT EVENTS: sys were in 60s, dobutamine gtt increased to 2mcg/kg/min from 1.5 (goal is 2.5 per cardiology fellow). FiO2 increased to 75%.     SUBJECTIVE / INTERVAL HPI: Patient seen and examined at bedside. No acute events overnight. Pt resting in bed, breathing on high flow and doesn't appear to be in any acute respiratory distress.       VITAL SIGNS:  Vital Signs Last 24 Hrs  T(C): 36.3 (04 Oct 2020 09:00), Max: 36.4 (03 Oct 2020 21:36)  T(F): 97.4 (04 Oct 2020 09:00), Max: 97.6 (03 Oct 2020 21:36)  HR: 67 (04 Oct 2020 09:02) (46 - 93)  BP: 104/59 (03 Oct 2020 23:15) (85/46 - 104/59)  BP(mean): 77 (03 Oct 2020 23:15) (60 - 77)  RR: 29 (04 Oct 2020 09:02) (25 - 32)  SpO2: 93% (04 Oct 2020 09:02) (90% - 94%)    PHYSICAL EXAM:    General: NAD; speaking in full sentences  HEENT: NC/AT; PERRL; EOMI; MMM  Neck: supple; no JVD  Cardiac: +bradycardia; +S1/S2  Pulm: +breathing on high flow NC; otherwise CTA B/L; no W/R/R  GI: soft, NT/ND, +BS  Extremities: WWP; no edema, clubbing or cyanosis  Vasc: 2+ radial, DP pulses B/L  Neuro: AAOx3; no focal deficits    MEDICATIONS:  MEDICATIONS  (STANDING):  ARIPiprazole 10 milliGRAM(s) Oral daily  atorvastatin 20 milliGRAM(s) Oral at bedtime  cholecalciferol 1000 Unit(s) Oral daily  dexAMETHasone  Injectable 6 milliGRAM(s) IV Push every 24 hours  dextrose 5%. 1000 milliLiter(s) (50 mL/Hr) IV Continuous <Continuous>  dextrose 50% Injectable 12.5 Gram(s) IV Push once  dextrose 50% Injectable 25 Gram(s) IV Push once  dextrose 50% Injectable 25 Gram(s) IV Push once  diVALproex ER 1000 milliGRAM(s) Oral at bedtime  DOBUTamine Infusion 1.77 MICROgram(s)/kG/Min (3 mL/Hr) IV Continuous <Continuous>  enoxaparin Injectable 40 milliGRAM(s) SubCutaneous every 12 hours  ferrous    sulfate 325 milliGRAM(s) Oral daily  influenza  Vaccine (HIGH DOSE) 0.7 milliLiter(s) IntraMuscular once  insulin glargine Injectable (LANTUS) 6 Unit(s) SubCutaneous at bedtime  insulin lispro (HumaLOG) corrective regimen sliding scale   SubCutaneous Before meals and at bedtime  liothyronine 25 MICROGram(s) Oral daily  pantoprazole    Tablet 40 milliGRAM(s) Oral before breakfast  PARoxetine 30 milliGRAM(s) Oral daily  polyethylene glycol 3350 17 Gram(s) Oral every 24 hours  QUEtiapine 400 milliGRAM(s) Oral at bedtime  remdesivir  IVPB 100 milliGRAM(s) IV Intermittent every 24 hours    MEDICATIONS  (PRN):  benzocaine 15 mG/menthol 3.6 mG (Sugar-Free) Lozenge 1 Lozenge Oral two times a day PRN Sore Throat  dextrose 40% Gel 15 Gram(s) Oral once PRN Blood Glucose LESS THAN 70 milliGRAM(s)/deciliter  glucagon  Injectable 1 milliGRAM(s) IntraMuscular once PRN Glucose LESS THAN 70 milligrams/deciliter      ALLERGIES:  Allergies    No Known Allergies    Intolerances        LABS:                        12.1   5.23  )-----------( 178      ( 04 Oct 2020 06:01 )             38.9     10-04    141  |  102  |  22  ----------------------------<  298<H>  4.7   |  27  |  0.75    Ca    9.8      04 Oct 2020 06:01  Phos  3.7     10-04  Mg     2.0     10-04    TPro  7.4  /  Alb  3.7  /  TBili  0.3  /  DBili  x   /  AST  12  /  ALT  11  /  AlkPhos  76  10-04        CAPILLARY BLOOD GLUCOSE      POCT Blood Glucose.: 276 mg/dL (04 Oct 2020 05:49)      RADIOLOGY & ADDITIONAL TESTS: Reviewed.    ASSESSMENT:    PLAN:

## 2020-10-04 NOTE — PROGRESS NOTE ADULT - PROBLEM SELECTOR PLAN 2
Pt observed to have HR <50 overnight 9/30 and 10/1. Reported chest discomfort earlier on 10/1 but currently denies chest pain or palpitations. Denies prior history of bradycardia. EKG shows sinus bradycardia. Currently HR b/t 30-40s. HR improving since initiating dobutamine gtt (10/2). Arterial line placed 10/2.  - c/w dobutamine 2.5mg gtt, f/u cardiology recs  - if patient become symptomatic and or tachycardic will discontinue dobutamine infusion and alert Cardiology fellow  - will keep Atropine at bedside and Pacer pads on  - f/u TSH  - f/u CXR

## 2020-10-05 LAB
ALBUMIN SERPL ELPH-MCNC: 3.5 G/DL — SIGNIFICANT CHANGE UP (ref 3.3–5)
ALP SERPL-CCNC: 75 U/L — SIGNIFICANT CHANGE UP (ref 40–120)
ALT FLD-CCNC: 11 U/L — SIGNIFICANT CHANGE UP (ref 10–45)
ANION GAP SERPL CALC-SCNC: 13 MMOL/L — SIGNIFICANT CHANGE UP (ref 5–17)
ANISOCYTOSIS BLD QL: SLIGHT — SIGNIFICANT CHANGE UP
AST SERPL-CCNC: 11 U/L — SIGNIFICANT CHANGE UP (ref 10–40)
BASOPHILS # BLD AUTO: 0 K/UL — SIGNIFICANT CHANGE UP (ref 0–0.2)
BASOPHILS NFR BLD AUTO: 0 % — SIGNIFICANT CHANGE UP (ref 0–2)
BILIRUB SERPL-MCNC: 0.4 MG/DL — SIGNIFICANT CHANGE UP (ref 0.2–1.2)
BUN SERPL-MCNC: 26 MG/DL — HIGH (ref 7–23)
CALCIUM SERPL-MCNC: 9.7 MG/DL — SIGNIFICANT CHANGE UP (ref 8.4–10.5)
CHLORIDE SERPL-SCNC: 103 MMOL/L — SIGNIFICANT CHANGE UP (ref 96–108)
CO2 SERPL-SCNC: 25 MMOL/L — SIGNIFICANT CHANGE UP (ref 22–31)
CREAT SERPL-MCNC: 0.84 MG/DL — SIGNIFICANT CHANGE UP (ref 0.5–1.3)
CRP SERPL-MCNC: 1.8 MG/DL — HIGH (ref 0–0.4)
D DIMER BLD IA.RAPID-MCNC: 6086 NG/ML DDU — HIGH
EOSINOPHIL # BLD AUTO: 0 K/UL — SIGNIFICANT CHANGE UP (ref 0–0.5)
EOSINOPHIL NFR BLD AUTO: 0 % — SIGNIFICANT CHANGE UP (ref 0–6)
FERRITIN SERPL-MCNC: 190 NG/ML — HIGH (ref 15–150)
GLUCOSE BLDC GLUCOMTR-MCNC: 107 MG/DL — HIGH (ref 70–99)
GLUCOSE BLDC GLUCOMTR-MCNC: 158 MG/DL — HIGH (ref 70–99)
GLUCOSE BLDC GLUCOMTR-MCNC: 247 MG/DL — HIGH (ref 70–99)
GLUCOSE BLDC GLUCOMTR-MCNC: 349 MG/DL — HIGH (ref 70–99)
GLUCOSE SERPL-MCNC: 279 MG/DL — HIGH (ref 70–99)
HCT VFR BLD CALC: 39.7 % — SIGNIFICANT CHANGE UP (ref 34.5–45)
HGB BLD-MCNC: 12.3 G/DL — SIGNIFICANT CHANGE UP (ref 11.5–15.5)
LYMPHOCYTES # BLD AUTO: 1.4 K/UL — SIGNIFICANT CHANGE UP (ref 1–3.3)
LYMPHOCYTES # BLD AUTO: 20.8 % — SIGNIFICANT CHANGE UP (ref 13–44)
MACROCYTES BLD QL: SLIGHT — SIGNIFICANT CHANGE UP
MAGNESIUM SERPL-MCNC: 2.1 MG/DL — SIGNIFICANT CHANGE UP (ref 1.6–2.6)
MANUAL SMEAR VERIFICATION: SIGNIFICANT CHANGE UP
MCHC RBC-ENTMCNC: 24.6 PG — LOW (ref 27–34)
MCHC RBC-ENTMCNC: 31 GM/DL — LOW (ref 32–36)
MCV RBC AUTO: 79.4 FL — LOW (ref 80–100)
MICROCYTES BLD QL: SLIGHT — SIGNIFICANT CHANGE UP
MONOCYTES # BLD AUTO: 0.06 K/UL — SIGNIFICANT CHANGE UP (ref 0–0.9)
MONOCYTES NFR BLD AUTO: 0.9 % — LOW (ref 2–14)
MYELOCYTES NFR BLD: 0.9 % — HIGH (ref 0–0)
NEUTROPHILS # BLD AUTO: 5.22 K/UL — SIGNIFICANT CHANGE UP (ref 1.8–7.4)
NEUTROPHILS NFR BLD AUTO: 77.4 % — HIGH (ref 43–77)
OVALOCYTES BLD QL SMEAR: SLIGHT — SIGNIFICANT CHANGE UP
PHOSPHATE SERPL-MCNC: 3.8 MG/DL — SIGNIFICANT CHANGE UP (ref 2.5–4.5)
PLAT MORPH BLD: NORMAL — SIGNIFICANT CHANGE UP
PLATELET # BLD AUTO: 226 K/UL — SIGNIFICANT CHANGE UP (ref 150–400)
POLYCHROMASIA BLD QL SMEAR: SLIGHT — SIGNIFICANT CHANGE UP
POTASSIUM SERPL-MCNC: 4.8 MMOL/L — SIGNIFICANT CHANGE UP (ref 3.5–5.3)
POTASSIUM SERPL-SCNC: 4.8 MMOL/L — SIGNIFICANT CHANGE UP (ref 3.5–5.3)
PROT SERPL-MCNC: 7.3 G/DL — SIGNIFICANT CHANGE UP (ref 6–8.3)
RBC # BLD: 5 M/UL — SIGNIFICANT CHANGE UP (ref 3.8–5.2)
RBC # FLD: 15.5 % — HIGH (ref 10.3–14.5)
RBC BLD AUTO: ABNORMAL
SODIUM SERPL-SCNC: 141 MMOL/L — SIGNIFICANT CHANGE UP (ref 135–145)
WBC # BLD: 6.75 K/UL — SIGNIFICANT CHANGE UP (ref 3.8–10.5)
WBC # FLD AUTO: 6.75 K/UL — SIGNIFICANT CHANGE UP (ref 3.8–10.5)

## 2020-10-05 PROCEDURE — 99291 CRITICAL CARE FIRST HOUR: CPT | Mod: CS

## 2020-10-05 PROCEDURE — 71045 X-RAY EXAM CHEST 1 VIEW: CPT | Mod: 26

## 2020-10-05 PROCEDURE — 99233 SBSQ HOSP IP/OBS HIGH 50: CPT | Mod: CS,GC

## 2020-10-05 RX ORDER — INSULIN LISPRO 100/ML
6 VIAL (ML) SUBCUTANEOUS
Refills: 0 | Status: DISCONTINUED | OUTPATIENT
Start: 2020-10-05 | End: 2020-10-07

## 2020-10-05 RX ORDER — INSULIN GLARGINE 100 [IU]/ML
9 INJECTION, SOLUTION SUBCUTANEOUS AT BEDTIME
Refills: 0 | Status: DISCONTINUED | OUTPATIENT
Start: 2020-10-05 | End: 2020-10-05

## 2020-10-05 RX ORDER — INSULIN GLARGINE 100 [IU]/ML
11 INJECTION, SOLUTION SUBCUTANEOUS AT BEDTIME
Refills: 0 | Status: DISCONTINUED | OUTPATIENT
Start: 2020-10-05 | End: 2020-10-07

## 2020-10-05 RX ORDER — DEXAMETHASONE 0.5 MG/5ML
6 ELIXIR ORAL EVERY 24 HOURS
Refills: 0 | Status: COMPLETED | OUTPATIENT
Start: 2020-10-05 | End: 2020-10-08

## 2020-10-05 RX ORDER — INSULIN LISPRO 100/ML
3 VIAL (ML) SUBCUTANEOUS
Refills: 0 | Status: DISCONTINUED | OUTPATIENT
Start: 2020-10-05 | End: 2020-10-05

## 2020-10-05 RX ADMIN — Medication 2: at 18:01

## 2020-10-05 RX ADMIN — ENOXAPARIN SODIUM 40 MILLIGRAM(S): 100 INJECTION SUBCUTANEOUS at 18:04

## 2020-10-05 RX ADMIN — LIOTHYRONINE SODIUM 25 MICROGRAM(S): 25 TABLET ORAL at 05:51

## 2020-10-05 RX ADMIN — PANTOPRAZOLE SODIUM 40 MILLIGRAM(S): 20 TABLET, DELAYED RELEASE ORAL at 05:52

## 2020-10-05 RX ADMIN — Medication 6 UNIT(S): at 18:02

## 2020-10-05 RX ADMIN — INSULIN GLARGINE 11 UNIT(S): 100 INJECTION, SOLUTION SUBCUTANEOUS at 21:43

## 2020-10-05 RX ADMIN — Medication 1000 UNIT(S): at 12:30

## 2020-10-05 RX ADMIN — Medication 4: at 06:15

## 2020-10-05 RX ADMIN — POLYETHYLENE GLYCOL 3350 17 GRAM(S): 17 POWDER, FOR SOLUTION ORAL at 12:31

## 2020-10-05 RX ADMIN — Medication 325 MILLIGRAM(S): at 12:30

## 2020-10-05 RX ADMIN — ARIPIPRAZOLE 10 MILLIGRAM(S): 15 TABLET ORAL at 12:31

## 2020-10-05 RX ADMIN — ATORVASTATIN CALCIUM 20 MILLIGRAM(S): 80 TABLET, FILM COATED ORAL at 21:43

## 2020-10-05 RX ADMIN — QUETIAPINE FUMARATE 400 MILLIGRAM(S): 200 TABLET, FILM COATED ORAL at 21:43

## 2020-10-05 RX ADMIN — ENOXAPARIN SODIUM 40 MILLIGRAM(S): 100 INJECTION SUBCUTANEOUS at 05:51

## 2020-10-05 RX ADMIN — Medication 8: at 12:28

## 2020-10-05 RX ADMIN — Medication 6 MILLIGRAM(S): at 21:42

## 2020-10-05 RX ADMIN — DIVALPROEX SODIUM 1000 MILLIGRAM(S): 500 TABLET, DELAYED RELEASE ORAL at 21:43

## 2020-10-05 RX ADMIN — REMDESIVIR 500 MILLIGRAM(S): 5 INJECTION INTRAVENOUS at 21:55

## 2020-10-05 RX ADMIN — Medication 30 MILLIGRAM(S): at 12:30

## 2020-10-05 NOTE — PROGRESS NOTE ADULT - SUBJECTIVE AND OBJECTIVE BOX
INTERVAL EVENTS:  - Started on  10/2 evening, weaned off this AM.  - SOB stable. Denies lightheadedness, CP.    MEDICATIONS  (STANDING):  ARIPiprazole 10 milliGRAM(s) Oral daily  atorvastatin 20 milliGRAM(s) Oral at bedtime  cholecalciferol 1000 Unit(s) Oral daily  dexAMETHasone  Injectable 6 milliGRAM(s) IV Push every 24 hours  dextrose 5%. 1000 milliLiter(s) (50 mL/Hr) IV Continuous <Continuous>  dextrose 50% Injectable 12.5 Gram(s) IV Push once  dextrose 50% Injectable 25 Gram(s) IV Push once  dextrose 50% Injectable 25 Gram(s) IV Push once  diVALproex ER 1000 milliGRAM(s) Oral at bedtime  enoxaparin Injectable 40 milliGRAM(s) SubCutaneous every 12 hours  ferrous    sulfate 325 milliGRAM(s) Oral daily  influenza  Vaccine (HIGH DOSE) 0.7 milliLiter(s) IntraMuscular once  insulin glargine Injectable (LANTUS) 11 Unit(s) SubCutaneous at bedtime  insulin lispro (HumaLOG) corrective regimen sliding scale   SubCutaneous Before meals and at bedtime  insulin lispro Injectable (HumaLOG) 6 Unit(s) SubCutaneous three times a day before meals  liothyronine 25 MICROGram(s) Oral daily  pantoprazole    Tablet 40 milliGRAM(s) Oral before breakfast  PARoxetine 30 milliGRAM(s) Oral daily  polyethylene glycol 3350 17 Gram(s) Oral every 24 hours  QUEtiapine 400 milliGRAM(s) Oral at bedtime  remdesivir  IVPB 100 milliGRAM(s) IV Intermittent every 24 hours    MEDICATIONS  (PRN):  benzocaine 15 mG/menthol 3.6 mG (Sugar-Free) Lozenge 1 Lozenge Oral two times a day PRN Sore Throat  dextrose 40% Gel 15 Gram(s) Oral once PRN Blood Glucose LESS THAN 70 milliGRAM(s)/deciliter  glucagon  Injectable 1 milliGRAM(s) IntraMuscular once PRN Glucose LESS THAN 70 milligrams/deciliter      Home Medications:  ARIPiprazole 10 mg oral tablet: 1 tab(s) orally once a day (29 Sep 2020 22:33)  atorvastatin 20 mg oral tablet: 1 tab(s) orally once a day (29 Sep 2020 22:33)  cholecalciferol 1000 intl units (25 mcg) oral capsule: 1 cap(s) orally once a day (29 Sep 2020 22:33)  divalproex sodium 500 mg oral delayed release tablet: 2 tab(s) orally once a day (at bedtime) (29 Sep 2020 22:33)  ferrous sulfate 325 mg (65 mg elemental iron) oral tablet: 1 tab(s) orally once a day (29 Sep 2020 22:33)  Januvia 100 mg oral tablet: 1 tab(s) orally once a day (29 Sep 2020 22:33)  liothyronine 25 mcg oral tablet: 1 tab(s) orally once a day (29 Sep 2020 22:33)  metFORMIN 1000 mg oral tablet: 1 tab(s) orally 2 times a day (29 Sep 2020 22:33)  nitrofurantoin macrocrystals 50 mg oral capsule: 1 cap(s) orally once a day (29 Sep 2020 22:33)  oxybutynin 10 mg/24 hr oral tablet, extended release: 1 tab(s) orally once a day (29 Sep 2020 22:33)  PARoxetine 30 mg oral tablet: 1 tab(s) orally once a day (29 Sep 2020 22:33)  polyethylene glycol 3350 oral powder for reconstitution: 17 gram(s) orally every 24 hours (29 Sep 2020 22:33)  QUEtiapine 200 mg oral tablet: 2 tab(s) orally once a day (at bedtime) (29 Sep 2020 22:33)  Vascepa 1 g oral capsule: 2 cap(s) orally 2 times a day (29 Sep 2020 22:33)      Vital Signs Last 24 Hrs  T(C): 36.4 (05 Oct 2020 13:50), Max: 36.4 (05 Oct 2020 06:07)  T(F): 97.6 (05 Oct 2020 13:50), Max: 97.6 (05 Oct 2020 13:50)  HR: 43 (05 Oct 2020 13:00) (43 - 78)  BP: --  BP(mean): --  RR: 28 (05 Oct 2020 13:00) (24 - 31)  SpO2: 94% (05 Oct 2020 13:00) (88% - 94%)     PHYSICAL EXAM:  GEN: Awake, alert. NAD.   HEENT: NCAT, PERRL, EOMI. Mucosa moist. No JVD.  RESP: CTA b/l  CV: RRR. Normal S1/S2. No m/r/g.  ABD: Soft. NT/ND. BS+  EXT: Warm. No edema, clubbing, or cyanosis.   NEURO: AAOx3. No focal deficits.     LABS:                        12.3   6.75  )-----------( 226      ( 05 Oct 2020 06:27 )             39.7     10-05    141  |  103  |  26<H>  ----------------------------<  279<H>  4.8   |  25  |  0.84    Ca    9.7      05 Oct 2020 06:27  Phos  3.8     10-05  Mg     2.1     10-05    TPro  7.3  /  Alb  3.5  /  TBili  0.4  /  DBili  x   /  AST  11  /  ALT  11  /  AlkPhos  75  10-05        I&O's Summary    04 Oct 2020 07:01  -  05 Oct 2020 07:00  --------------------------------------------------------  IN: 286.4 mL / OUT: 500 mL / NET: -213.6 mL    05 Oct 2020 07:01  -  05 Oct 2020 18:15  --------------------------------------------------------  IN: 3 mL / OUT: 1200 mL / NET: -1197 mL        65F PMH BPD, DM, recurrent UTIs on nitrofurantoin, hypothyroidism, p/w cough/fever in setting of COVID. Cardiology consulted for bradycardia.    #Bradycardia: Asymptomatic. Weaned off  gtt.  - No acute interventions needed  - Atropine at bedside for symptomatic worsening bradycardia. If unresponsive, could start  gtt although no need at this point. Pacing for unstable bradycardia.  - Monitor QTc. Can check with monitor to minimize exposure.  - Recheck TFTs.     D/w Dr. Octaviano Cedeño MD PGY4

## 2020-10-05 NOTE — PROGRESS NOTE ADULT - PROBLEM SELECTOR PLAN 1
Pt presented with dyspnea and was found to be hypoxemic requiring NRB supplemental oxygen. Since 10/1, she has been on NRB 10L with SpO2 92-94% and denies any worsening of her dyspnea/cough today 10/2. SpO2 noted to decrease to 85% on 10L NRB. Patient currently on HFNC 50/50  - f/u ABG   - continue to monitor respiratory status Pt presented with dyspnea and was found to be hypoxemic requiring NRB supplemental oxygen. Since 10/1, she has been on NRB 10L with SpO2 92-94% and denies any worsening of her dyspnea/cough today 10/2. SpO2 noted to decrease to 85% on 10L NRB. Patient currently on HFNC 40/40  - f/u ABG   - continue to monitor respiratory status

## 2020-10-05 NOTE — PROGRESS NOTE ADULT - PROBLEM SELECTOR PLAN 3
Pt COVID postive x2,  presenting with fever of 101.4F in ED on 9/29  -contact/droplet isolation precautions  -c/w HF as tolerated   -c/w Remdesivir 200 mg x1, then 100 mg x 4 days (last day 10/8)  -c/w Decadron IV 6mg daily (first dose 9/30 12 AM)  -f/u daily COVID labs- CBC w/ diff, CMP, ferritin, CRP, D-dimer, Mg, Phos Pt COVID postive x2,  presenting with fever of 101.4F in ED on 9/29  -contact/droplet isolation precautions  -c/w HF as tolerated   -c/w Remdesivir 200 mg x1, then 100 mg x 4 days (last day 10/8)  -c/w Decadron IV 6mg daily x 10days (first dose 9/30 12 AM)  -f/u daily COVID labs- CBC w/ diff, CMP, ferritin, CRP, D-dimer, Mg, Phos

## 2020-10-05 NOTE — PROGRESS NOTE ADULT - SUBJECTIVE AND OBJECTIVE BOX
SUBJECTIVE/OVERNIGHT EVENTS: No acute overnight events. Pt seen in AM at bedside, resting comfortably in bed, and does not appear to be in any acute distress. When asked, pt denies any recent or active fever, chills, nausea, vomiting, headache, acute sob, chest pain, abdominal pain, genitourinary sx, extremity pain or swelling.    VITAL SIGNS:  Vital Signs Last 24 Hrs  T(C): 36.4 (05 Oct 2020 08:32), Max: 36.4 (04 Oct 2020 17:20)  T(F): 97.5 (05 Oct 2020 08:32), Max: 97.6 (04 Oct 2020 17:20)  HR: 55 (05 Oct 2020 08:00) (49 - 78)  BP: 125/61 (04 Oct 2020 15:00) (125/61 - 125/61)  BP(mean): --  RR: 29 (05 Oct 2020 08:00) (16 - 31)  SpO2: 88% (05 Oct 2020 08:00) (88% - 95%)    PHYSICAL EXAM:  General: NAD; speaking in full sentences  HEENT: NC/AT; PERRL; EOMI; MMM  Neck: supple; no JVD  Cardiac: RRR; +S1/S2  Pulm: CTA B/L; no W/R/R  GI: soft, NT/ND, +BS  Extremities: WWP; no edema, clubbing or cyanosis  Vasc: 2+ radial, DP pulses B/L  Neuro: AAOx3; no focal deficits    MEDICATIONS:  MEDICATIONS  (STANDING):  ARIPiprazole 10 milliGRAM(s) Oral daily  atorvastatin 20 milliGRAM(s) Oral at bedtime  cholecalciferol 1000 Unit(s) Oral daily  dexAMETHasone  Injectable 6 milliGRAM(s) IV Push every 24 hours  dextrose 5%. 1000 milliLiter(s) (50 mL/Hr) IV Continuous <Continuous>  dextrose 50% Injectable 12.5 Gram(s) IV Push once  dextrose 50% Injectable 25 Gram(s) IV Push once  dextrose 50% Injectable 25 Gram(s) IV Push once  diVALproex ER 1000 milliGRAM(s) Oral at bedtime  DOBUTamine Infusion 0.885 MICROgram(s)/kG/Min (1.5 mL/Hr) IV Continuous <Continuous>  enoxaparin Injectable 40 milliGRAM(s) SubCutaneous every 12 hours  ferrous    sulfate 325 milliGRAM(s) Oral daily  influenza  Vaccine (HIGH DOSE) 0.7 milliLiter(s) IntraMuscular once  insulin glargine Injectable (LANTUS) 6 Unit(s) SubCutaneous at bedtime  insulin lispro (HumaLOG) corrective regimen sliding scale   SubCutaneous Before meals and at bedtime  liothyronine 25 MICROGram(s) Oral daily  pantoprazole    Tablet 40 milliGRAM(s) Oral before breakfast  PARoxetine 30 milliGRAM(s) Oral daily  polyethylene glycol 3350 17 Gram(s) Oral every 24 hours  QUEtiapine 400 milliGRAM(s) Oral at bedtime  remdesivir  IVPB 100 milliGRAM(s) IV Intermittent every 24 hours    MEDICATIONS  (PRN):  benzocaine 15 mG/menthol 3.6 mG (Sugar-Free) Lozenge 1 Lozenge Oral two times a day PRN Sore Throat  dextrose 40% Gel 15 Gram(s) Oral once PRN Blood Glucose LESS THAN 70 milliGRAM(s)/deciliter  glucagon  Injectable 1 milliGRAM(s) IntraMuscular once PRN Glucose LESS THAN 70 milligrams/deciliter      ALLERGIES:  Allergies    No Known Allergies    Intolerances        LABS:                        12.3   6.75  )-----------( 226      ( 05 Oct 2020 06:27 )             39.7     10-05    141  |  103  |  26<H>  ----------------------------<  279<H>  4.8   |  25  |  0.84    Ca    9.7      05 Oct 2020 06:27  Phos  3.8     10-05  Mg     2.1     10-05    TPro  7.3  /  Alb  3.5  /  TBili  0.4  /  DBili  x   /  AST  11  /  ALT  11  /  AlkPhos  75  10-05        RADIOLOGY & ADDITIONAL TESTS: Reviewed. SUBJECTIVE/OVERNIGHT EVENTS:     VITAL SIGNS:  Vital Signs Last 24 Hrs  T(C): 36.4 (05 Oct 2020 08:32), Max: 36.4 (04 Oct 2020 17:20)  T(F): 97.5 (05 Oct 2020 08:32), Max: 97.6 (04 Oct 2020 17:20)  HR: 55 (05 Oct 2020 08:00) (49 - 78)  BP: 125/61 (04 Oct 2020 15:00) (125/61 - 125/61)  BP(mean): --  RR: 29 (05 Oct 2020 08:00) (16 - 31)  SpO2: 88% (05 Oct 2020 08:00) (88% - 95%)    PHYSICAL EXAM:  General: NAD; speaking in full sentences  HEENT: NC/AT; PERRL; EOMI; MMM  Neck: supple; no JVD  Cardiac: +bradycardia; +S1/S2  Pulm: +breathing on high flow NC; otherwise CTA B/L; no W/R/R  GI: soft, NT/ND, +BS  Extremities: WWP; no edema, clubbing or cyanosis  Vasc: 2+ radial, DP pulses B/L  Neuro: AAOx3; no focal deficits    MEDICATIONS:  MEDICATIONS  (STANDING):  ARIPiprazole 10 milliGRAM(s) Oral daily  atorvastatin 20 milliGRAM(s) Oral at bedtime  cholecalciferol 1000 Unit(s) Oral daily  dexAMETHasone  Injectable 6 milliGRAM(s) IV Push every 24 hours  dextrose 5%. 1000 milliLiter(s) (50 mL/Hr) IV Continuous <Continuous>  dextrose 50% Injectable 12.5 Gram(s) IV Push once  dextrose 50% Injectable 25 Gram(s) IV Push once  dextrose 50% Injectable 25 Gram(s) IV Push once  diVALproex ER 1000 milliGRAM(s) Oral at bedtime  DOBUTamine Infusion 0.885 MICROgram(s)/kG/Min (1.5 mL/Hr) IV Continuous <Continuous>  enoxaparin Injectable 40 milliGRAM(s) SubCutaneous every 12 hours  ferrous    sulfate 325 milliGRAM(s) Oral daily  influenza  Vaccine (HIGH DOSE) 0.7 milliLiter(s) IntraMuscular once  insulin glargine Injectable (LANTUS) 6 Unit(s) SubCutaneous at bedtime  insulin lispro (HumaLOG) corrective regimen sliding scale   SubCutaneous Before meals and at bedtime  liothyronine 25 MICROGram(s) Oral daily  pantoprazole    Tablet 40 milliGRAM(s) Oral before breakfast  PARoxetine 30 milliGRAM(s) Oral daily  polyethylene glycol 3350 17 Gram(s) Oral every 24 hours  QUEtiapine 400 milliGRAM(s) Oral at bedtime  remdesivir  IVPB 100 milliGRAM(s) IV Intermittent every 24 hours    MEDICATIONS  (PRN):  benzocaine 15 mG/menthol 3.6 mG (Sugar-Free) Lozenge 1 Lozenge Oral two times a day PRN Sore Throat  dextrose 40% Gel 15 Gram(s) Oral once PRN Blood Glucose LESS THAN 70 milliGRAM(s)/deciliter  glucagon  Injectable 1 milliGRAM(s) IntraMuscular once PRN Glucose LESS THAN 70 milligrams/deciliter      ALLERGIES:  Allergies    No Known Allergies    Intolerances        LABS:                        12.3   6.75  )-----------( 226      ( 05 Oct 2020 06:27 )             39.7     10-05    141  |  103  |  26<H>  ----------------------------<  279<H>  4.8   |  25  |  0.84    Ca    9.7      05 Oct 2020 06:27  Phos  3.8     10-05  Mg     2.1     10-05    TPro  7.3  /  Alb  3.5  /  TBili  0.4  /  DBili  x   /  AST  11  /  ALT  11  /  AlkPhos  75  10-05        RADIOLOGY & ADDITIONAL TESTS: Reviewed. SUBJECTIVE/OVERNIGHT EVENTS: No acute overnight events. Pt seen at bedside in AM, resting upright in bed, breathing comfortably on HFNC 40%/50L, and does not appear to be in acute respiratory distress.    VITAL SIGNS:  Vital Signs Last 24 Hrs  T(C): 36.4 (05 Oct 2020 08:32), Max: 36.4 (04 Oct 2020 17:20)  T(F): 97.5 (05 Oct 2020 08:32), Max: 97.6 (04 Oct 2020 17:20)  HR: 55 (05 Oct 2020 08:00) (49 - 78)  BP: 125/61 (04 Oct 2020 15:00) (125/61 - 125/61)  BP(mean): --  RR: 29 (05 Oct 2020 08:00) (16 - 31)  SpO2: 88% (05 Oct 2020 08:00) (88% - 95%)    PHYSICAL EXAM:  General: NAD; speaking in full sentences  HEENT: NC/AT; PERRL; EOMI; MMM  Neck: supple; no JVD  Cardiac: +bradycardia; +S1/S2  Pulm: +breathing on high flow NC; otherwise CTA B/L; no W/R/R  GI: soft, NT/ND, +BS  Extremities: WWP; no edema, clubbing or cyanosis  Vasc: 2+ radial, DP pulses B/L  Neuro: AAOx3; no focal deficits    MEDICATIONS:  MEDICATIONS  (STANDING):  ARIPiprazole 10 milliGRAM(s) Oral daily  atorvastatin 20 milliGRAM(s) Oral at bedtime  cholecalciferol 1000 Unit(s) Oral daily  dexAMETHasone  Injectable 6 milliGRAM(s) IV Push every 24 hours  dextrose 5%. 1000 milliLiter(s) (50 mL/Hr) IV Continuous <Continuous>  dextrose 50% Injectable 12.5 Gram(s) IV Push once  dextrose 50% Injectable 25 Gram(s) IV Push once  dextrose 50% Injectable 25 Gram(s) IV Push once  diVALproex ER 1000 milliGRAM(s) Oral at bedtime  DOBUTamine Infusion 0.885 MICROgram(s)/kG/Min (1.5 mL/Hr) IV Continuous <Continuous>  enoxaparin Injectable 40 milliGRAM(s) SubCutaneous every 12 hours  ferrous    sulfate 325 milliGRAM(s) Oral daily  influenza  Vaccine (HIGH DOSE) 0.7 milliLiter(s) IntraMuscular once  insulin glargine Injectable (LANTUS) 6 Unit(s) SubCutaneous at bedtime  insulin lispro (HumaLOG) corrective regimen sliding scale   SubCutaneous Before meals and at bedtime  liothyronine 25 MICROGram(s) Oral daily  pantoprazole    Tablet 40 milliGRAM(s) Oral before breakfast  PARoxetine 30 milliGRAM(s) Oral daily  polyethylene glycol 3350 17 Gram(s) Oral every 24 hours  QUEtiapine 400 milliGRAM(s) Oral at bedtime  remdesivir  IVPB 100 milliGRAM(s) IV Intermittent every 24 hours    MEDICATIONS  (PRN):  benzocaine 15 mG/menthol 3.6 mG (Sugar-Free) Lozenge 1 Lozenge Oral two times a day PRN Sore Throat  dextrose 40% Gel 15 Gram(s) Oral once PRN Blood Glucose LESS THAN 70 milliGRAM(s)/deciliter  glucagon  Injectable 1 milliGRAM(s) IntraMuscular once PRN Glucose LESS THAN 70 milligrams/deciliter      ALLERGIES:  Allergies    No Known Allergies    Intolerances        LABS:                        12.3   6.75  )-----------( 226      ( 05 Oct 2020 06:27 )             39.7     10-05    141  |  103  |  26<H>  ----------------------------<  279<H>  4.8   |  25  |  0.84    Ca    9.7      05 Oct 2020 06:27  Phos  3.8     10-05  Mg     2.1     10-05    TPro  7.3  /  Alb  3.5  /  TBili  0.4  /  DBili  x   /  AST  11  /  ALT  11  /  AlkPhos  75  10-05        RADIOLOGY & ADDITIONAL TESTS: Reviewed.

## 2020-10-06 LAB
ALBUMIN SERPL ELPH-MCNC: 3.3 G/DL — SIGNIFICANT CHANGE UP (ref 3.3–5)
ALP SERPL-CCNC: 73 U/L — SIGNIFICANT CHANGE UP (ref 40–120)
ALT FLD-CCNC: 9 U/L — LOW (ref 10–45)
ANION GAP SERPL CALC-SCNC: 12 MMOL/L — SIGNIFICANT CHANGE UP (ref 5–17)
AST SERPL-CCNC: 10 U/L — SIGNIFICANT CHANGE UP (ref 10–40)
BASOPHILS # BLD AUTO: 0.01 K/UL — SIGNIFICANT CHANGE UP (ref 0–0.2)
BASOPHILS NFR BLD AUTO: 0.1 % — SIGNIFICANT CHANGE UP (ref 0–2)
BILIRUB SERPL-MCNC: 0.4 MG/DL — SIGNIFICANT CHANGE UP (ref 0.2–1.2)
BUN SERPL-MCNC: 31 MG/DL — HIGH (ref 7–23)
CALCIUM SERPL-MCNC: 9.4 MG/DL — SIGNIFICANT CHANGE UP (ref 8.4–10.5)
CHLORIDE SERPL-SCNC: 101 MMOL/L — SIGNIFICANT CHANGE UP (ref 96–108)
CO2 SERPL-SCNC: 25 MMOL/L — SIGNIFICANT CHANGE UP (ref 22–31)
CREAT SERPL-MCNC: 0.79 MG/DL — SIGNIFICANT CHANGE UP (ref 0.5–1.3)
CRP SERPL-MCNC: 1.48 MG/DL — HIGH (ref 0–0.4)
D DIMER BLD IA.RAPID-MCNC: 6208 NG/ML DDU — HIGH
EOSINOPHIL # BLD AUTO: 0 K/UL — SIGNIFICANT CHANGE UP (ref 0–0.5)
EOSINOPHIL NFR BLD AUTO: 0 % — SIGNIFICANT CHANGE UP (ref 0–6)
FERRITIN SERPL-MCNC: 161 NG/ML — HIGH (ref 15–150)
GLUCOSE BLDC GLUCOMTR-MCNC: 146 MG/DL — HIGH (ref 70–99)
GLUCOSE BLDC GLUCOMTR-MCNC: 198 MG/DL — HIGH (ref 70–99)
GLUCOSE BLDC GLUCOMTR-MCNC: 309 MG/DL — HIGH (ref 70–99)
GLUCOSE BLDC GLUCOMTR-MCNC: 70 MG/DL — SIGNIFICANT CHANGE UP (ref 70–99)
GLUCOSE SERPL-MCNC: 222 MG/DL — HIGH (ref 70–99)
HCT VFR BLD CALC: 38.4 % — SIGNIFICANT CHANGE UP (ref 34.5–45)
HGB BLD-MCNC: 11.8 G/DL — SIGNIFICANT CHANGE UP (ref 11.5–15.5)
IMM GRANULOCYTES NFR BLD AUTO: 4 % — HIGH (ref 0–1.5)
LYMPHOCYTES # BLD AUTO: 1.14 K/UL — SIGNIFICANT CHANGE UP (ref 1–3.3)
LYMPHOCYTES # BLD AUTO: 15.3 % — SIGNIFICANT CHANGE UP (ref 13–44)
MAGNESIUM SERPL-MCNC: 2.2 MG/DL — SIGNIFICANT CHANGE UP (ref 1.6–2.6)
MCHC RBC-ENTMCNC: 24.6 PG — LOW (ref 27–34)
MCHC RBC-ENTMCNC: 30.7 GM/DL — LOW (ref 32–36)
MCV RBC AUTO: 80 FL — SIGNIFICANT CHANGE UP (ref 80–100)
MONOCYTES # BLD AUTO: 0.18 K/UL — SIGNIFICANT CHANGE UP (ref 0–0.9)
MONOCYTES NFR BLD AUTO: 2.4 % — SIGNIFICANT CHANGE UP (ref 2–14)
NEUTROPHILS # BLD AUTO: 5.84 K/UL — SIGNIFICANT CHANGE UP (ref 1.8–7.4)
NEUTROPHILS NFR BLD AUTO: 78.2 % — HIGH (ref 43–77)
NRBC # BLD: 0 /100 WBCS — SIGNIFICANT CHANGE UP (ref 0–0)
PHOSPHATE SERPL-MCNC: 4.5 MG/DL — SIGNIFICANT CHANGE UP (ref 2.5–4.5)
PLATELET # BLD AUTO: 229 K/UL — SIGNIFICANT CHANGE UP (ref 150–400)
POTASSIUM SERPL-MCNC: 4.6 MMOL/L — SIGNIFICANT CHANGE UP (ref 3.5–5.3)
POTASSIUM SERPL-SCNC: 4.6 MMOL/L — SIGNIFICANT CHANGE UP (ref 3.5–5.3)
PROT SERPL-MCNC: 6.8 G/DL — SIGNIFICANT CHANGE UP (ref 6–8.3)
RBC # BLD: 4.8 M/UL — SIGNIFICANT CHANGE UP (ref 3.8–5.2)
RBC # FLD: 15.8 % — HIGH (ref 10.3–14.5)
SODIUM SERPL-SCNC: 138 MMOL/L — SIGNIFICANT CHANGE UP (ref 135–145)
TSH SERPL-MCNC: 0.14 UIU/ML — LOW (ref 0.35–4.94)
WBC # BLD: 7.47 K/UL — SIGNIFICANT CHANGE UP (ref 3.8–10.5)
WBC # FLD AUTO: 7.47 K/UL — SIGNIFICANT CHANGE UP (ref 3.8–10.5)

## 2020-10-06 PROCEDURE — 71045 X-RAY EXAM CHEST 1 VIEW: CPT | Mod: 26

## 2020-10-06 PROCEDURE — 99233 SBSQ HOSP IP/OBS HIGH 50: CPT | Mod: CS,GC

## 2020-10-06 PROCEDURE — 99291 CRITICAL CARE FIRST HOUR: CPT | Mod: CS

## 2020-10-06 RX ADMIN — ATORVASTATIN CALCIUM 20 MILLIGRAM(S): 80 TABLET, FILM COATED ORAL at 21:21

## 2020-10-06 RX ADMIN — Medication 325 MILLIGRAM(S): at 13:18

## 2020-10-06 RX ADMIN — REMDESIVIR 500 MILLIGRAM(S): 5 INJECTION INTRAVENOUS at 21:56

## 2020-10-06 RX ADMIN — Medication 6 UNIT(S): at 13:19

## 2020-10-06 RX ADMIN — Medication 6 MILLIGRAM(S): at 21:21

## 2020-10-06 RX ADMIN — Medication 6 UNIT(S): at 06:34

## 2020-10-06 RX ADMIN — ARIPIPRAZOLE 10 MILLIGRAM(S): 15 TABLET ORAL at 13:18

## 2020-10-06 RX ADMIN — Medication 6 UNIT(S): at 17:57

## 2020-10-06 RX ADMIN — ENOXAPARIN SODIUM 40 MILLIGRAM(S): 100 INJECTION SUBCUTANEOUS at 06:05

## 2020-10-06 RX ADMIN — ENOXAPARIN SODIUM 40 MILLIGRAM(S): 100 INJECTION SUBCUTANEOUS at 17:57

## 2020-10-06 RX ADMIN — POLYETHYLENE GLYCOL 3350 17 GRAM(S): 17 POWDER, FOR SOLUTION ORAL at 06:06

## 2020-10-06 RX ADMIN — DIVALPROEX SODIUM 1000 MILLIGRAM(S): 500 TABLET, DELAYED RELEASE ORAL at 21:21

## 2020-10-06 RX ADMIN — QUETIAPINE FUMARATE 400 MILLIGRAM(S): 200 TABLET, FILM COATED ORAL at 21:21

## 2020-10-06 RX ADMIN — Medication 1000 UNIT(S): at 13:18

## 2020-10-06 RX ADMIN — Medication 30 MILLIGRAM(S): at 13:18

## 2020-10-06 RX ADMIN — LIOTHYRONINE SODIUM 25 MICROGRAM(S): 25 TABLET ORAL at 06:05

## 2020-10-06 RX ADMIN — PANTOPRAZOLE SODIUM 40 MILLIGRAM(S): 20 TABLET, DELAYED RELEASE ORAL at 06:05

## 2020-10-06 RX ADMIN — Medication 8: at 13:18

## 2020-10-06 RX ADMIN — Medication 2: at 06:34

## 2020-10-06 NOTE — PROGRESS NOTE ADULT - SUBJECTIVE AND OBJECTIVE BOX
INTERVAL EVENTS:  - Off  since yesterday noon      MEDICATIONS  (STANDING):  ARIPiprazole 10 milliGRAM(s) Oral daily  atorvastatin 20 milliGRAM(s) Oral at bedtime  cholecalciferol 1000 Unit(s) Oral daily  dexAMETHasone  Injectable 6 milliGRAM(s) IV Push every 24 hours  dextrose 5%. 1000 milliLiter(s) (50 mL/Hr) IV Continuous <Continuous>  dextrose 50% Injectable 12.5 Gram(s) IV Push once  dextrose 50% Injectable 25 Gram(s) IV Push once  dextrose 50% Injectable 25 Gram(s) IV Push once  diVALproex ER 1000 milliGRAM(s) Oral at bedtime  enoxaparin Injectable 40 milliGRAM(s) SubCutaneous every 12 hours  ferrous    sulfate 325 milliGRAM(s) Oral daily  influenza  Vaccine (HIGH DOSE) 0.7 milliLiter(s) IntraMuscular once  insulin glargine Injectable (LANTUS) 11 Unit(s) SubCutaneous at bedtime  insulin lispro (HumaLOG) corrective regimen sliding scale   SubCutaneous Before meals and at bedtime  insulin lispro Injectable (HumaLOG) 6 Unit(s) SubCutaneous three times a day before meals  liothyronine 25 MICROGram(s) Oral daily  pantoprazole    Tablet 40 milliGRAM(s) Oral before breakfast  PARoxetine 30 milliGRAM(s) Oral daily  polyethylene glycol 3350 17 Gram(s) Oral every 24 hours  QUEtiapine 400 milliGRAM(s) Oral at bedtime  remdesivir  IVPB 100 milliGRAM(s) IV Intermittent every 24 hours    MEDICATIONS  (PRN):  benzocaine 15 mG/menthol 3.6 mG (Sugar-Free) Lozenge 1 Lozenge Oral two times a day PRN Sore Throat  dextrose 40% Gel 15 Gram(s) Oral once PRN Blood Glucose LESS THAN 70 milliGRAM(s)/deciliter  glucagon  Injectable 1 milliGRAM(s) IntraMuscular once PRN Glucose LESS THAN 70 milligrams/deciliter      Home Medications:  ARIPiprazole 10 mg oral tablet: 1 tab(s) orally once a day (29 Sep 2020 22:33)  atorvastatin 20 mg oral tablet: 1 tab(s) orally once a day (29 Sep 2020 22:33)  cholecalciferol 1000 intl units (25 mcg) oral capsule: 1 cap(s) orally once a day (29 Sep 2020 22:33)  divalproex sodium 500 mg oral delayed release tablet: 2 tab(s) orally once a day (at bedtime) (29 Sep 2020 22:33)  ferrous sulfate 325 mg (65 mg elemental iron) oral tablet: 1 tab(s) orally once a day (29 Sep 2020 22:33)  Januvia 100 mg oral tablet: 1 tab(s) orally once a day (29 Sep 2020 22:33)  liothyronine 25 mcg oral tablet: 1 tab(s) orally once a day (29 Sep 2020 22:33)  metFORMIN 1000 mg oral tablet: 1 tab(s) orally 2 times a day (29 Sep 2020 22:33)  nitrofurantoin macrocrystals 50 mg oral capsule: 1 cap(s) orally once a day (29 Sep 2020 22:33)  oxybutynin 10 mg/24 hr oral tablet, extended release: 1 tab(s) orally once a day (29 Sep 2020 22:33)  PARoxetine 30 mg oral tablet: 1 tab(s) orally once a day (29 Sep 2020 22:33)  polyethylene glycol 3350 oral powder for reconstitution: 17 gram(s) orally every 24 hours (29 Sep 2020 22:33)  QUEtiapine 200 mg oral tablet: 2 tab(s) orally once a day (at bedtime) (29 Sep 2020 22:33)  Vascepa 1 g oral capsule: 2 cap(s) orally 2 times a day (29 Sep 2020 22:33)      Vital Signs Last 24 Hrs  T(C): 36.1 (06 Oct 2020 13:00), Max: 37.2 (05 Oct 2020 18:23)  T(F): 96.9 (06 Oct 2020 13:00), Max: 99 (05 Oct 2020 18:23)  HR: 50 (06 Oct 2020 15:00) (43 - 76)  BP: 157/73 (06 Oct 2020 15:00) (100/62 - 157/73)  BP(mean): 97 (06 Oct 2020 11:00) (77 - 97)  RR: 23 (06 Oct 2020 15:00) (20 - 42)  SpO2: 93% (06 Oct 2020 15:00) (85% - 96%)     PHYSICAL EXAM:  NAD on 5L NC  Regular, bradycardic  No edema    LABS:                        11.8   7.47  )-----------( 229      ( 06 Oct 2020 06:38 )             38.4     10-06    138  |  101  |  31<H>  ----------------------------<  222<H>  4.6   |  25  |  0.79    Ca    9.4      06 Oct 2020 06:38  Phos  4.5     10-06  Mg     2.2     10-06    TPro  6.8  /  Alb  3.3  /  TBili  0.4  /  DBili  x   /  AST  10  /  ALT  9<L>  /  AlkPhos  73  10-06      65F PMH BPD, DM, recurrent UTIs on nitrofurantoin, hypothyroidism, p/w cough/fever in setting of COVID. Cardiology consulted for bradycardia.    #Bradycardia: Asymptomatic. Weaned off  gtt.  - Atropine at bedside for symptomatic worsening bradycardia. If unresponsive, could start  gtt although no need at this point. Pacing for unstable bradycardia.    D/w Dr. Octaviano Cedeño MD PGY4

## 2020-10-06 NOTE — PROGRESS NOTE ADULT - PROBLEM SELECTOR PLAN 1
Pt presented with dyspnea and was found to be hypoxemic requiring NRB supplemental oxygen. Since 10/1, she has been on NRB 10L with SpO2 92-94% and denies any worsening of her dyspnea/cough today 10/2. SpO2 noted to decrease to 85% on 10L NRB. Patient weaned off of HFNC to NC 6L today 10/6.  - c/w NC 6L and wean down O2 as tolerated  - continue to monitor respiratory status

## 2020-10-06 NOTE — PROGRESS NOTE ADULT - ASSESSMENT
66yo F with PMH BPD, DM, recurrent UTIs on nitrofurantoin, hypothyroidism, who presented from NH with reported Fever to 104, cough and chills, found to have Hypoxic Respiratory Failure 2/2 COVID PNA for which she was started on NRB->high flow, Antiviral, Decadron and Broad Spectrum ABx. Hospital course notable for New onset presymptomatic Bradycardia.

## 2020-10-06 NOTE — PROGRESS NOTE ADULT - SUBJECTIVE AND OBJECTIVE BOX
***Transfer Acceptance Note from MICU/Telemetry to [facility]***    HOSPITAL COURSE: Patient is a 65 F with PMH BPD, DM, recurrent UTIs on nitrofurantoin, hypothyroidism, who presented from NH with reported Fever to 104, cough and chills, found to have Hypoxic Respiratory Failure 2/2 COVID PNA for which she was started on NRB, Antiviral, Decadron and Broad Spectrum ABx. Hospital course notable for New onset Bradycardia, different from admission for which cardiology and CCU were consulted, however both in agreement that bradyarrhythmia was not pathological. EPS also called to evaluate for sinus bradycardia with HR 30 -40 BPM. Per EPS, bradycardia could be secondary to hypoxia and underlying infection, and therefore recommended holding off on transvenous pacing at this time. Also recommended starting Dobutamine 2.5 with goal to slowly uptitrate to 5 and monitor HR response. Pt stepped up to 3W COVID MICU for closer monitoring where arterial line and huitron were placed. Pt remained asymptomatic w/ HR ranging in 60-70; dobutamine was discontinued and the pt was deemed stable for stepdown to 3W COVID RMF.    SUBJECTIVE / INTERVAL HPI: No acute overnight events. Pt seen in AM at bedside, resting comfortably in bed, and does not appear to be in any acute distress. When asked, pt denies any recent or active fever, chills, nausea, vomiting, headache, acute sob, chest pain, abdominal pain, genitourinary sx, extremity pain or swelling.    VITAL SIGNS:  Vital Signs Last 24 Hrs  T(C): 36.2 (06 Oct 2020 09:00), Max: 37.2 (05 Oct 2020 18:23)  T(F): 97.2 (06 Oct 2020 09:00), Max: 99 (05 Oct 2020 18:23)  HR: 44 (06 Oct 2020 07:00) (43 - 76)  BP: 115/56 (06 Oct 2020 07:00) (105/58 - 136/63)  BP(mean): 85 (05 Oct 2020 22:00) (85 - 85)  RR: 21 (06 Oct 2020 08:00) (20 - 42)  SpO2: 87% (06 Oct 2020 07:00) (85% - 96%)    PHYSICAL EXAM:    General: WDWN  HEENT: NC/AT; PERRL, anicteric sclera; MMM  Neck: supple  Cardiovascular: +S1/S2, RRR  Respiratory: CTA B/L; no W/R/R  Gastrointestinal: soft, NT/ND; +BSx4  Extremities: WWP; no edema, clubbing or cyanosis  Vascular: 2+ radial, DP/PT pulses B/L  Neurological: AAOx3; no focal deficits    MEDICATIONS:  MEDICATIONS  (STANDING):  ARIPiprazole 10 milliGRAM(s) Oral daily  atorvastatin 20 milliGRAM(s) Oral at bedtime  cholecalciferol 1000 Unit(s) Oral daily  dexAMETHasone  Injectable 6 milliGRAM(s) IV Push every 24 hours  dextrose 5%. 1000 milliLiter(s) (50 mL/Hr) IV Continuous <Continuous>  dextrose 50% Injectable 12.5 Gram(s) IV Push once  dextrose 50% Injectable 25 Gram(s) IV Push once  dextrose 50% Injectable 25 Gram(s) IV Push once  diVALproex ER 1000 milliGRAM(s) Oral at bedtime  enoxaparin Injectable 40 milliGRAM(s) SubCutaneous every 12 hours  ferrous    sulfate 325 milliGRAM(s) Oral daily  influenza  Vaccine (HIGH DOSE) 0.7 milliLiter(s) IntraMuscular once  insulin glargine Injectable (LANTUS) 11 Unit(s) SubCutaneous at bedtime  insulin lispro (HumaLOG) corrective regimen sliding scale   SubCutaneous Before meals and at bedtime  insulin lispro Injectable (HumaLOG) 6 Unit(s) SubCutaneous three times a day before meals  liothyronine 25 MICROGram(s) Oral daily  pantoprazole    Tablet 40 milliGRAM(s) Oral before breakfast  PARoxetine 30 milliGRAM(s) Oral daily  polyethylene glycol 3350 17 Gram(s) Oral every 24 hours  QUEtiapine 400 milliGRAM(s) Oral at bedtime  remdesivir  IVPB 100 milliGRAM(s) IV Intermittent every 24 hours    MEDICATIONS  (PRN):  benzocaine 15 mG/menthol 3.6 mG (Sugar-Free) Lozenge 1 Lozenge Oral two times a day PRN Sore Throat  dextrose 40% Gel 15 Gram(s) Oral once PRN Blood Glucose LESS THAN 70 milliGRAM(s)/deciliter  glucagon  Injectable 1 milliGRAM(s) IntraMuscular once PRN Glucose LESS THAN 70 milligrams/deciliter      ALLERGIES:  Allergies    No Known Allergies    Intolerances        LABS:                        11.8   7.47  )-----------( 229      ( 06 Oct 2020 06:38 )             38.4     10-06    138  |  101  |  31<H>  ----------------------------<  222<H>  4.6   |  25  |  0.79    Ca    9.4      06 Oct 2020 06:38  Phos  4.5     10-06  Mg     2.2     10-06    TPro  6.8  /  Alb  3.3  /  TBili  0.4  /  DBili  x   /  AST  10  /  ALT  9<L>  /  AlkPhos  73  10-06        CAPILLARY BLOOD GLUCOSE      POCT Blood Glucose.: 198 mg/dL (06 Oct 2020 06:19)      RADIOLOGY & ADDITIONAL TESTS: Reviewed. ***Transfer Acceptance Note from MICU/Telemetry to [facility]***    HOSPITAL COURSE: Patient is a 65 F with PMH BPD, DM, recurrent UTIs on nitrofurantoin, hypothyroidism, who presented from NH with reported Fever to 104, cough and chills, found to have Hypoxic Respiratory Failure 2/2 COVID PNA for which she was started on NRB, Antiviral, Decadron and Broad Spectrum ABx. Hospital course notable for New onset Bradycardia, different from admission for which cardiology and CCU were consulted, however both in agreement that bradyarrhythmia was not pathological. EPS also called to evaluate for sinus bradycardia with HR 30 -40 BPM. Per EPS, bradycardia could be secondary to hypoxia and underlying infection, and therefore recommended holding off on transvenous pacing at this time. Also recommended starting Dobutamine 2.5 with goal to slowly uptitrate to 5 and monitor HR response. Pt stepped up to 3W COVID MICU for closer monitoring where arterial line and huitron were placed. Pt remained asymptomatic w/ HR ranging in 60-70; dobutamine was discontinued and the pt was deemed stable for stepdown to 3W COVID RMF.    SUBJECTIVE / INTERVAL HPI: No acute overnight events. Pt seen in AM at bedside, resting comfortably in bed, and does not appear to be in any acute distress. When asked, pt denies any recent or active fever, chills, nausea, vomiting, headache, acute sob, chest pain, abdominal pain, genitourinary sx, extremity pain or swelling.    O2 Requirements: HFNC 45L/40% with SpO2 90-93%    D-dimer: 6086 to 6208 (10/6)  CRP: 1.80 to 1.48 (10/6)  Ferritin: 190 to 161 (10/6)    VITAL SIGNS:  Vital Signs Last 24 Hrs  T(C): 36.2 (06 Oct 2020 09:00), Max: 37.2 (05 Oct 2020 18:23)  T(F): 97.2 (06 Oct 2020 09:00), Max: 99 (05 Oct 2020 18:23)  HR: 44 (06 Oct 2020 07:00) (43 - 76)  BP: 115/56 (06 Oct 2020 07:00) (105/58 - 136/63)  BP(mean): 85 (05 Oct 2020 22:00) (85 - 85)  RR: 21 (06 Oct 2020 08:00) (20 - 42)  SpO2: 87% (06 Oct 2020 07:00) (85% - 96%)    PHYSICAL EXAM:    General: WDWN  HEENT: NC/AT; PERRL, anicteric sclera; MMM  Neck: supple  Cardiovascular: +S1/S2, RRR  Respiratory: CTA B/L; no W/R/R  Gastrointestinal: soft, NT/ND; +BSx4  Extremities: WWP; no edema, clubbing or cyanosis  Vascular: 2+ radial, DP/PT pulses B/L  Neurological: AAOx3; no focal deficits    MEDICATIONS:  MEDICATIONS  (STANDING):  ARIPiprazole 10 milliGRAM(s) Oral daily  atorvastatin 20 milliGRAM(s) Oral at bedtime  cholecalciferol 1000 Unit(s) Oral daily  dexAMETHasone  Injectable 6 milliGRAM(s) IV Push every 24 hours  dextrose 5%. 1000 milliLiter(s) (50 mL/Hr) IV Continuous <Continuous>  dextrose 50% Injectable 12.5 Gram(s) IV Push once  dextrose 50% Injectable 25 Gram(s) IV Push once  dextrose 50% Injectable 25 Gram(s) IV Push once  diVALproex ER 1000 milliGRAM(s) Oral at bedtime  enoxaparin Injectable 40 milliGRAM(s) SubCutaneous every 12 hours  ferrous    sulfate 325 milliGRAM(s) Oral daily  influenza  Vaccine (HIGH DOSE) 0.7 milliLiter(s) IntraMuscular once  insulin glargine Injectable (LANTUS) 11 Unit(s) SubCutaneous at bedtime  insulin lispro (HumaLOG) corrective regimen sliding scale   SubCutaneous Before meals and at bedtime  insulin lispro Injectable (HumaLOG) 6 Unit(s) SubCutaneous three times a day before meals  liothyronine 25 MICROGram(s) Oral daily  pantoprazole    Tablet 40 milliGRAM(s) Oral before breakfast  PARoxetine 30 milliGRAM(s) Oral daily  polyethylene glycol 3350 17 Gram(s) Oral every 24 hours  QUEtiapine 400 milliGRAM(s) Oral at bedtime  remdesivir  IVPB 100 milliGRAM(s) IV Intermittent every 24 hours    MEDICATIONS  (PRN):  benzocaine 15 mG/menthol 3.6 mG (Sugar-Free) Lozenge 1 Lozenge Oral two times a day PRN Sore Throat  dextrose 40% Gel 15 Gram(s) Oral once PRN Blood Glucose LESS THAN 70 milliGRAM(s)/deciliter  glucagon  Injectable 1 milliGRAM(s) IntraMuscular once PRN Glucose LESS THAN 70 milligrams/deciliter      ALLERGIES:  Allergies    No Known Allergies    Intolerances        LABS:                        11.8   7.47  )-----------( 229      ( 06 Oct 2020 06:38 )             38.4     10-06    138  |  101  |  31<H>  ----------------------------<  222<H>  4.6   |  25  |  0.79    Ca    9.4      06 Oct 2020 06:38  Phos  4.5     10-06  Mg     2.2     10-06    TPro  6.8  /  Alb  3.3  /  TBili  0.4  /  DBili  x   /  AST  10  /  ALT  9<L>  /  AlkPhos  73  10-06        CAPILLARY BLOOD GLUCOSE      POCT Blood Glucose.: 198 mg/dL (06 Oct 2020 06:19)      RADIOLOGY & ADDITIONAL TESTS: Reviewed. ***Transfer Acceptance Note from MICU/Telemetry to [facility]***    HOSPITAL COURSE: Patient is a 65 F with PMH BPD, DM, recurrent UTIs on nitrofurantoin, hypothyroidism, who presented from NH with reported Fever to 104, cough and chills, found to have Hypoxic Respiratory Failure 2/2 COVID PNA for which she was started on NRB, Antiviral, Decadron and Broad Spectrum ABx. Hospital course notable for New onset Bradycardia, different from admission for which cardiology and CCU were consulted, however both in agreement that bradyarrhythmia was not pathological. EPS also called to evaluate for sinus bradycardia with HR 30 -40 BPM. Per EPS, bradycardia could be secondary to hypoxia and underlying infection, and therefore recommended holding off on transvenous pacing at this time. Also recommended starting Dobutamine 2.5 with goal to slowly uptitrate to 5 and monitor HR response. Pt stepped up to 3W COVID MICU for closer monitoring where arterial line and huitron were placed. Pt remained asymptomatic w/ HR ranging in 60-70; dobutamine was discontinued and the pt was deemed stable for stepdown to 3W COVID RMF.    SUBJECTIVE / INTERVAL HPI: No acute overnight events. Pt seen in AM at bedside, resting comfortably in bed, and does not appear to be in any acute distress. When asked, pt denies any recent or active fever, chills, nausea, vomiting, headache, acute sob, chest pain, abdominal pain, genitourinary sx, extremity pain or swelling.    O2 Requirements: NC 6L with SpO2 90-93%    D-dimer: 6086 to 6208 (10/6)  CRP: 1.80 to 1.48 (10/6)  Ferritin: 190 to 161 (10/6)    VITAL SIGNS:  Vital Signs Last 24 Hrs  T(C): 36.2 (06 Oct 2020 09:00), Max: 37.2 (05 Oct 2020 18:23)  T(F): 97.2 (06 Oct 2020 09:00), Max: 99 (05 Oct 2020 18:23)  HR: 44 (06 Oct 2020 07:00) (43 - 76)  BP: 115/56 (06 Oct 2020 07:00) (105/58 - 136/63)  BP(mean): 85 (05 Oct 2020 22:00) (85 - 85)  RR: 21 (06 Oct 2020 08:00) (20 - 42)  SpO2: 87% (06 Oct 2020 07:00) (85% - 96%)    PHYSICAL EXAM:    General: WDWN  HEENT: NC/AT; PERRL, anicteric sclera; MMM  Neck: supple  Cardiovascular: +S1/S2, RRR  Respiratory: CTA B/L; no W/R/R  Gastrointestinal: soft, NT/ND; +BSx4  Extremities: WWP; no edema, clubbing or cyanosis  Vascular: 2+ radial, DP/PT pulses B/L  Neurological: AAOx3; no focal deficits    MEDICATIONS:  MEDICATIONS  (STANDING):  ARIPiprazole 10 milliGRAM(s) Oral daily  atorvastatin 20 milliGRAM(s) Oral at bedtime  cholecalciferol 1000 Unit(s) Oral daily  dexAMETHasone  Injectable 6 milliGRAM(s) IV Push every 24 hours  dextrose 5%. 1000 milliLiter(s) (50 mL/Hr) IV Continuous <Continuous>  dextrose 50% Injectable 12.5 Gram(s) IV Push once  dextrose 50% Injectable 25 Gram(s) IV Push once  dextrose 50% Injectable 25 Gram(s) IV Push once  diVALproex ER 1000 milliGRAM(s) Oral at bedtime  enoxaparin Injectable 40 milliGRAM(s) SubCutaneous every 12 hours  ferrous    sulfate 325 milliGRAM(s) Oral daily  influenza  Vaccine (HIGH DOSE) 0.7 milliLiter(s) IntraMuscular once  insulin glargine Injectable (LANTUS) 11 Unit(s) SubCutaneous at bedtime  insulin lispro (HumaLOG) corrective regimen sliding scale   SubCutaneous Before meals and at bedtime  insulin lispro Injectable (HumaLOG) 6 Unit(s) SubCutaneous three times a day before meals  liothyronine 25 MICROGram(s) Oral daily  pantoprazole    Tablet 40 milliGRAM(s) Oral before breakfast  PARoxetine 30 milliGRAM(s) Oral daily  polyethylene glycol 3350 17 Gram(s) Oral every 24 hours  QUEtiapine 400 milliGRAM(s) Oral at bedtime  remdesivir  IVPB 100 milliGRAM(s) IV Intermittent every 24 hours    MEDICATIONS  (PRN):  benzocaine 15 mG/menthol 3.6 mG (Sugar-Free) Lozenge 1 Lozenge Oral two times a day PRN Sore Throat  dextrose 40% Gel 15 Gram(s) Oral once PRN Blood Glucose LESS THAN 70 milliGRAM(s)/deciliter  glucagon  Injectable 1 milliGRAM(s) IntraMuscular once PRN Glucose LESS THAN 70 milligrams/deciliter      ALLERGIES:  Allergies    No Known Allergies    Intolerances        LABS:                        11.8   7.47  )-----------( 229      ( 06 Oct 2020 06:38 )             38.4     10-06    138  |  101  |  31<H>  ----------------------------<  222<H>  4.6   |  25  |  0.79    Ca    9.4      06 Oct 2020 06:38  Phos  4.5     10-06  Mg     2.2     10-06    TPro  6.8  /  Alb  3.3  /  TBili  0.4  /  DBili  x   /  AST  10  /  ALT  9<L>  /  AlkPhos  73  10-06        CAPILLARY BLOOD GLUCOSE      POCT Blood Glucose.: 198 mg/dL (06 Oct 2020 06:19)      RADIOLOGY & ADDITIONAL TESTS: Reviewed.

## 2020-10-06 NOTE — PROGRESS NOTE ADULT - PROBLEM SELECTOR PLAN 9
[RESOLVED] pt presented to ED with severe sepsis: lactate 3 (resolved to 0.9 after 500cc bolus), T 101.4, HR 93, RR 24. CXR showing b/l patchy infiltrates. Pt COVID+ x2 with CRP 7.57, however procalcitonin 0.32. PNA likely 2/2 COVID vs. bacterial PNA. Received Tylenol 1g, 500cc NS, Vancomycin 1750mg, Zosyn 3.375g.   -sepsis now resolved, currently admitted to 3wo and being treated for COVID  -lactate resolved from 3 to 0.9  -will not continue Vancomycin and Zosyn at this time, consider restarting as needed  -f/u blood cultures from 9/29- no growth to date Topical Sulfur Applications Pregnancy And Lactation Text: This medication is Pregnancy Category C and has an unknown safety profile during pregnancy. It is unknown if this topical medication is excreted in breast milk.

## 2020-10-06 NOTE — CHART NOTE - NSCHARTNOTEFT_GEN_A_CORE
Admitting Diagnosis:   Patient is a 65y old  Female who presents with a chief complaint of shortness of breath (06 Oct 2020 11:11).    PAST MEDICAL & SURGICAL HISTORY:  Bipolar disorder    Hypothyroid    T2DM (type 2 diabetes mellitus)      Current Nutrition Order: Consistent Carbohydrate w/o snacks- Kosher    PO Intake: Good (%) [   ]  Fair (50-75%) [  x ] Poor (<25%) [   ]  Per RN, pt had breakfast this morning (6-Oct) and not had lunch yet. Pt has been eating well since previous dietary assessment.    GI Issues: Per RN pt had BM this morning (6 Oct), No N/V/C/D    Pain: Per RN, no complaints of pain.    Skin Integrity: no edema, no pressure injuries noted at this time. Jonas score:16    Labs:   10-06    138  |  101  |  31<H>  ----------------------------<  222<H>  4.6   |  25  |  0.79    Ca    9.4      06 Oct 2020 06:38  Phos  4.5     10-06  Mg     2.2     10-06    TPro  6.8  /  Alb  3.3  /  TBili  0.4  /  DBili  x   /  AST  10  /  ALT  9<L>  /  AlkPhos  73  10-06    CAPILLARY BLOOD GLUCOSE  POCT Blood Glucose.: 309 mg/dL (06 Oct 2020 11:35)  POCT Blood Glucose.: 198 mg/dL (06 Oct 2020 06:19)  POCT Blood Glucose.: 107 mg/dL (05 Oct 2020 22:05)  POCT Blood Glucose.: 158 mg/dL (05 Oct 2020 16:47)      Medications:  MEDICATIONS  (STANDING):  ARIPiprazole 10 milliGRAM(s) Oral daily  atorvastatin 20 milliGRAM(s) Oral at bedtime  cholecalciferol 1000 Unit(s) Oral daily  dexAMETHasone  Injectable 6 milliGRAM(s) IV Push every 24 hours  dextrose 5%. 1000 milliLiter(s) (50 mL/Hr) IV Continuous <Continuous>  dextrose 50% Injectable 12.5 Gram(s) IV Push once  dextrose 50% Injectable 25 Gram(s) IV Push once  dextrose 50% Injectable 25 Gram(s) IV Push once  diVALproex ER 1000 milliGRAM(s) Oral at bedtime  enoxaparin Injectable 40 milliGRAM(s) SubCutaneous every 12 hours  ferrous    sulfate 325 milliGRAM(s) Oral daily  influenza  Vaccine (HIGH DOSE) 0.7 milliLiter(s) IntraMuscular once  insulin glargine Injectable (LANTUS) 11 Unit(s) SubCutaneous at bedtime  insulin lispro (HumaLOG) corrective regimen sliding scale   SubCutaneous Before meals and at bedtime  insulin lispro Injectable (HumaLOG) 6 Unit(s) SubCutaneous three times a day before meals  liothyronine 25 MICROGram(s) Oral daily  pantoprazole    Tablet 40 milliGRAM(s) Oral before breakfast  PARoxetine 30 milliGRAM(s) Oral daily  polyethylene glycol 3350 17 Gram(s) Oral every 24 hours  QUEtiapine 400 milliGRAM(s) Oral at bedtime  remdesivir  IVPB 100 milliGRAM(s) IV Intermittent every 24 hours    MEDICATIONS  (PRN):  benzocaine 15 mG/menthol 3.6 mG (Sugar-Free) Lozenge 1 Lozenge Oral two times a day PRN Sore Throat  dextrose 40% Gel 15 Gram(s) Oral once PRN Blood Glucose LESS THAN 70 milliGRAM(s)/deciliter  glucagon  Injectable 1 milliGRAM(s) IntraMuscular once PRN Glucose LESS THAN 70 milligrams/deciliter      Weight: ABW: 249lbs/113kg  Weight Change: No new weights since admission. Please continue to trend.    Estimated energy needs:   Ideal body weight (56.8kg) used for calculations as pt >120% of IBW. Needs adjusted for age, BMI, hypermetabolic state 2/2 COVID infection  Energy: 1420-1704kcal/day (25-30kcal/kg)  Protein: 68-80g pro/day (1.2-1.4g pro/kg)  Fluids per team.    Subjective:   65F w PMHx bipolar disorder, PD, HLD, T2DM, anemia, multiple UTIs on chronic nitrofurantoin, hypothyroidism presenting from nursing home with cough and fever, admitted to [facility] for management of sepsis 2/2 COVID19 PNA vs bacterial PNA. Sepsis now resolved. Pt. was on 3-6 L NC oxygen o/n, saturating in the low 90%. Pt now breathing on Venturi mask 50L and saturating in the high 80%s.     Unable to conduct a face to face interview or nutrition-focused physical exam due to limited contact restrictions related to the pt's medical condition and isolation precautions. Unable to reach pt due to disconnected number. Per RN, pt eating is eating well with no issues, no observed chewing or swallowing issues. NKFA per chart. RN denies pt with n/v/c/d. Last BM noted 6 Oct. Please see nutrition recommendations below, RD to monitor and f/u per protocol.     Previous Nutrition Diagnosis:  Increased nutrient needs RT increased demand for energy and protein AEB hypermetabolic state 2/2 viral infection (COVID19+)     Active [ X  ]  Resolved [   ]    Goal: pt to continuously meet >75% estimated nutrient needs PO with good tolerance.    Recommendations:  1) Continue with current Consistent Carbohydrate w/o snacks, Kosher diet. Monitor for diet tolerance.  2) POCT q6hrs, maintain tight BG control.  3) Monitor CBC, CrP, Ferritin, lytes and replete prn   4) Continue with current micronutrient supplementation, at team discretion  5) Trend weights.  6) Pain and bowel regimen per team   7) Fluids per team    Education: N/A at this time.    Risk Level: High [   ] Moderate [ X  ] Low [   ] Admitting Diagnosis:   Patient is a 65y old  Female who presents with a chief complaint of shortness of breath (06 Oct 2020 11:11).    PAST MEDICAL & SURGICAL HISTORY:  Bipolar disorder    Hypothyroid    T2DM (type 2 diabetes mellitus)      Current Nutrition Order: Consistent Carbohydrate w/o snacks- Kosher    PO Intake: Good (%) [   ]  Fair (50-75%) [  x ] Poor (<25%) [   ]  Per RN, pt had breakfast this morning (6-Oct) and not had lunch yet. Pt has been eating well since previous dietary assessment.    GI Issues: Per RN pt had BM this morning (6 Oct), No N/V/C/D    Pain: Per RN, no complaints of pain.     Skin Integrity: no edema, no pressure injuries noted at this time. Jonas score:16    Labs:   10-06    138  |  101  |  31<H>  ----------------------------<  222<H>  4.6   |  25  |  0.79    Ca    9.4      06 Oct 2020 06:38  Phos  4.5     10-06  Mg     2.2     10-06    TPro  6.8  /  Alb  3.3  /  TBili  0.4  /  DBili  x   /  AST  10  /  ALT  9<L>  /  AlkPhos  73  10-06    CAPILLARY BLOOD GLUCOSE  POCT Blood Glucose.: 309 mg/dL (06 Oct 2020 11:35)  POCT Blood Glucose.: 198 mg/dL (06 Oct 2020 06:19)  POCT Blood Glucose.: 107 mg/dL (05 Oct 2020 22:05)  POCT Blood Glucose.: 158 mg/dL (05 Oct 2020 16:47)      Medications:  MEDICATIONS  (STANDING):  ARIPiprazole 10 milliGRAM(s) Oral daily  atorvastatin 20 milliGRAM(s) Oral at bedtime  cholecalciferol 1000 Unit(s) Oral daily  dexAMETHasone  Injectable 6 milliGRAM(s) IV Push every 24 hours  dextrose 5%. 1000 milliLiter(s) (50 mL/Hr) IV Continuous <Continuous>  dextrose 50% Injectable 12.5 Gram(s) IV Push once  dextrose 50% Injectable 25 Gram(s) IV Push once  dextrose 50% Injectable 25 Gram(s) IV Push once  diVALproex ER 1000 milliGRAM(s) Oral at bedtime  enoxaparin Injectable 40 milliGRAM(s) SubCutaneous every 12 hours  ferrous    sulfate 325 milliGRAM(s) Oral daily  influenza  Vaccine (HIGH DOSE) 0.7 milliLiter(s) IntraMuscular once  insulin glargine Injectable (LANTUS) 11 Unit(s) SubCutaneous at bedtime  insulin lispro (HumaLOG) corrective regimen sliding scale   SubCutaneous Before meals and at bedtime  insulin lispro Injectable (HumaLOG) 6 Unit(s) SubCutaneous three times a day before meals  liothyronine 25 MICROGram(s) Oral daily  pantoprazole    Tablet 40 milliGRAM(s) Oral before breakfast  PARoxetine 30 milliGRAM(s) Oral daily  polyethylene glycol 3350 17 Gram(s) Oral every 24 hours  QUEtiapine 400 milliGRAM(s) Oral at bedtime  remdesivir  IVPB 100 milliGRAM(s) IV Intermittent every 24 hours    MEDICATIONS  (PRN):  benzocaine 15 mG/menthol 3.6 mG (Sugar-Free) Lozenge 1 Lozenge Oral two times a day PRN Sore Throat  dextrose 40% Gel 15 Gram(s) Oral once PRN Blood Glucose LESS THAN 70 milliGRAM(s)/deciliter  glucagon  Injectable 1 milliGRAM(s) IntraMuscular once PRN Glucose LESS THAN 70 milligrams/deciliter      Weight: ABW: 249lbs/113kg  Weight Change: No new weights since admission. Please continue to trend.    Estimated energy needs:   Ideal body weight (56.8kg) used for calculations as pt >120% of IBW. Needs adjusted for age, BMI, hypermetabolic state 2/2 COVID infection  Energy: 1420-1704kcal/day (25-30kcal/kg)  Protein: 68-80g pro/day (1.2-1.4g pro/kg)  Fluids per team.    Subjective:   65F w PMHx bipolar disorder, PD, HLD, T2DM, anemia, multiple UTIs on chronic nitrofurantoin, hypothyroidism presenting from nursing home with cough and fever, admitted to [facility] for management of sepsis 2/2 COVID19 PNA vs bacterial PNA. Sepsis now resolved. Pt. was on 3-6 L NC oxygen o/n, saturating in the low 90%. Pt now breathing on Venturi mask 50L and saturating in the high 80%s.     Unable to conduct a face to face interview or nutrition-focused physical exam due to limited contact restrictions related to the pt's medical condition and isolation precautions. Unable to reach pt due to disconnected number. Per RN, pt eating is eating well with no issues, no observed chewing or swallowing issues. NKFA per chart. RN denies pt with n/v/c/d. Last BM noted 6 Oct. Please see nutrition recommendations below, RD to monitor and f/u per protocol.     Previous Nutrition Diagnosis:  Increased nutrient needs RT increased demand for energy and protein AEB hypermetabolic state 2/2 viral infection (COVID19+)     Active [ X  ]  Resolved [   ]    Goal: pt to continuously meet >75% estimated nutrient needs PO with good tolerance.    Recommendations:  1) Continue with current Consistent Carbohydrate w/o snacks, Kosher diet. Monitor for diet tolerance.  2) POCT q6hrs, maintain tight BG control.  3) Monitor CBC, CrP, Ferritin, lytes and replete prn   4) Continue with current micronutrient supplementation, at team discretion  5) Trend weights.  6) Pain and bowel regimen per team   7) Fluids per team    Education: N/A at this time.    Risk Level: High [   ] Moderate [ X  ] Low [   ] Admitting Diagnosis:   Patient is a 65y old  Female who presents with a chief complaint of shortness of breath (06 Oct 2020 11:11).    PAST MEDICAL & SURGICAL HISTORY:  Bipolar disorder    Hypothyroid    T2DM (type 2 diabetes mellitus)      Current Nutrition Order: Consistent Carbohydrate w/o snacks- Kosher    PO Intake: Good (%) [   ]  Fair (50-75%) [  x ] Poor (<25%) [   ]  Per RN, pt had breakfast this morning (10/6) and not had lunch yet. Pt has been eating well since previous dietary assessment.    GI Issues: Per RN pt had BM this morning (10/6), No N/V/C/D    Pain: Per RN, no complaints of pain.     Skin Integrity:   No edema  No pressure injuries noted at this time   Jonas score:16    Labs:   10-06    138  |  101  |  31<H>  ----------------------------<  222<H>  4.6   |  25  |  0.79    Ca    9.4      06 Oct 2020 06:38  Phos  4.5     10-06  Mg     2.2     10-06    TPro  6.8  /  Alb  3.3  /  TBili  0.4  /  DBili  x   /  AST  10  /  ALT  9<L>  /  AlkPhos  73  10-06    CAPILLARY BLOOD GLUCOSE  POCT Blood Glucose.: 309 mg/dL (06 Oct 2020 11:35)  POCT Blood Glucose.: 198 mg/dL (06 Oct 2020 06:19)  POCT Blood Glucose.: 107 mg/dL (05 Oct 2020 22:05)  POCT Blood Glucose.: 158 mg/dL (05 Oct 2020 16:47)      Medications:  MEDICATIONS  (STANDING):  ARIPiprazole 10 milliGRAM(s) Oral daily  atorvastatin 20 milliGRAM(s) Oral at bedtime  cholecalciferol 1000 Unit(s) Oral daily  dexAMETHasone  Injectable 6 milliGRAM(s) IV Push every 24 hours  dextrose 5%. 1000 milliLiter(s) (50 mL/Hr) IV Continuous <Continuous>  dextrose 50% Injectable 12.5 Gram(s) IV Push once  dextrose 50% Injectable 25 Gram(s) IV Push once  dextrose 50% Injectable 25 Gram(s) IV Push once  diVALproex ER 1000 milliGRAM(s) Oral at bedtime  enoxaparin Injectable 40 milliGRAM(s) SubCutaneous every 12 hours  ferrous    sulfate 325 milliGRAM(s) Oral daily  influenza  Vaccine (HIGH DOSE) 0.7 milliLiter(s) IntraMuscular once  insulin glargine Injectable (LANTUS) 11 Unit(s) SubCutaneous at bedtime  insulin lispro (HumaLOG) corrective regimen sliding scale   SubCutaneous Before meals and at bedtime  insulin lispro Injectable (HumaLOG) 6 Unit(s) SubCutaneous three times a day before meals  liothyronine 25 MICROGram(s) Oral daily  pantoprazole    Tablet 40 milliGRAM(s) Oral before breakfast  PARoxetine 30 milliGRAM(s) Oral daily  polyethylene glycol 3350 17 Gram(s) Oral every 24 hours  QUEtiapine 400 milliGRAM(s) Oral at bedtime  remdesivir  IVPB 100 milliGRAM(s) IV Intermittent every 24 hours    MEDICATIONS  (PRN):  benzocaine 15 mG/menthol 3.6 mG (Sugar-Free) Lozenge 1 Lozenge Oral two times a day PRN Sore Throat  dextrose 40% Gel 15 Gram(s) Oral once PRN Blood Glucose LESS THAN 70 milliGRAM(s)/deciliter  glucagon  Injectable 1 milliGRAM(s) IntraMuscular once PRN Glucose LESS THAN 70 milligrams/deciliter      Weight: ABW: 249lbs/113kg    Weight Change: No new weights since admission. Please continue to trend.    Estimated energy needs:   ABW 113kg, IBW 56.8kg, 199% IBW, ht 65", BMI 41.5   Ideal body weight (56.8kg) used for calculations as pt >120% of IBW. Needs adjusted for age, BMI, hypermetabolic state 2/2 COVID infection/ respiratory status   Energy: 1420-1704kcal/day (25-30kcal/kg)  Protein: 68-80g pro/day (1.2-1.4g pro/kg)  Fluids per team.    Subjective:   65F w PMHx bipolar disorder, PD, HLD, T2DM (A1C 6.9), anemia, multiple UTIs on chronic nitrofurantoin, hypothyroidism presenting from nursing home with cough and fever, admitted to [facility] for management of sepsis 2/2 COVID19 PNA vs bacterial PNA. Sepsis now resolved. Pt on 3-6 L NC oxygen o/n, saturating in the low 90%. This AM pt on HFNC @50L, switched NRB, this afternoon pt now breathing on Venturi mask 50L and saturating in the high 80%. Deemed stable to step down to [facility] care from [facility]U today 10/6. Continues on abx regimen and decadron at this time.     Unable to conduct a face to face interview or nutrition-focused physical exam due to limited contact restrictions related to the pt's medical condition and isolation precautions. Unable to reach pt due to disconnected number. Per RN, pt is eating well with no issues, per RN no chewing or swallowing issues, tolerating diet well at this time. NKFA per chart. RN denies pt with n/v/c/d. Last BM noted today 10/6. Please see nutrition recommendations below, RD to monitor and f/u per protocol.     Previous Nutrition Diagnosis:  Increased nutrient needs RT increased demand for energy and protein AEB hypermetabolic state 2/2 viral infection (COVID19+)     Active [ X  ]  Resolved [   ]    Goal: pt to continuously meet >75% estimated nutrient needs PO with good tolerance.    Recommendations:  1) Continue with current Consistent Carbohydrate w/o snacks, Kosher diet. Monitor for diet tolerance.  2) POCT q6hrs, maintain tight BG control.  3) Monitor CBC, CrP, Ferritin, lytes and replete prn   4) Continue with current micronutrient supplementation, at team discretion  5) Trend weights.  6) Pain and bowel regimen per team   7) Fluids per team    Education: attempted to reach pt via phone# provided in pt chart however number disconnected, will attempt again at later date as feasible     Risk Level: High [   ] Moderate [ X  ] Low [   ]

## 2020-10-06 NOTE — PROGRESS NOTE ADULT - PROBLEM SELECTOR PLAN 2
Pt observed to have HR <50 overnight 9/30 and 10/1. Reported chest discomfort earlier on 10/1 but currently denies chest pain or palpitations. Denies prior history of bradycardia. EKG shows sinus bradycardia. Currently HR b/t 30-40s. HR improving since initiating dobutamine gtt (10/2). Arterial line placed 10/2.  - Pt has now been off of dobutamine since 10/5  - c/w maintaining Atropine at bedside and Pacer pads on  - f/u CXR

## 2020-10-06 NOTE — PROGRESS NOTE ADULT - PROBLEM SELECTOR PLAN 2
Pt observed to have HR <50 overnight 9/30 and 10/1. Reported chest discomfort earlier on 10/1 but currently denies chest pain or palpitations. Denies prior history of bradycardia. EKG shows sinus bradycardia. Currently HR b/t 30-40s. HR improving since initiating dobutamine gtt (10/2). Arterial line placed 10/2.  - off dobutamine since 10/5  - will keep Atropine at bedside and Pacer pads on  - f/u CXR

## 2020-10-06 NOTE — PROGRESS NOTE ADULT - SUBJECTIVE AND OBJECTIVE BOX
Transfer Note - [facility] MICU to [facility] COURSE: Patient is a 65 F with PMH BPD, DM, recurrent UTIs on nitrofurantoin, hypothyroidism, who presented from NH with reported Fever to 104, cough and chills, found to have Hypoxic Respiratory Failure 2/2 COVID PNA for which she was started on NRB, Antiviral, Decadron and Broad Spectrum ABx. Hospital course notable for New onset Bradycardia, different from admission for which cardiology and CCU were consulted, however both in agreement that bradyarrhythmia was not pathological. EPS also called to evaluate for sinus bradycardia with HR 30 -40 BPM. Per EPS, bradycardia could be secondary to hypoxia and underlying infection, and therefore recommended holding off on transvenous pacing at this time. Also recommended starting Dobutamine 2.5 with goal to slowly uptitrate to 5 and monitor HR response. Pt stepped up to 3[facility] MICU for closer monitoring where arterial line and huitron were placed.      SUBJECTIVE/OVERNIGHT EVENTS: No acute overnight events. Pt seen in AM at bedside, resting comfortably in bed, and does not appear to be in any acute distress. When asked, pt denies any recent or active fever, chills, nausea, vomiting, headache, acute sob, chest pain, abdominal pain, genitourinary sx, extremity pain or swelling.    VITAL SIGNS:  Vital Signs Last 24 Hrs  T(C): 36.2 (06 Oct 2020 09:00), Max: 37.2 (05 Oct 2020 18:23)  T(F): 97.2 (06 Oct 2020 09:00), Max: 99 (05 Oct 2020 18:23)  HR: 44 (06 Oct 2020 07:00) (43 - 76)  BP: 115/56 (06 Oct 2020 07:00) (105/58 - 136/63)  BP(mean): 85 (05 Oct 2020 22:00) (85 - 85)  RR: 21 (06 Oct 2020 08:00) (20 - 42)  SpO2: 87% (06 Oct 2020 07:00) (85% - 96%)    PHYSICAL EXAM:  General: NAD; speaking in full sentences  HEENT: NC/AT; PERRL; EOMI; MMM  Neck: supple; no JVD  Cardiac: +bradycardia; +S1/S2  Pulm: +breathing on high flow NC; otherwise CTA B/L; no W/R/R  GI: soft, NT/ND, +BS  Extremities: WWP; no edema, clubbing or cyanosis  Vasc: 2+ radial, DP pulses B/L  Neuro: AAOx3; no focal deficits    MEDICATIONS:  MEDICATIONS  (STANDING):  ARIPiprazole 10 milliGRAM(s) Oral daily  atorvastatin 20 milliGRAM(s) Oral at bedtime  cholecalciferol 1000 Unit(s) Oral daily  dexAMETHasone  Injectable 6 milliGRAM(s) IV Push every 24 hours  dextrose 5%. 1000 milliLiter(s) (50 mL/Hr) IV Continuous <Continuous>  dextrose 50% Injectable 12.5 Gram(s) IV Push once  dextrose 50% Injectable 25 Gram(s) IV Push once  dextrose 50% Injectable 25 Gram(s) IV Push once  diVALproex ER 1000 milliGRAM(s) Oral at bedtime  enoxaparin Injectable 40 milliGRAM(s) SubCutaneous every 12 hours  ferrous    sulfate 325 milliGRAM(s) Oral daily  influenza  Vaccine (HIGH DOSE) 0.7 milliLiter(s) IntraMuscular once  insulin glargine Injectable (LANTUS) 11 Unit(s) SubCutaneous at bedtime  insulin lispro (HumaLOG) corrective regimen sliding scale   SubCutaneous Before meals and at bedtime  insulin lispro Injectable (HumaLOG) 6 Unit(s) SubCutaneous three times a day before meals  liothyronine 25 MICROGram(s) Oral daily  pantoprazole    Tablet 40 milliGRAM(s) Oral before breakfast  PARoxetine 30 milliGRAM(s) Oral daily  polyethylene glycol 3350 17 Gram(s) Oral every 24 hours  QUEtiapine 400 milliGRAM(s) Oral at bedtime  remdesivir  IVPB 100 milliGRAM(s) IV Intermittent every 24 hours    MEDICATIONS  (PRN):  benzocaine 15 mG/menthol 3.6 mG (Sugar-Free) Lozenge 1 Lozenge Oral two times a day PRN Sore Throat  dextrose 40% Gel 15 Gram(s) Oral once PRN Blood Glucose LESS THAN 70 milliGRAM(s)/deciliter  glucagon  Injectable 1 milliGRAM(s) IntraMuscular once PRN Glucose LESS THAN 70 milligrams/deciliter      ALLERGIES:  Allergies    No Known Allergies    Intolerances        LABS:                        11.8   7.47  )-----------( 229      ( 06 Oct 2020 06:38 )             38.4     10-06    138  |  101  |  31<H>  ----------------------------<  222<H>  4.6   |  25  |  0.79    Ca    9.4      06 Oct 2020 06:38  Phos  4.5     10-06  Mg     2.2     10-06    TPro  6.8  /  Alb  3.3  /  TBili  0.4  /  DBili  x   /  AST  10  /  ALT  9<L>  /  AlkPhos  73  10-06        RADIOLOGY & ADDITIONAL TESTS: Reviewed. Transfer Note - [facility] MICU to [facility] COURSE: Patient is a 65 F with PMH BPD, DM, recurrent UTIs on nitrofurantoin, hypothyroidism, who presented from NH with reported Fever to 104, cough and chills, found to have Hypoxic Respiratory Failure 2/2 COVID PNA for which she was started on NRB, Antiviral, Decadron and Broad Spectrum ABx. Hospital course notable for New onset Bradycardia, different from admission for which cardiology and CCU were consulted, however both in agreement that bradyarrhythmia was not pathological. EPS also called to evaluate for sinus bradycardia with HR 30 -40 BPM. Per EPS, bradycardia could be secondary to hypoxia and underlying infection, and therefore recommended holding off on transvenous pacing at this time. Also recommended starting Dobutamine 2.5 with goal to slowly uptitrate to 5 and monitor HR response. Pt stepped up to [facility] MICU for closer monitoring where arterial line and huitron were placed. Pt remained asymptomatic w/ HR ranging b/t       SUBJECTIVE/OVERNIGHT EVENTS: No acute overnight events. Pt seen in AM at bedside, resting comfortably in bed, and does not appear to be in any acute distress. When asked, pt denies any recent or active fever, chills, nausea, vomiting, headache, acute sob, chest pain, abdominal pain, genitourinary sx, extremity pain or swelling.    VITAL SIGNS:  Vital Signs Last 24 Hrs  T(C): 36.2 (06 Oct 2020 09:00), Max: 37.2 (05 Oct 2020 18:23)  T(F): 97.2 (06 Oct 2020 09:00), Max: 99 (05 Oct 2020 18:23)  HR: 44 (06 Oct 2020 07:00) (43 - 76)  BP: 115/56 (06 Oct 2020 07:00) (105/58 - 136/63)  BP(mean): 85 (05 Oct 2020 22:00) (85 - 85)  RR: 21 (06 Oct 2020 08:00) (20 - 42)  SpO2: 87% (06 Oct 2020 07:00) (85% - 96%)    PHYSICAL EXAM:  General: NAD; speaking in full sentences  HEENT: NC/AT; PERRL; EOMI; MMM  Neck: supple; no JVD  Cardiac: +bradycardia; +S1/S2  Pulm: +breathing on high flow NC; otherwise CTA B/L; no W/R/R  GI: soft, NT/ND, +BS  Extremities: WWP; no edema, clubbing or cyanosis  Vasc: 2+ radial, DP pulses B/L  Neuro: AAOx3; no focal deficits    MEDICATIONS:  MEDICATIONS  (STANDING):  ARIPiprazole 10 milliGRAM(s) Oral daily  atorvastatin 20 milliGRAM(s) Oral at bedtime  cholecalciferol 1000 Unit(s) Oral daily  dexAMETHasone  Injectable 6 milliGRAM(s) IV Push every 24 hours  dextrose 5%. 1000 milliLiter(s) (50 mL/Hr) IV Continuous <Continuous>  dextrose 50% Injectable 12.5 Gram(s) IV Push once  dextrose 50% Injectable 25 Gram(s) IV Push once  dextrose 50% Injectable 25 Gram(s) IV Push once  diVALproex ER 1000 milliGRAM(s) Oral at bedtime  enoxaparin Injectable 40 milliGRAM(s) SubCutaneous every 12 hours  ferrous    sulfate 325 milliGRAM(s) Oral daily  influenza  Vaccine (HIGH DOSE) 0.7 milliLiter(s) IntraMuscular once  insulin glargine Injectable (LANTUS) 11 Unit(s) SubCutaneous at bedtime  insulin lispro (HumaLOG) corrective regimen sliding scale   SubCutaneous Before meals and at bedtime  insulin lispro Injectable (HumaLOG) 6 Unit(s) SubCutaneous three times a day before meals  liothyronine 25 MICROGram(s) Oral daily  pantoprazole    Tablet 40 milliGRAM(s) Oral before breakfast  PARoxetine 30 milliGRAM(s) Oral daily  polyethylene glycol 3350 17 Gram(s) Oral every 24 hours  QUEtiapine 400 milliGRAM(s) Oral at bedtime  remdesivir  IVPB 100 milliGRAM(s) IV Intermittent every 24 hours    MEDICATIONS  (PRN):  benzocaine 15 mG/menthol 3.6 mG (Sugar-Free) Lozenge 1 Lozenge Oral two times a day PRN Sore Throat  dextrose 40% Gel 15 Gram(s) Oral once PRN Blood Glucose LESS THAN 70 milliGRAM(s)/deciliter  glucagon  Injectable 1 milliGRAM(s) IntraMuscular once PRN Glucose LESS THAN 70 milligrams/deciliter      ALLERGIES:  Allergies    No Known Allergies    Intolerances        LABS:                        11.8   7.47  )-----------( 229      ( 06 Oct 2020 06:38 )             38.4     10-06    138  |  101  |  31<H>  ----------------------------<  222<H>  4.6   |  25  |  0.79    Ca    9.4      06 Oct 2020 06:38  Phos  4.5     10-06  Mg     2.2     10-06    TPro  6.8  /  Alb  3.3  /  TBili  0.4  /  DBili  x   /  AST  10  /  ALT  9<L>  /  AlkPhos  73  10-06        RADIOLOGY & ADDITIONAL TESTS: Reviewed. Transfer Note - [facility] MICU to [facility] COURSE: Patient is a 65 F with PMH BPD, DM, recurrent UTIs on nitrofurantoin, hypothyroidism, who presented from NH with reported Fever to 104, cough and chills, found to have Hypoxic Respiratory Failure 2/2 COVID PNA for which she was started on NRB, Antiviral, Decadron and Broad Spectrum ABx. Hospital course notable for New onset Bradycardia, different from admission for which cardiology and CCU were consulted, however both in agreement that bradyarrhythmia was not pathological. EPS also called to evaluate for sinus bradycardia with HR 30 -40 BPM. Per EPS, bradycardia could be secondary to hypoxia and underlying infection, and therefore recommended holding off on transvenous pacing at this time. Also recommended starting Dobutamine 2.5 with goal to slowly uptitrate to 5 and monitor HR response. Pt stepped up to [facility] MICU for closer monitoring where arterial line and huitron were placed. Pt otherwise remained asymptomatic and normotensive w/ HR ranging b/t 40-50 during the day and b/t 30-40 when asleep at night. On 10/5, dobutamine was discontinued and pt's arterial line was removed. Pt since determined to be hemodynamically stable for step down to telemetry unit for further HR surveillance.      SUBJECTIVE/OVERNIGHT EVENTS: No acute overnight events. Pt seen at bedside in AM, resting upright in bed, breathing comfortably on HFNC 40%/40L, and does not appear to be in acute respiratory distress.    VITAL SIGNS:  Vital Signs Last 24 Hrs  T(C): 36.2 (06 Oct 2020 09:00), Max: 37.2 (05 Oct 2020 18:23)  T(F): 97.2 (06 Oct 2020 09:00), Max: 99 (05 Oct 2020 18:23)  HR: 44 (06 Oct 2020 07:00) (43 - 76)  BP: 115/56 (06 Oct 2020 07:00) (105/58 - 136/63)  BP(mean): 85 (05 Oct 2020 22:00) (85 - 85)  RR: 21 (06 Oct 2020 08:00) (20 - 42)  SpO2: 87% (06 Oct 2020 07:00) (85% - 96%)    PHYSICAL EXAM:  General: NAD; speaking in full sentences  HEENT: NC/AT; PERRL; EOMI; MMM  Neck: supple; no JVD  Cardiac: +bradycardia; +S1/S2  Pulm: +breathing on high flow NC; otherwise CTA B/L; no W/R/R  GI: soft, NT/ND, +BS  Extremities: WWP; no edema, clubbing or cyanosis  Vasc: 2+ radial, DP pulses B/L  Neuro: AAOx3; no focal deficits    MEDICATIONS:  MEDICATIONS  (STANDING):  ARIPiprazole 10 milliGRAM(s) Oral daily  atorvastatin 20 milliGRAM(s) Oral at bedtime  cholecalciferol 1000 Unit(s) Oral daily  dexAMETHasone  Injectable 6 milliGRAM(s) IV Push every 24 hours  dextrose 5%. 1000 milliLiter(s) (50 mL/Hr) IV Continuous <Continuous>  dextrose 50% Injectable 12.5 Gram(s) IV Push once  dextrose 50% Injectable 25 Gram(s) IV Push once  dextrose 50% Injectable 25 Gram(s) IV Push once  diVALproex ER 1000 milliGRAM(s) Oral at bedtime  enoxaparin Injectable 40 milliGRAM(s) SubCutaneous every 12 hours  ferrous    sulfate 325 milliGRAM(s) Oral daily  influenza  Vaccine (HIGH DOSE) 0.7 milliLiter(s) IntraMuscular once  insulin glargine Injectable (LANTUS) 11 Unit(s) SubCutaneous at bedtime  insulin lispro (HumaLOG) corrective regimen sliding scale   SubCutaneous Before meals and at bedtime  insulin lispro Injectable (HumaLOG) 6 Unit(s) SubCutaneous three times a day before meals  liothyronine 25 MICROGram(s) Oral daily  pantoprazole    Tablet 40 milliGRAM(s) Oral before breakfast  PARoxetine 30 milliGRAM(s) Oral daily  polyethylene glycol 3350 17 Gram(s) Oral every 24 hours  QUEtiapine 400 milliGRAM(s) Oral at bedtime  remdesivir  IVPB 100 milliGRAM(s) IV Intermittent every 24 hours    MEDICATIONS  (PRN):  benzocaine 15 mG/menthol 3.6 mG (Sugar-Free) Lozenge 1 Lozenge Oral two times a day PRN Sore Throat  dextrose 40% Gel 15 Gram(s) Oral once PRN Blood Glucose LESS THAN 70 milliGRAM(s)/deciliter  glucagon  Injectable 1 milliGRAM(s) IntraMuscular once PRN Glucose LESS THAN 70 milligrams/deciliter      ALLERGIES:  Allergies    No Known Allergies    Intolerances        LABS:                        11.8   7.47  )-----------( 229      ( 06 Oct 2020 06:38 )             38.4     10-06    138  |  101  |  31<H>  ----------------------------<  222<H>  4.6   |  25  |  0.79    Ca    9.4      06 Oct 2020 06:38  Phos  4.5     10-06  Mg     2.2     10-06    TPro  6.8  /  Alb  3.3  /  TBili  0.4  /  DBili  x   /  AST  10  /  ALT  9<L>  /  AlkPhos  73  10-06        RADIOLOGY & ADDITIONAL TESTS: Reviewed.

## 2020-10-06 NOTE — PROGRESS NOTE ADULT - PROBLEM SELECTOR PLAN 1
Pt presented with dyspnea and was found to be hypoxemic requiring NRB supplemental oxygen. Since 10/1, she has been on NRB 10L with SpO2 92-94% and denies any worsening of her dyspnea/cough today 10/2. SpO2 noted to decrease to 85% on 10L NRB. Patient currently on HFNC 40/40  - f/u ABG   - continue to monitor respiratory status

## 2020-10-06 NOTE — PROGRESS NOTE ADULT - PROBLEM SELECTOR PLAN 3
Pt COVID postive x2,  presenting with fever of 101.4F in ED on 9/29  -contact/droplet isolation precautions  -c/w HF as tolerated   -c/w Remdesivir 200 mg x1, then 100 mg x 4 days (last day 10/8)  -c/w Decadron IV 6mg daily x 10days (first dose 9/30 12 AM)  -f/u daily COVID labs- CBC w/ diff, CMP, ferritin, CRP, D-dimer, Mg, Phos

## 2020-10-06 NOTE — PROGRESS NOTE ADULT - PROBLEM SELECTOR PLAN 5
Pt known to have hypothyroidism. Observed to be bradycardic on 10/1 with mild chest discomfort.  - c/w liothyronine 25mcg (home med)

## 2020-10-07 LAB
ALBUMIN SERPL ELPH-MCNC: 3.4 G/DL — SIGNIFICANT CHANGE UP (ref 3.3–5)
ALP SERPL-CCNC: 74 U/L — SIGNIFICANT CHANGE UP (ref 40–120)
ALT FLD-CCNC: 9 U/L — LOW (ref 10–45)
ANION GAP SERPL CALC-SCNC: 13 MMOL/L — SIGNIFICANT CHANGE UP (ref 5–17)
AST SERPL-CCNC: 11 U/L — SIGNIFICANT CHANGE UP (ref 10–40)
BASOPHILS # BLD AUTO: 0.02 K/UL — SIGNIFICANT CHANGE UP (ref 0–0.2)
BASOPHILS NFR BLD AUTO: 0.3 % — SIGNIFICANT CHANGE UP (ref 0–2)
BILIRUB SERPL-MCNC: 0.3 MG/DL — SIGNIFICANT CHANGE UP (ref 0.2–1.2)
BUN SERPL-MCNC: 30 MG/DL — HIGH (ref 7–23)
CALCIUM SERPL-MCNC: 9.2 MG/DL — SIGNIFICANT CHANGE UP (ref 8.4–10.5)
CHLORIDE SERPL-SCNC: 102 MMOL/L — SIGNIFICANT CHANGE UP (ref 96–108)
CO2 SERPL-SCNC: 25 MMOL/L — SIGNIFICANT CHANGE UP (ref 22–31)
CREAT SERPL-MCNC: 0.82 MG/DL — SIGNIFICANT CHANGE UP (ref 0.5–1.3)
CRP SERPL-MCNC: 1.15 MG/DL — HIGH (ref 0–0.4)
D DIMER BLD IA.RAPID-MCNC: 5852 NG/ML DDU — HIGH
EOSINOPHIL # BLD AUTO: 0 K/UL — SIGNIFICANT CHANGE UP (ref 0–0.5)
EOSINOPHIL NFR BLD AUTO: 0 % — SIGNIFICANT CHANGE UP (ref 0–6)
FERRITIN SERPL-MCNC: 132 NG/ML — SIGNIFICANT CHANGE UP (ref 15–150)
GLUCOSE BLDC GLUCOMTR-MCNC: 102 MG/DL — HIGH (ref 70–99)
GLUCOSE BLDC GLUCOMTR-MCNC: 120 MG/DL — HIGH (ref 70–99)
GLUCOSE BLDC GLUCOMTR-MCNC: 190 MG/DL — HIGH (ref 70–99)
GLUCOSE BLDC GLUCOMTR-MCNC: 211 MG/DL — HIGH (ref 70–99)
GLUCOSE BLDC GLUCOMTR-MCNC: 231 MG/DL — HIGH (ref 70–99)
GLUCOSE SERPL-MCNC: 208 MG/DL — HIGH (ref 70–99)
HCT VFR BLD CALC: 38.1 % — SIGNIFICANT CHANGE UP (ref 34.5–45)
HGB BLD-MCNC: 12 G/DL — SIGNIFICANT CHANGE UP (ref 11.5–15.5)
IMM GRANULOCYTES NFR BLD AUTO: 2.7 % — HIGH (ref 0–1.5)
LYMPHOCYTES # BLD AUTO: 0.93 K/UL — LOW (ref 1–3.3)
LYMPHOCYTES # BLD AUTO: 15 % — SIGNIFICANT CHANGE UP (ref 13–44)
MAGNESIUM SERPL-MCNC: 2.2 MG/DL — SIGNIFICANT CHANGE UP (ref 1.6–2.6)
MCHC RBC-ENTMCNC: 25.4 PG — LOW (ref 27–34)
MCHC RBC-ENTMCNC: 31.5 GM/DL — LOW (ref 32–36)
MCV RBC AUTO: 80.7 FL — SIGNIFICANT CHANGE UP (ref 80–100)
MONOCYTES # BLD AUTO: 0.11 K/UL — SIGNIFICANT CHANGE UP (ref 0–0.9)
MONOCYTES NFR BLD AUTO: 1.8 % — LOW (ref 2–14)
NEUTROPHILS # BLD AUTO: 4.98 K/UL — SIGNIFICANT CHANGE UP (ref 1.8–7.4)
NEUTROPHILS NFR BLD AUTO: 80.2 % — HIGH (ref 43–77)
NRBC # BLD: 0 /100 WBCS — SIGNIFICANT CHANGE UP (ref 0–0)
PHOSPHATE SERPL-MCNC: 4.8 MG/DL — HIGH (ref 2.5–4.5)
PLATELET # BLD AUTO: 224 K/UL — SIGNIFICANT CHANGE UP (ref 150–400)
POTASSIUM SERPL-MCNC: 4.8 MMOL/L — SIGNIFICANT CHANGE UP (ref 3.5–5.3)
POTASSIUM SERPL-SCNC: 4.8 MMOL/L — SIGNIFICANT CHANGE UP (ref 3.5–5.3)
PROT SERPL-MCNC: 6.9 G/DL — SIGNIFICANT CHANGE UP (ref 6–8.3)
RBC # BLD: 4.72 M/UL — SIGNIFICANT CHANGE UP (ref 3.8–5.2)
RBC # FLD: 15.7 % — HIGH (ref 10.3–14.5)
SODIUM SERPL-SCNC: 140 MMOL/L — SIGNIFICANT CHANGE UP (ref 135–145)
WBC # BLD: 6.21 K/UL — SIGNIFICANT CHANGE UP (ref 3.8–10.5)
WBC # FLD AUTO: 6.21 K/UL — SIGNIFICANT CHANGE UP (ref 3.8–10.5)

## 2020-10-07 PROCEDURE — 99291 CRITICAL CARE FIRST HOUR: CPT | Mod: CS

## 2020-10-07 PROCEDURE — 99232 SBSQ HOSP IP/OBS MODERATE 35: CPT | Mod: CS,GC

## 2020-10-07 RX ORDER — INSULIN LISPRO 100/ML
3 VIAL (ML) SUBCUTANEOUS
Refills: 0 | Status: DISCONTINUED | OUTPATIENT
Start: 2020-10-07 | End: 2020-10-11

## 2020-10-07 RX ORDER — INSULIN GLARGINE 100 [IU]/ML
8 INJECTION, SOLUTION SUBCUTANEOUS AT BEDTIME
Refills: 0 | Status: DISCONTINUED | OUTPATIENT
Start: 2020-10-07 | End: 2020-10-11

## 2020-10-07 RX ADMIN — Medication 6 UNIT(S): at 07:30

## 2020-10-07 RX ADMIN — POLYETHYLENE GLYCOL 3350 17 GRAM(S): 17 POWDER, FOR SOLUTION ORAL at 06:14

## 2020-10-07 RX ADMIN — REMDESIVIR 500 MILLIGRAM(S): 5 INJECTION INTRAVENOUS at 22:41

## 2020-10-07 RX ADMIN — ATORVASTATIN CALCIUM 20 MILLIGRAM(S): 80 TABLET, FILM COATED ORAL at 22:54

## 2020-10-07 RX ADMIN — Medication 0: at 22:39

## 2020-10-07 RX ADMIN — INSULIN GLARGINE 11 UNIT(S): 100 INJECTION, SOLUTION SUBCUTANEOUS at 01:11

## 2020-10-07 RX ADMIN — ENOXAPARIN SODIUM 40 MILLIGRAM(S): 100 INJECTION SUBCUTANEOUS at 17:44

## 2020-10-07 RX ADMIN — Medication 4: at 17:43

## 2020-10-07 RX ADMIN — Medication 3 UNIT(S): at 17:44

## 2020-10-07 RX ADMIN — INSULIN GLARGINE 8 UNIT(S): 100 INJECTION, SOLUTION SUBCUTANEOUS at 22:40

## 2020-10-07 RX ADMIN — PANTOPRAZOLE SODIUM 40 MILLIGRAM(S): 20 TABLET, DELAYED RELEASE ORAL at 06:14

## 2020-10-07 RX ADMIN — Medication 6 MILLIGRAM(S): at 22:43

## 2020-10-07 RX ADMIN — Medication 1000 UNIT(S): at 13:46

## 2020-10-07 RX ADMIN — DIVALPROEX SODIUM 1000 MILLIGRAM(S): 500 TABLET, DELAYED RELEASE ORAL at 22:50

## 2020-10-07 RX ADMIN — Medication 2: at 07:30

## 2020-10-07 RX ADMIN — ARIPIPRAZOLE 10 MILLIGRAM(S): 15 TABLET ORAL at 13:46

## 2020-10-07 RX ADMIN — ENOXAPARIN SODIUM 40 MILLIGRAM(S): 100 INJECTION SUBCUTANEOUS at 05:23

## 2020-10-07 RX ADMIN — Medication 4: at 11:51

## 2020-10-07 RX ADMIN — QUETIAPINE FUMARATE 400 MILLIGRAM(S): 200 TABLET, FILM COATED ORAL at 22:49

## 2020-10-07 RX ADMIN — Medication 30 MILLIGRAM(S): at 13:46

## 2020-10-07 RX ADMIN — LIOTHYRONINE SODIUM 25 MICROGRAM(S): 25 TABLET ORAL at 05:22

## 2020-10-07 RX ADMIN — Medication 3 UNIT(S): at 12:12

## 2020-10-07 RX ADMIN — Medication 325 MILLIGRAM(S): at 13:47

## 2020-10-07 NOTE — PHYSICAL THERAPY INITIAL EVALUATION ADULT - PERTINENT HX OF CURRENT PROBLEM, REHAB EVAL
Pt. is a 65 y.o female presenting with fevers, cough, chills; was found to be in acute hypoxic respiratory failure secondary COVID PNA. Hospital course was complicated by bradycardia.

## 2020-10-07 NOTE — PROGRESS NOTE ADULT - PROBLEM SELECTOR PLAN 3
Pt COVID postive x2,  presenting with fever of 101.4F in ED on 9/29  -contact/droplet isolation precautions  -c/w NC 3L as tolerated   -Discontinue Remdesivir at this time (received a total of 8 doses)  -c/w Decadron IV 6mg daily x 10days (first dose 9/30 12 AM)  -f/u daily COVID labs- CBC w/ diff, CMP, ferritin, CRP, D-dimer, Mg, Phos

## 2020-10-07 NOTE — PHYSICAL THERAPY INITIAL EVALUATION ADULT - ADDITIONAL COMMENTS
Pt. is currently admitted from short term [facility], however, reports being functionally independent prior to [facility], reports ambulating w/o assistive device and living alone in apt. w/o steps to enter.

## 2020-10-07 NOTE — PROGRESS NOTE ADULT - SUBJECTIVE AND OBJECTIVE BOX
INTERVAL EVENTS:  - tele: maintained sinus bradycardia with rates in 40s.      MEDICATIONS  (STANDING):  ARIPiprazole 10 milliGRAM(s) Oral daily  atorvastatin 20 milliGRAM(s) Oral at bedtime  cholecalciferol 1000 Unit(s) Oral daily  dexAMETHasone  Injectable 6 milliGRAM(s) IV Push every 24 hours  dextrose 5%. 1000 milliLiter(s) (50 mL/Hr) IV Continuous <Continuous>  dextrose 50% Injectable 12.5 Gram(s) IV Push once  dextrose 50% Injectable 25 Gram(s) IV Push once  dextrose 50% Injectable 25 Gram(s) IV Push once  diVALproex ER 1000 milliGRAM(s) Oral at bedtime  enoxaparin Injectable 40 milliGRAM(s) SubCutaneous every 12 hours  ferrous    sulfate 325 milliGRAM(s) Oral daily  influenza  Vaccine (HIGH DOSE) 0.7 milliLiter(s) IntraMuscular once  insulin glargine Injectable (LANTUS) 8 Unit(s) SubCutaneous at bedtime  insulin lispro (HumaLOG) corrective regimen sliding scale   SubCutaneous Before meals and at bedtime  insulin lispro Injectable (HumaLOG) 3 Unit(s) SubCutaneous three times a day before meals  liothyronine 25 MICROGram(s) Oral daily  pantoprazole    Tablet 40 milliGRAM(s) Oral before breakfast  PARoxetine 30 milliGRAM(s) Oral daily  polyethylene glycol 3350 17 Gram(s) Oral every 24 hours  QUEtiapine 400 milliGRAM(s) Oral at bedtime  remdesivir  IVPB 100 milliGRAM(s) IV Intermittent every 24 hours    MEDICATIONS  (PRN):  benzocaine 15 mG/menthol 3.6 mG (Sugar-Free) Lozenge 1 Lozenge Oral two times a day PRN Sore Throat  dextrose 40% Gel 15 Gram(s) Oral once PRN Blood Glucose LESS THAN 70 milliGRAM(s)/deciliter  glucagon  Injectable 1 milliGRAM(s) IntraMuscular once PRN Glucose LESS THAN 70 milligrams/deciliter      Home Medications:  ARIPiprazole 10 mg oral tablet: 1 tab(s) orally once a day (29 Sep 2020 22:33)  atorvastatin 20 mg oral tablet: 1 tab(s) orally once a day (29 Sep 2020 22:33)  cholecalciferol 1000 intl units (25 mcg) oral capsule: 1 cap(s) orally once a day (29 Sep 2020 22:33)  divalproex sodium 500 mg oral delayed release tablet: 2 tab(s) orally once a day (at bedtime) (29 Sep 2020 22:33)  ferrous sulfate 325 mg (65 mg elemental iron) oral tablet: 1 tab(s) orally once a day (29 Sep 2020 22:33)  Januvia 100 mg oral tablet: 1 tab(s) orally once a day (29 Sep 2020 22:33)  liothyronine 25 mcg oral tablet: 1 tab(s) orally once a day (29 Sep 2020 22:33)  metFORMIN 1000 mg oral tablet: 1 tab(s) orally 2 times a day (29 Sep 2020 22:33)  nitrofurantoin macrocrystals 50 mg oral capsule: 1 cap(s) orally once a day (29 Sep 2020 22:33)  oxybutynin 10 mg/24 hr oral tablet, extended release: 1 tab(s) orally once a day (29 Sep 2020 22:33)  PARoxetine 30 mg oral tablet: 1 tab(s) orally once a day (29 Sep 2020 22:33)  polyethylene glycol 3350 oral powder for reconstitution: 17 gram(s) orally every 24 hours (29 Sep 2020 22:33)  QUEtiapine 200 mg oral tablet: 2 tab(s) orally once a day (at bedtime) (29 Sep 2020 22:33)  Vascepa 1 g oral capsule: 2 cap(s) orally 2 times a day (29 Sep 2020 22:33)      Vital Signs Last 24 Hrs  T(C): 36.2 (07 Oct 2020 18:03), Max: 36.3 (07 Oct 2020 17:40)  T(F): 97.1 (07 Oct 2020 18:03), Max: 97.4 (07 Oct 2020 17:40)  HR: 47 (07 Oct 2020 17:00) (44 - 51)  BP: 132/73 (07 Oct 2020 17:00) (100/54 - 157/69)  BP(mean): 90 (07 Oct 2020 13:00) (90 - 90)  RR: 22 (07 Oct 2020 17:00) (18 - 26)  SpO2: 94% (07 Oct 2020 17:00) (90% - 94%)       LABS:                        12.0   6.21  )-----------( 224      ( 07 Oct 2020 07:34 )             38.1     10-07    140  |  102  |  30<H>  ----------------------------<  208<H>  4.8   |  25  |  0.82    Ca    9.2      07 Oct 2020 07:34  Phos  4.8     10-07  Mg     2.2     10-07    TPro  6.9  /  Alb  3.4  /  TBili  0.3  /  DBili  x   /  AST  11  /  ALT  9<L>  /  AlkPhos  74  10-07            I&O's Summary    06 Oct 2020 07:01  -  07 Oct 2020 07:00  --------------------------------------------------------  IN: 250 mL / OUT: 250 mL / NET: 0 mL    65F PMH BPD, DM, recurrent UTIs on nitrofurantoin, hypothyroidism, p/w cough/fever in setting of COVID. Cardiology consulted for bradycardia.    #Bradycardia: Asymptomatic. Weaned off  gtt.  - Atropine at bedside for symptomatic worsening bradycardia. If unresponsive, could start  gtt although no need at this point. Pacing for unstable bradycardia.    Cardiology will sign off. Please call back with questions as needed.  D/w Dr. Octaviano Cedeño MD PGY4

## 2020-10-07 NOTE — PHYSICAL THERAPY INITIAL EVALUATION ADULT - MODALITIES TREATMENT COMMENTS
Pt. was noted to desaturate to 89% with short distance ambulation, with subsequent recovery to 93% on NC=5L/min with seated rest within 2 min.

## 2020-10-07 NOTE — PROGRESS NOTE ADULT - PROBLEM SELECTOR PLAN 1
Pt presented with dyspnea and was found to be hypoxemic requiring NRB supplemental oxygen. Since 10/1, she has been on NRB 10L with SpO2 92-94% and denies any worsening of her dyspnea/cough today 10/2. SpO2 noted to decrease to 85% on 10L NRB. Patient weaned off of HFNC to NC 6L today 10/6.  - c/w NC 3L and wean down O2 as tolerated  - continue to monitor respiratory status  - PT consult today, f/u recommendations for dispo back to nursing home

## 2020-10-07 NOTE — PROGRESS NOTE ADULT - PROBLEM SELECTOR PLAN 4
HgbA1c of 6.9  - Hold home medications, Januvia and Metformin  - mISS  - Adjust Lantus dose to 8U + 3U Humalog pre-meal  - monitor FSG

## 2020-10-07 NOTE — PROGRESS NOTE ADULT - SUBJECTIVE AND OBJECTIVE BOX
OVERNIGHT EVENTS: Hypoglycemic to FSG 70, given juice, adjusted to . Lantus delayed until FSG corrected (~1h).    SUBJECTIVE / INTERVAL HPI: Patient seen and examined at bedside. Pt feels subjectively well with regard to her dyspnea, denies fever, chest pain, abdominal pain, n/v, d/c.    O2 Requirements: NC 3L with SpO2 92-95%    VITAL SIGNS:  Vital Signs Last 24 Hrs  T(C): 36.1 (07 Oct 2020 13:19), Max: 36.1 (06 Oct 2020 18:00)  T(F): 97 (07 Oct 2020 13:19), Max: 97 (06 Oct 2020 18:00)  HR: 50 (07 Oct 2020 13:00) (44 - 51)  BP: 157/69 (07 Oct 2020 13:00) (100/54 - 167/73)  BP(mean): 90 (07 Oct 2020 13:00) (90 - 90)  RR: 25 (07 Oct 2020 13:00) (20 - 26)  SpO2: 92% (07 Oct 2020 13:00) (90% - 93%)    PHYSICAL EXAM:    General: WDWN  HEENT: NC/AT; PERRL, anicteric sclera; MMM  Neck: supple  Cardiovascular: +S1/S2, RRR  Respiratory: CTA B/L; no W/R/R  Gastrointestinal: soft, NT/ND; +BSx4  Extremities: WWP; no edema, clubbing or cyanosis  Vascular: 2+ radial, DP/PT pulses B/L  Neurological: AAOx3; no focal deficits    MEDICATIONS:  MEDICATIONS  (STANDING):  ARIPiprazole 10 milliGRAM(s) Oral daily  atorvastatin 20 milliGRAM(s) Oral at bedtime  cholecalciferol 1000 Unit(s) Oral daily  dexAMETHasone  Injectable 6 milliGRAM(s) IV Push every 24 hours  dextrose 5%. 1000 milliLiter(s) (50 mL/Hr) IV Continuous <Continuous>  dextrose 50% Injectable 12.5 Gram(s) IV Push once  dextrose 50% Injectable 25 Gram(s) IV Push once  dextrose 50% Injectable 25 Gram(s) IV Push once  diVALproex ER 1000 milliGRAM(s) Oral at bedtime  enoxaparin Injectable 40 milliGRAM(s) SubCutaneous every 12 hours  ferrous    sulfate 325 milliGRAM(s) Oral daily  influenza  Vaccine (HIGH DOSE) 0.7 milliLiter(s) IntraMuscular once  insulin glargine Injectable (LANTUS) 8 Unit(s) SubCutaneous at bedtime  insulin lispro (HumaLOG) corrective regimen sliding scale   SubCutaneous Before meals and at bedtime  insulin lispro Injectable (HumaLOG) 3 Unit(s) SubCutaneous three times a day before meals  liothyronine 25 MICROGram(s) Oral daily  pantoprazole    Tablet 40 milliGRAM(s) Oral before breakfast  PARoxetine 30 milliGRAM(s) Oral daily  polyethylene glycol 3350 17 Gram(s) Oral every 24 hours  QUEtiapine 400 milliGRAM(s) Oral at bedtime  remdesivir  IVPB 100 milliGRAM(s) IV Intermittent every 24 hours    MEDICATIONS  (PRN):  benzocaine 15 mG/menthol 3.6 mG (Sugar-Free) Lozenge 1 Lozenge Oral two times a day PRN Sore Throat  dextrose 40% Gel 15 Gram(s) Oral once PRN Blood Glucose LESS THAN 70 milliGRAM(s)/deciliter  glucagon  Injectable 1 milliGRAM(s) IntraMuscular once PRN Glucose LESS THAN 70 milligrams/deciliter      ALLERGIES:  Allergies    No Known Allergies    Intolerances        LABS:                        12.0   6.21  )-----------( 224      ( 07 Oct 2020 07:34 )             38.1     10-07    140  |  102  |  30<H>  ----------------------------<  208<H>  4.8   |  25  |  0.82    Ca    9.2      07 Oct 2020 07:34  Phos  4.8     10-07  Mg     2.2     10-07    TPro  6.9  /  Alb  3.4  /  TBili  0.3  /  DBili  x   /  AST  11  /  ALT  9<L>  /  AlkPhos  74  10-07        CAPILLARY BLOOD GLUCOSE      POCT Blood Glucose.: 231 mg/dL (07 Oct 2020 11:23)      RADIOLOGY & ADDITIONAL TESTS: Reviewed.

## 2020-10-08 LAB
ACANTHOCYTES BLD QL SMEAR: SIGNIFICANT CHANGE UP
ALBUMIN SERPL ELPH-MCNC: 3.1 G/DL — LOW (ref 3.3–5)
ALP SERPL-CCNC: 72 U/L — SIGNIFICANT CHANGE UP (ref 40–120)
ALT FLD-CCNC: 10 U/L — SIGNIFICANT CHANGE UP (ref 10–45)
ANION GAP SERPL CALC-SCNC: 10 MMOL/L — SIGNIFICANT CHANGE UP (ref 5–17)
ANISOCYTOSIS BLD QL: SLIGHT — SIGNIFICANT CHANGE UP
AST SERPL-CCNC: 9 U/L — LOW (ref 10–40)
BASOPHILS # BLD AUTO: 0.06 K/UL — SIGNIFICANT CHANGE UP (ref 0–0.2)
BASOPHILS NFR BLD AUTO: 0.9 % — SIGNIFICANT CHANGE UP (ref 0–2)
BILIRUB SERPL-MCNC: 0.3 MG/DL — SIGNIFICANT CHANGE UP (ref 0.2–1.2)
BUN SERPL-MCNC: 34 MG/DL — HIGH (ref 7–23)
CALCIUM SERPL-MCNC: 9.4 MG/DL — SIGNIFICANT CHANGE UP (ref 8.4–10.5)
CHLORIDE SERPL-SCNC: 100 MMOL/L — SIGNIFICANT CHANGE UP (ref 96–108)
CO2 SERPL-SCNC: 26 MMOL/L — SIGNIFICANT CHANGE UP (ref 22–31)
CREAT SERPL-MCNC: 0.84 MG/DL — SIGNIFICANT CHANGE UP (ref 0.5–1.3)
DACRYOCYTES BLD QL SMEAR: SLIGHT — SIGNIFICANT CHANGE UP
EOSINOPHIL # BLD AUTO: 0 K/UL — SIGNIFICANT CHANGE UP (ref 0–0.5)
EOSINOPHIL NFR BLD AUTO: 0 % — SIGNIFICANT CHANGE UP (ref 0–6)
GIANT PLATELETS BLD QL SMEAR: PRESENT — SIGNIFICANT CHANGE UP
GLUCOSE BLDC GLUCOMTR-MCNC: 116 MG/DL — HIGH (ref 70–99)
GLUCOSE BLDC GLUCOMTR-MCNC: 130 MG/DL — HIGH (ref 70–99)
GLUCOSE BLDC GLUCOMTR-MCNC: 170 MG/DL — HIGH (ref 70–99)
GLUCOSE BLDC GLUCOMTR-MCNC: 329 MG/DL — HIGH (ref 70–99)
GLUCOSE SERPL-MCNC: 145 MG/DL — HIGH (ref 70–99)
HCT VFR BLD CALC: 39.2 % — SIGNIFICANT CHANGE UP (ref 34.5–45)
HGB BLD-MCNC: 12.2 G/DL — SIGNIFICANT CHANGE UP (ref 11.5–15.5)
LYMPHOCYTES # BLD AUTO: 1.5 K/UL — SIGNIFICANT CHANGE UP (ref 1–3.3)
LYMPHOCYTES # BLD AUTO: 20.9 % — SIGNIFICANT CHANGE UP (ref 13–44)
MANUAL SMEAR VERIFICATION: SIGNIFICANT CHANGE UP
MCHC RBC-ENTMCNC: 24.9 PG — LOW (ref 27–34)
MCHC RBC-ENTMCNC: 31.1 GM/DL — LOW (ref 32–36)
MCV RBC AUTO: 80 FL — SIGNIFICANT CHANGE UP (ref 80–100)
MONOCYTES # BLD AUTO: 0.5 K/UL — SIGNIFICANT CHANGE UP (ref 0–0.9)
MONOCYTES NFR BLD AUTO: 6.9 % — SIGNIFICANT CHANGE UP (ref 2–14)
MYELOCYTES NFR BLD: 2.6 % — HIGH (ref 0–0)
NEUTROPHILS # BLD AUTO: 4.75 K/UL — SIGNIFICANT CHANGE UP (ref 1.8–7.4)
NEUTROPHILS NFR BLD AUTO: 66.1 % — SIGNIFICANT CHANGE UP (ref 43–77)
OVALOCYTES BLD QL SMEAR: SLIGHT — SIGNIFICANT CHANGE UP
PLAT MORPH BLD: ABNORMAL
PLATELET # BLD AUTO: 241 K/UL — SIGNIFICANT CHANGE UP (ref 150–400)
POIKILOCYTOSIS BLD QL AUTO: SIGNIFICANT CHANGE UP
POTASSIUM SERPL-MCNC: 4.2 MMOL/L — SIGNIFICANT CHANGE UP (ref 3.5–5.3)
POTASSIUM SERPL-SCNC: 4.2 MMOL/L — SIGNIFICANT CHANGE UP (ref 3.5–5.3)
PROT SERPL-MCNC: 6.6 G/DL — SIGNIFICANT CHANGE UP (ref 6–8.3)
RBC # BLD: 4.9 M/UL — SIGNIFICANT CHANGE UP (ref 3.8–5.2)
RBC # FLD: 15.8 % — HIGH (ref 10.3–14.5)
RBC BLD AUTO: ABNORMAL
SARS-COV-2 RNA SPEC QL NAA+PROBE: DETECTED
SCHISTOCYTES BLD QL AUTO: SLIGHT — SIGNIFICANT CHANGE UP
SODIUM SERPL-SCNC: 136 MMOL/L — SIGNIFICANT CHANGE UP (ref 135–145)
SPHEROCYTES BLD QL SMEAR: SLIGHT — SIGNIFICANT CHANGE UP
TARGETS BLD QL SMEAR: SLIGHT — SIGNIFICANT CHANGE UP
VARIANT LYMPHS # BLD: 2.6 % — SIGNIFICANT CHANGE UP (ref 0–6)
WBC # BLD: 7.19 K/UL — SIGNIFICANT CHANGE UP (ref 3.8–10.5)
WBC # FLD AUTO: 7.19 K/UL — SIGNIFICANT CHANGE UP (ref 3.8–10.5)

## 2020-10-08 PROCEDURE — 99232 SBSQ HOSP IP/OBS MODERATE 35: CPT | Mod: CS,GC

## 2020-10-08 RX ADMIN — ENOXAPARIN SODIUM 40 MILLIGRAM(S): 100 INJECTION SUBCUTANEOUS at 17:08

## 2020-10-08 RX ADMIN — Medication 6 MILLIGRAM(S): at 21:18

## 2020-10-08 RX ADMIN — Medication 3 UNIT(S): at 11:37

## 2020-10-08 RX ADMIN — POLYETHYLENE GLYCOL 3350 17 GRAM(S): 17 POWDER, FOR SOLUTION ORAL at 06:39

## 2020-10-08 RX ADMIN — REMDESIVIR 500 MILLIGRAM(S): 5 INJECTION INTRAVENOUS at 21:19

## 2020-10-08 RX ADMIN — QUETIAPINE FUMARATE 400 MILLIGRAM(S): 200 TABLET, FILM COATED ORAL at 22:15

## 2020-10-08 RX ADMIN — INSULIN GLARGINE 8 UNIT(S): 100 INJECTION, SOLUTION SUBCUTANEOUS at 22:22

## 2020-10-08 RX ADMIN — Medication 1000 UNIT(S): at 12:02

## 2020-10-08 RX ADMIN — ENOXAPARIN SODIUM 40 MILLIGRAM(S): 100 INJECTION SUBCUTANEOUS at 06:39

## 2020-10-08 RX ADMIN — Medication 20 MILLIGRAM(S): at 12:02

## 2020-10-08 RX ADMIN — ARIPIPRAZOLE 10 MILLIGRAM(S): 15 TABLET ORAL at 12:02

## 2020-10-08 RX ADMIN — ATORVASTATIN CALCIUM 20 MILLIGRAM(S): 80 TABLET, FILM COATED ORAL at 21:18

## 2020-10-08 RX ADMIN — Medication 3 UNIT(S): at 06:53

## 2020-10-08 RX ADMIN — DIVALPROEX SODIUM 1000 MILLIGRAM(S): 500 TABLET, DELAYED RELEASE ORAL at 22:15

## 2020-10-08 RX ADMIN — LIOTHYRONINE SODIUM 25 MICROGRAM(S): 25 TABLET ORAL at 06:39

## 2020-10-08 RX ADMIN — Medication 325 MILLIGRAM(S): at 12:02

## 2020-10-08 RX ADMIN — Medication 2: at 06:52

## 2020-10-08 RX ADMIN — PANTOPRAZOLE SODIUM 40 MILLIGRAM(S): 20 TABLET, DELAYED RELEASE ORAL at 06:39

## 2020-10-08 RX ADMIN — Medication 8: at 11:37

## 2020-10-08 RX ADMIN — Medication 3 UNIT(S): at 17:08

## 2020-10-08 NOTE — PROGRESS NOTE ADULT - PROBLEM SELECTOR PLAN 1
Pt presented with dyspnea and was found to be hypoxemic requiring NRB supplemental oxygen. Since 10/1, she has been on NRB 10L with SpO2 92-94% and denies any worsening of her dyspnea/cough today 10/2. SpO2 noted to decrease to 85% on 10L NRB. Patient weaned off of HFNC to NC 6L 10/6.  - c/w NC 5L and wean down O2 as tolerated  - continue to monitor respiratory status  - PT recommends discharge to [facility]/SNF  - Pending COVID swabs x2 (today and tomorrow 10/9) for placement

## 2020-10-08 NOTE — PROGRESS NOTE ADULT - SUBJECTIVE AND OBJECTIVE BOX
OVERNIGHT EVENTS: CODEY    SUBJECTIVE / INTERVAL HPI: Patient seen and examined at bedside. Admits to cough and dyspnea but denies fever, chest pain, abdominal pain, n/v, d/c.    O2 Requirements: NC 5L with SpO2 92-98%    VITAL SIGNS:  Vital Signs Last 24 Hrs  T(C): 36.1 (08 Oct 2020 14:27), Max: 36.3 (07 Oct 2020 17:40)  T(F): 97 (08 Oct 2020 14:27), Max: 97.4 (07 Oct 2020 17:40)  HR: 49 (08 Oct 2020 11:42) (42 - 967)  BP: 112/54 (08 Oct 2020 11:42) (96/47 - 153/57)  BP(mean): 68 (08 Oct 2020 11:42) (59 - 85)  RR: 20 (08 Oct 2020 11:42) (18 - 22)  SpO2: 96% (08 Oct 2020 11:42) (92% - 98%)    PHYSICAL EXAM:    General: WDWN  HEENT: NC/AT; PERRL, anicteric sclera; MMM  Neck: supple  Cardiovascular: +S1/S2, RRR  Respiratory: CTA B/L; no W/R/R  Gastrointestinal: soft, NT/ND; +BSx4  Extremities: WWP; no edema, clubbing or cyanosis  Vascular: 2+ radial, DP/PT pulses B/L  Neurological: AAOx3; no focal deficits    MEDICATIONS:  MEDICATIONS  (STANDING):  ARIPiprazole 10 milliGRAM(s) Oral daily  atorvastatin 20 milliGRAM(s) Oral at bedtime  cholecalciferol 1000 Unit(s) Oral daily  dexAMETHasone  Injectable 6 milliGRAM(s) IV Push every 24 hours  dextrose 5%. 1000 milliLiter(s) (50 mL/Hr) IV Continuous <Continuous>  dextrose 50% Injectable 12.5 Gram(s) IV Push once  dextrose 50% Injectable 25 Gram(s) IV Push once  dextrose 50% Injectable 25 Gram(s) IV Push once  diVALproex ER 1000 milliGRAM(s) Oral at bedtime  enoxaparin Injectable 40 milliGRAM(s) SubCutaneous every 12 hours  ferrous    sulfate 325 milliGRAM(s) Oral daily  influenza  Vaccine (HIGH DOSE) 0.7 milliLiter(s) IntraMuscular once  insulin glargine Injectable (LANTUS) 8 Unit(s) SubCutaneous at bedtime  insulin lispro (HumaLOG) corrective regimen sliding scale   SubCutaneous Before meals and at bedtime  insulin lispro Injectable (HumaLOG) 3 Unit(s) SubCutaneous three times a day before meals  liothyronine 25 MICROGram(s) Oral daily  pantoprazole    Tablet 40 milliGRAM(s) Oral before breakfast  PARoxetine 30 milliGRAM(s) Oral daily  polyethylene glycol 3350 17 Gram(s) Oral every 24 hours  QUEtiapine 400 milliGRAM(s) Oral at bedtime  remdesivir  IVPB 100 milliGRAM(s) IV Intermittent every 24 hours    MEDICATIONS  (PRN):  benzocaine 15 mG/menthol 3.6 mG (Sugar-Free) Lozenge 1 Lozenge Oral two times a day PRN Sore Throat  dextrose 40% Gel 15 Gram(s) Oral once PRN Blood Glucose LESS THAN 70 milliGRAM(s)/deciliter  glucagon  Injectable 1 milliGRAM(s) IntraMuscular once PRN Glucose LESS THAN 70 milligrams/deciliter      ALLERGIES:  Allergies    No Known Allergies    Intolerances        LABS:                        12.0   6.21  )-----------( 224      ( 07 Oct 2020 07:34 )             38.1     10-07    140  |  102  |  30<H>  ----------------------------<  208<H>  4.8   |  25  |  0.82    Ca    9.2      07 Oct 2020 07:34  Phos  4.8     10-07  Mg     2.2     10-07    TPro  6.9  /  Alb  3.4  /  TBili  0.3  /  DBili  x   /  AST  11  /  ALT  9<L>  /  AlkPhos  74  10-07        CAPILLARY BLOOD GLUCOSE      POCT Blood Glucose.: 329 mg/dL (08 Oct 2020 11:26)      RADIOLOGY & ADDITIONAL TESTS: Reviewed.

## 2020-10-08 NOTE — PROGRESS NOTE ADULT - PROBLEM SELECTOR PLAN 3
Pt COVID postive x2,  presenting with fever of 101.4F in ED on 9/29  -contact/droplet isolation precautions  -c/w NC 5L as tolerated   -Discontinue Remdesivir at this time (received a total of 8 doses)  -c/w Decadron IV 6mg daily x 10days (first dose 9/30 12 AM)  -f/u daily COVID labs- CBC w/ diff, CMP, ferritin, CRP, D-dimer, Mg, Phos

## 2020-10-09 ENCOUNTER — TRANSCRIPTION ENCOUNTER (OUTPATIENT)
Age: 65
End: 2020-10-09

## 2020-10-09 LAB
ALBUMIN SERPL ELPH-MCNC: 2.9 G/DL — LOW (ref 3.3–5)
ALP SERPL-CCNC: 71 U/L — SIGNIFICANT CHANGE UP (ref 40–120)
ALT FLD-CCNC: 10 U/L — SIGNIFICANT CHANGE UP (ref 10–45)
ANION GAP SERPL CALC-SCNC: 12 MMOL/L — SIGNIFICANT CHANGE UP (ref 5–17)
AST SERPL-CCNC: 9 U/L — LOW (ref 10–40)
BASOPHILS # BLD AUTO: 0.01 K/UL — SIGNIFICANT CHANGE UP (ref 0–0.2)
BASOPHILS NFR BLD AUTO: 0.1 % — SIGNIFICANT CHANGE UP (ref 0–2)
BILIRUB SERPL-MCNC: 0.3 MG/DL — SIGNIFICANT CHANGE UP (ref 0.2–1.2)
BUN SERPL-MCNC: 32 MG/DL — HIGH (ref 7–23)
CALCIUM SERPL-MCNC: 9.7 MG/DL — SIGNIFICANT CHANGE UP (ref 8.4–10.5)
CHLORIDE SERPL-SCNC: 100 MMOL/L — SIGNIFICANT CHANGE UP (ref 96–108)
CO2 SERPL-SCNC: 24 MMOL/L — SIGNIFICANT CHANGE UP (ref 22–31)
CREAT SERPL-MCNC: 0.85 MG/DL — SIGNIFICANT CHANGE UP (ref 0.5–1.3)
CRP SERPL-MCNC: 0.52 MG/DL — HIGH (ref 0–0.4)
D DIMER BLD IA.RAPID-MCNC: 3566 NG/ML DDU — HIGH
EOSINOPHIL # BLD AUTO: 0.01 K/UL — SIGNIFICANT CHANGE UP (ref 0–0.5)
EOSINOPHIL NFR BLD AUTO: 0.1 % — SIGNIFICANT CHANGE UP (ref 0–6)
FERRITIN SERPL-MCNC: 119 NG/ML — SIGNIFICANT CHANGE UP (ref 15–150)
GLUCOSE BLDC GLUCOMTR-MCNC: 132 MG/DL — HIGH (ref 70–99)
GLUCOSE BLDC GLUCOMTR-MCNC: 159 MG/DL — HIGH (ref 70–99)
GLUCOSE BLDC GLUCOMTR-MCNC: 160 MG/DL — HIGH (ref 70–99)
GLUCOSE BLDC GLUCOMTR-MCNC: 293 MG/DL — HIGH (ref 70–99)
GLUCOSE SERPL-MCNC: 171 MG/DL — HIGH (ref 70–99)
HCT VFR BLD CALC: 40.9 % — SIGNIFICANT CHANGE UP (ref 34.5–45)
HGB BLD-MCNC: 12.6 G/DL — SIGNIFICANT CHANGE UP (ref 11.5–15.5)
IMM GRANULOCYTES NFR BLD AUTO: 2.6 % — HIGH (ref 0–1.5)
LYMPHOCYTES # BLD AUTO: 1.84 K/UL — SIGNIFICANT CHANGE UP (ref 1–3.3)
LYMPHOCYTES # BLD AUTO: 24.9 % — SIGNIFICANT CHANGE UP (ref 13–44)
MCHC RBC-ENTMCNC: 24.8 PG — LOW (ref 27–34)
MCHC RBC-ENTMCNC: 30.8 GM/DL — LOW (ref 32–36)
MCV RBC AUTO: 80.4 FL — SIGNIFICANT CHANGE UP (ref 80–100)
MONOCYTES # BLD AUTO: 0.51 K/UL — SIGNIFICANT CHANGE UP (ref 0–0.9)
MONOCYTES NFR BLD AUTO: 6.9 % — SIGNIFICANT CHANGE UP (ref 2–14)
NEUTROPHILS # BLD AUTO: 4.84 K/UL — SIGNIFICANT CHANGE UP (ref 1.8–7.4)
NEUTROPHILS NFR BLD AUTO: 65.4 % — SIGNIFICANT CHANGE UP (ref 43–77)
NRBC # BLD: 0 /100 WBCS — SIGNIFICANT CHANGE UP (ref 0–0)
PLATELET # BLD AUTO: 200 K/UL — SIGNIFICANT CHANGE UP (ref 150–400)
POTASSIUM SERPL-MCNC: 4.1 MMOL/L — SIGNIFICANT CHANGE UP (ref 3.5–5.3)
POTASSIUM SERPL-SCNC: 4.1 MMOL/L — SIGNIFICANT CHANGE UP (ref 3.5–5.3)
PROT SERPL-MCNC: 6.7 G/DL — SIGNIFICANT CHANGE UP (ref 6–8.3)
RBC # BLD: 5.09 M/UL — SIGNIFICANT CHANGE UP (ref 3.8–5.2)
RBC # FLD: 15.9 % — HIGH (ref 10.3–14.5)
SARS-COV-2 RNA SPEC QL NAA+PROBE: DETECTED
SODIUM SERPL-SCNC: 136 MMOL/L — SIGNIFICANT CHANGE UP (ref 135–145)
WBC # BLD: 7.4 K/UL — SIGNIFICANT CHANGE UP (ref 3.8–10.5)
WBC # FLD AUTO: 7.4 K/UL — SIGNIFICANT CHANGE UP (ref 3.8–10.5)

## 2020-10-09 PROCEDURE — 99232 SBSQ HOSP IP/OBS MODERATE 35: CPT | Mod: CS,GC

## 2020-10-09 RX ADMIN — INSULIN GLARGINE 8 UNIT(S): 100 INJECTION, SOLUTION SUBCUTANEOUS at 22:34

## 2020-10-09 RX ADMIN — Medication 2: at 07:46

## 2020-10-09 RX ADMIN — Medication 1000 UNIT(S): at 11:48

## 2020-10-09 RX ADMIN — ENOXAPARIN SODIUM 40 MILLIGRAM(S): 100 INJECTION SUBCUTANEOUS at 06:00

## 2020-10-09 RX ADMIN — ATORVASTATIN CALCIUM 20 MILLIGRAM(S): 80 TABLET, FILM COATED ORAL at 22:31

## 2020-10-09 RX ADMIN — Medication 30 MILLIGRAM(S): at 11:49

## 2020-10-09 RX ADMIN — ARIPIPRAZOLE 10 MILLIGRAM(S): 15 TABLET ORAL at 11:49

## 2020-10-09 RX ADMIN — PANTOPRAZOLE SODIUM 40 MILLIGRAM(S): 20 TABLET, DELAYED RELEASE ORAL at 06:00

## 2020-10-09 RX ADMIN — Medication 3 UNIT(S): at 17:19

## 2020-10-09 RX ADMIN — Medication 3 UNIT(S): at 11:49

## 2020-10-09 RX ADMIN — POLYETHYLENE GLYCOL 3350 17 GRAM(S): 17 POWDER, FOR SOLUTION ORAL at 06:00

## 2020-10-09 RX ADMIN — Medication 2: at 17:18

## 2020-10-09 RX ADMIN — QUETIAPINE FUMARATE 400 MILLIGRAM(S): 200 TABLET, FILM COATED ORAL at 22:31

## 2020-10-09 RX ADMIN — Medication 325 MILLIGRAM(S): at 11:49

## 2020-10-09 RX ADMIN — ENOXAPARIN SODIUM 40 MILLIGRAM(S): 100 INJECTION SUBCUTANEOUS at 17:18

## 2020-10-09 RX ADMIN — LIOTHYRONINE SODIUM 25 MICROGRAM(S): 25 TABLET ORAL at 06:00

## 2020-10-09 RX ADMIN — DIVALPROEX SODIUM 1000 MILLIGRAM(S): 500 TABLET, DELAYED RELEASE ORAL at 22:31

## 2020-10-09 RX ADMIN — Medication 3 UNIT(S): at 07:46

## 2020-10-09 RX ADMIN — Medication 6: at 11:49

## 2020-10-09 NOTE — DISCHARGE NOTE PROVIDER - CARE PROVIDER_API CALL
Lori Bello)  Critical Care Medicine; Pulmonary Disease  [address] [place] [address]  Phone: [phone]  Fax: [phone]  Follow Up Time: 2 weeks

## 2020-10-09 NOTE — PROGRESS NOTE ADULT - SUBJECTIVE AND OBJECTIVE BOX
OVERNIGHT EVENTS: Pt was placed on HFNC 50/70 overnight for 1 hour due to SpO2 85% while sleeping supine. Pt refused proning. Weaned back to NC 6L this AM at 7am.    SUBJECTIVE / INTERVAL HPI: Patient seen and examined at bedside. Feels subjectively well. Denies leg pain, chest pain, dyspnea, cough, fever, abdominal pain, n/v/, d/c.    VITAL SIGNS:  Vital Signs Last 24 Hrs  T(C): 35.8 (09 Oct 2020 04:15), Max: 36.1 (08 Oct 2020 14:27)  T(F): 96.4 (09 Oct 2020 04:15), Max: 97 (08 Oct 2020 14:27)  HR: 51 (09 Oct 2020 12:02) (43 - 51)  BP: 129/58 (09 Oct 2020 12:02) (108/56 - 160/73)  BP(mean): 102 (08 Oct 2020 20:30) (102 - 102)  RR: 20 (09 Oct 2020 12:02) (15 - 20)  SpO2: 96% (09 Oct 2020 12:02) (91% - 96%)    PHYSICAL EXAM:    General: WDWN  HEENT: NC/AT; PERRL, anicteric sclera; MMM  Neck: supple  Cardiovascular: +S1/S2, RRR  Respiratory: CTA B/L; no W/R/R  Gastrointestinal: soft, NT/ND; +BSx4  Extremities: WWP; no edema, clubbing or cyanosis, no warmth/erythema or swelling.   Vascular: 2+ radial, DP/PT pulses B/L  Neurological: AAOx3; no focal deficits    MEDICATIONS:  MEDICATIONS  (STANDING):  ARIPiprazole 10 milliGRAM(s) Oral daily  atorvastatin 20 milliGRAM(s) Oral at bedtime  cholecalciferol 1000 Unit(s) Oral daily  dextrose 5%. 1000 milliLiter(s) (50 mL/Hr) IV Continuous <Continuous>  dextrose 50% Injectable 12.5 Gram(s) IV Push once  dextrose 50% Injectable 25 Gram(s) IV Push once  dextrose 50% Injectable 25 Gram(s) IV Push once  diVALproex ER 1000 milliGRAM(s) Oral at bedtime  enoxaparin Injectable 40 milliGRAM(s) SubCutaneous every 12 hours  ferrous    sulfate 325 milliGRAM(s) Oral daily  influenza  Vaccine (HIGH DOSE) 0.7 milliLiter(s) IntraMuscular once  insulin glargine Injectable (LANTUS) 8 Unit(s) SubCutaneous at bedtime  insulin lispro (HumaLOG) corrective regimen sliding scale   SubCutaneous Before meals and at bedtime  insulin lispro Injectable (HumaLOG) 3 Unit(s) SubCutaneous three times a day before meals  liothyronine 25 MICROGram(s) Oral daily  pantoprazole    Tablet 40 milliGRAM(s) Oral before breakfast  PARoxetine 30 milliGRAM(s) Oral daily  polyethylene glycol 3350 17 Gram(s) Oral every 24 hours  QUEtiapine 400 milliGRAM(s) Oral at bedtime    MEDICATIONS  (PRN):  benzocaine 15 mG/menthol 3.6 mG (Sugar-Free) Lozenge 1 Lozenge Oral two times a day PRN Sore Throat  dextrose 40% Gel 15 Gram(s) Oral once PRN Blood Glucose LESS THAN 70 milliGRAM(s)/deciliter  glucagon  Injectable 1 milliGRAM(s) IntraMuscular once PRN Glucose LESS THAN 70 milligrams/deciliter      ALLERGIES:  Allergies    No Known Allergies    Intolerances        LABS:                        12.2   7.19  )-----------( 241      ( 08 Oct 2020 17:20 )             39.2     10-08    136  |  100  |  34<H>  ----------------------------<  145<H>  4.2   |  26  |  0.84    Ca    9.4      08 Oct 2020 17:20    TPro  6.6  /  Alb  3.1<L>  /  TBili  0.3  /  DBili  x   /  AST  9<L>  /  ALT  10  /  AlkPhos  72  10-08        CAPILLARY BLOOD GLUCOSE      POCT Blood Glucose.: 293 mg/dL (09 Oct 2020 11:33)      RADIOLOGY & ADDITIONAL TESTS: Reviewed.

## 2020-10-09 NOTE — PROGRESS NOTE ADULT - PROBLEM SELECTOR PLAN 1
Pt presented with dyspnea and was found to be hypoxemic requiring NRB supplemental oxygen. Since 10/1, she has been on NRB 10L with SpO2 92-94% and denies any worsening of her dyspnea/cough today 10/2. SpO2 noted to decrease to 85% on 10L NRB. Patient weaned off of HFNC to NC 6L 10/6.  - c/w NC 5L and wean down O2 as tolerated  - continue to monitor respiratory status  - PT recommends discharge to [facility]/SNF  - Pending COVID swabs x2 (second swab sent today 10/9) for placement

## 2020-10-09 NOTE — DISCHARGE NOTE PROVIDER - HOSPITAL COURSE
#Discharge: do not delete    Patient is 64 yo F with past medical history of Bipolar disorder, HLD, DM2, anemia, recurrent UTI, and hypothyroidism  Presented with cough and fever from nursing home, found to be hypoxic with COVID positive PCR  Problem List/Main Diagnoses (system-based):     1. COVID-19  - Pt     Inpatient treatment course:   New medications:   Labs to be followed outpatient:   Exam to be followed outpatient:      #Discharge: do not delete    Patient is 64 yo F with past medical history of Bipolar disorder, HLD, DM2, anemia, recurrent UTI, and hypothyroidism  Presented with cough and fever from nursing home, found to be hypoxic with COVID positive PCR  Problem List/Main Diagnoses (system-based):     1. COVID-19  - Pt       Inpatient treatment course: Patient is a 65 F with PMH BPD, DM, recurrent UTIs on nitrofurantoin, hypothyroidism, who presented from NH with reported Fever to 104, cough and chills, found to have Hypoxic Respiratory Failure 2/2 COVID PNA for which she was started on NRB, Antiviral, Decadron and Broad Spectrum ABx. Hospital course notable for New onset Bradycardia, different from admission for which cardiology and CCU were consulted, however both in agreement that bradyarrhythmia was not pathological. EPS also called to evaluate for sinus bradycardia with HR 30 -40 BPM. Per EPS, bradycardia could be secondary to hypoxia and underlying infection, and therefore recommended holding off on transvenous pacing at this time. Also recommended starting Dobutamine 2.5 with goal to slowly uptitrate to 5 and monitor HR response. Pt stepped up to 3W COVID MICU for closer monitoring where arterial line and huitron were placed. Pt remained asymptomatic w/ HR ranging in 60-70; dobutamine was discontinued and the pt was deemed stable for stepdown to 3W COVID RMF.    New medications:   Labs to be followed outpatient:   Exam to be followed outpatient:      #Discharge: do not delete    Patient is 66 yo F with past medical history of Bipolar disorder, HLD, DM2, anemia, recurrent UTI, and hypothyroidism    Presented with cough and fever from nursing home, found to be hypoxic with COVID positive PCR    Problem List/Main Diagnoses (system-based):     #Covid-19  Patient presented with acute hypoxic respiratory failure and was found to have PCR positive for Covid-19. She was begun on nasal cannula and weaned down as tolerated. She was treated with remdesivir and decadron and started on a steroid taper.     #type 2 diabetes  Patient has pmhx of diabetes with hgbA1c of 6.9. PO home medications were held and lantus and humulog were given.     #hypothyroidism  Patient has pmhx of hypothyroidism, continue home levothyroxine    #bipolar disorder  Patient has pmhx of bipolar disorder, continue home medications of aripiprazole 10mg q24h, depakote 1000 mg PO qhs, quetiapine 400 mg PO qhs, paroxetine 30mg PO q24h, qtc monitered.     #Urinary retention.    continue oxybutynin 5 mg BID    #iron deficiency anemia  Patient presented with iron deficiency anemia, treated wtih ferrous sulfate 325 mg PO daily.     #Sepsis  RESOLVED  Pt presented to ED with severe sepsis: lactate 3 (resolved to 0.9 after 500cc bolus), T 101.4, HR 93, RR 24. CXR showing b/l patchy infiltrates. Pt COVID+ x2 with CRP 7.57, however procalcitonin 0.32. PNA likely 2/2 COVID vs. bacterial PNA. Received Tylenol 1g, 500cc NS, Vancomycin 1750mg, Zosyn 3.375g.   -sepsis resolved at discharge  -lactate resolved from 3 to 0.9  -blood cultures showed no growth to date.     Inpatient treatment course: Patient is a 65 F with PMH BPD, DM, recurrent UTIs on nitrofurantoin, hypothyroidism, who presented from NH with reported Fever to 104, cough and chills, found to have Hypoxic Respiratory Failure 2/2 COVID PNA for which she was started on NRB, Antiviral, Decadron and Broad Spectrum ABx. Hospital course notable for New onset Bradycardia, different from admission for which cardiology and CCU were consulted, however both in agreement that bradyarrhythmia was not pathological. EPS also called to evaluate for sinus bradycardia with HR 30 -40 BPM. Per EPS, bradycardia could be secondary to hypoxia and underlying infection, and therefore recommended holding off on transvenous pacing at this time. Also recommended starting Dobutamine 2.5 with goal to slowly uptitrate to 5 and monitor HR response. Pt stepped up to 3W COVID MICU for closer monitoring where arterial line and huitron were placed. Pt remained asymptomatic w/ HR ranging in 60-70; dobutamine was discontinued and the pt was deemed stable for stepdown to 3W COVID RMF.    New medications: Prednisone and Eliquis        #Discharge: do not delete    Patient is 64 yo F with past medical history of Bipolar disorder, HLD, DM2, anemia, recurrent UTI, and hypothyroidism    Presented with cough and fever from nursing home, found to be hypoxic with COVID positive PCR    Problem List/Main Diagnoses (system-based):     #Covid-19  Patient presented with acute hypoxic respiratory failure and was found to have PCR positive for Covid-19. She was begun on nasal cannula and weaned down as tolerated. She was treated with remdesivir and decadron and started on a steroid taper.     #type 2 diabetes  Patient has pmhx of diabetes with hgbA1c of 6.9. PO home medications were held and lantus and humulog were given.     #hypothyroidism  Patient has pmhx of hypothyroidism, continue home liothyronine    #bipolar disorder  Patient has pmhx of bipolar disorder, continue home medications of aripiprazole 10mg q24h, depakote 1000 mg PO qhs, quetiapine 400 mg PO qhs, paroxetine 30mg PO q24h, qtc monitered.     #Urinary retention.    continue oxybutynin 5 mg BID    #iron deficiency anemia  Patient presented with iron deficiency anemia, treated wtih ferrous sulfate 325 mg PO daily.     #Sepsis  RESOLVED  Pt presented to ED with severe sepsis: lactate 3 (resolved to 0.9 after 500cc bolus), T 101.4, HR 93, RR 24. CXR showing b/l patchy infiltrates. Pt COVID+ x2 with CRP 7.57, however procalcitonin 0.32. PNA likely 2/2 COVID vs. bacterial PNA. Received Tylenol 1g, 500cc NS, Vancomycin 1750mg, Zosyn 3.375g.   -sepsis resolved at discharge  -lactate resolved from 3 to 0.9  -blood cultures showed no growth to date.     Inpatient treatment course: Patient is a 65 F with PMH BPD, DM, recurrent UTIs on nitrofurantoin, hypothyroidism, who presented from NH with reported Fever to 104, cough and chills, found to have Hypoxic Respiratory Failure 2/2 COVID PNA for which she was started on NRB, Antiviral, Decadron and Broad Spectrum ABx. Hospital course notable for New onset Bradycardia, different from admission for which cardiology and CCU were consulted, however both in agreement that bradyarrhythmia was not pathological. EPS also called to evaluate for sinus bradycardia with HR 30 -40 BPM. Per EPS, bradycardia could be secondary to hypoxia and underlying infection, and therefore recommended holding off on transvenous pacing at this time. Also recommended starting Dobutamine 2.5 with goal to slowly uptitrate to 5 and monitor HR response. Pt stepped up to 3W COVID MICU for closer monitoring where arterial line and huitron were placed. Pt remained asymptomatic w/ HR ranging in 60-70; dobutamine was discontinued and the pt was deemed stable for stepdown to 3W COVID RMF.    New medications: Prednisone and Eliquis

## 2020-10-09 NOTE — DISCHARGE NOTE PROVIDER - NSDCCPCAREPLAN_GEN_ALL_CORE_FT
PRINCIPAL DISCHARGE DIAGNOSIS  Diagnosis: COVID-19  Assessment and Plan of Treatment: You were admitted to [facility] because you tested positive for a COVID-19 infection, which caused you to have fever, trouble breathing, and cough. You were given Remdesivir and Decadron while in the hospital, which are medications that were used to treat your infection. Since you are now able to breathe using less supplemental oxygen when you first arrived, you are being discharged home with an Oxygen Tank. You should use this oxygen on a setting of 2 Liters while at home. In addition, it is very important that you take Prednisone and Eliquis, medications that have been sent to your pharmacy.   PREDNISONE INSTRUCTIONS: The prescription that you will  at the pharmacy has 40 pills of Prednisone 10mg in it, which are to be taken as a taper over the course of 16 days as follows: please take 4 pills (40mg total) one time per day for the first 4 days, followed by 3 pills (30mg total) one time per day for the next 4 days, followed by 2 pills (20mg total) one time per day for the next 4 days, and finally 1 pill (10mg total) one time per day for the final 4 days.   ELIQUIS INSTRUCTIONS: Please take Eliquis 2.5mg two times per day for the next 30 days. This is important as it will prevent blood clots from   Please be sure to follow up with Dr. Bello via Tele-Health appointment next week, and be sure to return to the hospital if you have worsening fever or worsening shortness of breath.       PRINCIPAL DISCHARGE DIAGNOSIS  Diagnosis: COVID-19  Assessment and Plan of Treatment: You were admitted to [facility] because you tested positive for a COVID-19 infection, which caused you to have fever, trouble breathing, and cough. You were given Remdesivir and Decadron while in the hospital, which are medications that were used to treat your infection. Since you are now able to breathe using less supplemental oxygen when you first arrived, you are being discharged home with an Oxygen Tank. You should use this oxygen on a setting of 2 Liters while at home. In addition, it is very important that you take Prednisone and Eliquis, medications that have been sent to your pharmacy.   PREDNISONE INSTRUCTIONS: The prescription that you will  at the pharmacy has 40 pills of Prednisone 10mg in it, which are to be taken as a taper over the course of 12 days as follows: please take 3 pills (30mg total) one time per day for the next 4 days, followed by 2 pills (20mg total) one time per day for the next 4 days, and finally 1 pill (10mg total) one time per day for the final 4 days.   ELIQUIS INSTRUCTIONS: Please take Eliquis 2.5mg two times per day for the next 30 days. This is important as it will prevent blood clots from   Please be sure to follow up with Dr. Bello via Tele-Health appointment next week, and be sure to return to the hospital if you have worsening fever or worsening shortness of breath.       PRINCIPAL DISCHARGE DIAGNOSIS  Diagnosis: COVID-19  Assessment and Plan of Treatment: You were admitted to [facility] because you tested positive for a COVID-19 infection, which caused you to have fever, trouble breathing, and cough. You were given Remdesivir and Decadron while in the hospital, which are medications that were used to treat your infection. Since you are now able to breathe using less supplemental oxygen when you first arrived, you are being discharged home with an Oxygen Tank. You should use this oxygen on a setting of 2 Liters while at home. In addition, it is very important that you take Prednisone and Eliquis, medications that have been sent to your pharmacy.   PREDNISONE INSTRUCTIONS: The prescription that you will  at the pharmacy has 40 pills of Prednisone 10mg in it, which are to be taken as a taper over the course of 12 days as follows: please take 3 pills (30mg total) one time per day for the next 4 days, followed by 2 pills (20mg total) one time per day for the next 4 days, and finally 1 pill (10mg total) one time per day for the final 4 days.   ELIQUIS INSTRUCTIONS: Please take Eliquis 2.5mg two times per day for the next 30 days. This is important as it will prevent blood clots from   Please be sure to follow up with Dr. Bello via Tele-Health appointment next week, and be sure to return to the hospital if you have worsening fever or worsening shortness of breath.      SECONDARY DISCHARGE DIAGNOSES  Diagnosis: Diabetes  Assessment and Plan of Treatment: While you were hear we treated your diabetes with insulin instead of your home medications. Now that we are discharging you we are going to restart your home medications and stop giving you insulin. They may continue to monitor your sugars while you are still on the sterioids. Once you complete the steroids your sugars will likely go down as well.

## 2020-10-09 NOTE — DISCHARGE NOTE PROVIDER - NSDCMRMEDTOKEN_GEN_ALL_CORE_FT
ARIPiprazole 10 mg oral tablet: 1 tab(s) orally once a day  atorvastatin 20 mg oral tablet: 1 tab(s) orally once a day  cholecalciferol 1000 intl units (25 mcg) oral capsule: 1 cap(s) orally once a day  divalproex sodium 500 mg oral delayed release tablet: 2 tab(s) orally once a day (at bedtime)  ferrous sulfate 325 mg (65 mg elemental iron) oral tablet: 1 tab(s) orally once a day  Januvia 100 mg oral tablet: 1 tab(s) orally once a day  liothyronine 25 mcg oral tablet: 1 tab(s) orally once a day  metFORMIN 1000 mg oral tablet: 1 tab(s) orally 2 times a day  nitrofurantoin macrocrystals 50 mg oral capsule: 1 cap(s) orally once a day  oxybutynin 10 mg/24 hr oral tablet, extended release: 1 tab(s) orally once a day  PARoxetine 30 mg oral tablet: 1 tab(s) orally once a day  polyethylene glycol 3350 oral powder for reconstitution: 17 gram(s) orally every 24 hours  QUEtiapine 200 mg oral tablet: 2 tab(s) orally once a day (at bedtime)  Vascepa 1 g oral capsule: 2 cap(s) orally 2 times a day   ARIPiprazole 10 mg oral tablet: 1 tab(s) orally once a day  atorvastatin 20 mg oral tablet: 1 tab(s) orally once a day  cholecalciferol 1000 intl units (25 mcg) oral capsule: 1 cap(s) orally once a day  divalproex sodium 500 mg oral delayed release tablet: 2 tab(s) orally once a day (at bedtime)  ferrous sulfate 325 mg (65 mg elemental iron) oral tablet: 1 tab(s) orally once a day  Januvia 100 mg oral tablet: 1 tab(s) orally once a day  liothyronine 25 mcg oral tablet: 1 tab(s) orally once a day  metFORMIN 1000 mg oral tablet: 1 tab(s) orally 2 times a day  nitrofurantoin macrocrystals 50 mg oral capsule: 1 cap(s) orally once a day  oxybutynin 10 mg/24 hr oral tablet, extended release: 1 tab(s) orally once a day  pantoprazole 40 mg oral delayed release tablet: 1 tab(s) orally once a day (before a meal)  PARoxetine 30 mg oral tablet: 1 tab(s) orally once a day  polyethylene glycol 3350 oral powder for reconstitution: 17 gram(s) orally every 24 hours  predniSONE 10 mg oral tablet: 3 tab(s) orally once a day  predniSONE 10 mg oral tablet: 1 tab(s) orally once a day  predniSONE 20 mg oral tablet: 1 tab(s) orally once a day  QUEtiapine 200 mg oral tablet: 2 tab(s) orally once a day (at bedtime)  Vascepa 1 g oral capsule: 2 cap(s) orally 2 times a day   ARIPiprazole 10 mg oral tablet: 1 tab(s) orally once a day  atorvastatin 20 mg oral tablet: 1 tab(s) orally once a day  cholecalciferol 1000 intl units (25 mcg) oral capsule: 1 cap(s) orally once a day  divalproex sodium 500 mg oral delayed release tablet: 2 tab(s) orally once a day (at bedtime)  Eliquis 2.5 mg oral tablet: 1 tab(s) orally 2 times a day   ferrous sulfate 325 mg (65 mg elemental iron) oral tablet: 1 tab(s) orally once a day  Januvia 100 mg oral tablet: 1 tab(s) orally once a day  liothyronine 25 mcg oral tablet: 1 tab(s) orally once a day  metFORMIN 1000 mg oral tablet: 1 tab(s) orally 2 times a day  nitrofurantoin macrocrystals 50 mg oral capsule: 1 cap(s) orally once a day  oxybutynin 10 mg/24 hr oral tablet, extended release: 1 tab(s) orally once a day  pantoprazole 40 mg oral delayed release tablet: 1 tab(s) orally once a day (before a meal)  PARoxetine 30 mg oral tablet: 1 tab(s) orally once a day  polyethylene glycol 3350 oral powder for reconstitution: 17 gram(s) orally every 24 hours  predniSONE 10 mg oral tablet: 3 tab(s) orally once a day  predniSONE 10 mg oral tablet: 1 tab(s) orally once a day  predniSONE 20 mg oral tablet: 1 tab(s) orally once a day  QUEtiapine 200 mg oral tablet: 2 tab(s) orally once a day (at bedtime)  Vascepa 1 g oral capsule: 2 cap(s) orally 2 times a day

## 2020-10-10 LAB
ALBUMIN SERPL ELPH-MCNC: 3.1 G/DL — LOW (ref 3.3–5)
ALP SERPL-CCNC: 67 U/L — SIGNIFICANT CHANGE UP (ref 40–120)
ALT FLD-CCNC: 13 U/L — SIGNIFICANT CHANGE UP (ref 10–45)
ANION GAP SERPL CALC-SCNC: 11 MMOL/L — SIGNIFICANT CHANGE UP (ref 5–17)
AST SERPL-CCNC: 14 U/L — SIGNIFICANT CHANGE UP (ref 10–40)
BASOPHILS # BLD AUTO: 0.02 K/UL — SIGNIFICANT CHANGE UP (ref 0–0.2)
BASOPHILS NFR BLD AUTO: 0.3 % — SIGNIFICANT CHANGE UP (ref 0–2)
BILIRUB SERPL-MCNC: 0.5 MG/DL — SIGNIFICANT CHANGE UP (ref 0.2–1.2)
BUN SERPL-MCNC: 30 MG/DL — HIGH (ref 7–23)
CALCIUM SERPL-MCNC: 9 MG/DL — SIGNIFICANT CHANGE UP (ref 8.4–10.5)
CHLORIDE SERPL-SCNC: 103 MMOL/L — SIGNIFICANT CHANGE UP (ref 96–108)
CO2 SERPL-SCNC: 25 MMOL/L — SIGNIFICANT CHANGE UP (ref 22–31)
CREAT SERPL-MCNC: 0.86 MG/DL — SIGNIFICANT CHANGE UP (ref 0.5–1.3)
CRP SERPL-MCNC: 0.35 MG/DL — SIGNIFICANT CHANGE UP (ref 0–0.4)
D DIMER BLD IA.RAPID-MCNC: 2798 NG/ML DDU — HIGH
EOSINOPHIL # BLD AUTO: 0.06 K/UL — SIGNIFICANT CHANGE UP (ref 0–0.5)
EOSINOPHIL NFR BLD AUTO: 0.9 % — SIGNIFICANT CHANGE UP (ref 0–6)
FERRITIN SERPL-MCNC: 123 NG/ML — SIGNIFICANT CHANGE UP (ref 15–150)
GLUCOSE BLDC GLUCOMTR-MCNC: 158 MG/DL — HIGH (ref 70–99)
GLUCOSE BLDC GLUCOMTR-MCNC: 181 MG/DL — HIGH (ref 70–99)
GLUCOSE BLDC GLUCOMTR-MCNC: 69 MG/DL — LOW (ref 70–99)
GLUCOSE BLDC GLUCOMTR-MCNC: 85 MG/DL — SIGNIFICANT CHANGE UP (ref 70–99)
GLUCOSE BLDC GLUCOMTR-MCNC: 91 MG/DL — SIGNIFICANT CHANGE UP (ref 70–99)
GLUCOSE SERPL-MCNC: 95 MG/DL — SIGNIFICANT CHANGE UP (ref 70–99)
HCT VFR BLD CALC: 39 % — SIGNIFICANT CHANGE UP (ref 34.5–45)
HGB BLD-MCNC: 12.1 G/DL — SIGNIFICANT CHANGE UP (ref 11.5–15.5)
IMM GRANULOCYTES NFR BLD AUTO: 2.4 % — HIGH (ref 0–1.5)
LYMPHOCYTES # BLD AUTO: 2.8 K/UL — SIGNIFICANT CHANGE UP (ref 1–3.3)
LYMPHOCYTES # BLD AUTO: 41.6 % — SIGNIFICANT CHANGE UP (ref 13–44)
MAGNESIUM SERPL-MCNC: 2 MG/DL — SIGNIFICANT CHANGE UP (ref 1.6–2.6)
MCHC RBC-ENTMCNC: 25.4 PG — LOW (ref 27–34)
MCHC RBC-ENTMCNC: 31 GM/DL — LOW (ref 32–36)
MCV RBC AUTO: 81.9 FL — SIGNIFICANT CHANGE UP (ref 80–100)
MONOCYTES # BLD AUTO: 0.46 K/UL — SIGNIFICANT CHANGE UP (ref 0–0.9)
MONOCYTES NFR BLD AUTO: 6.8 % — SIGNIFICANT CHANGE UP (ref 2–14)
NEUTROPHILS # BLD AUTO: 3.23 K/UL — SIGNIFICANT CHANGE UP (ref 1.8–7.4)
NEUTROPHILS NFR BLD AUTO: 48 % — SIGNIFICANT CHANGE UP (ref 43–77)
NRBC # BLD: 0 /100 WBCS — SIGNIFICANT CHANGE UP (ref 0–0)
PHOSPHATE SERPL-MCNC: 3.5 MG/DL — SIGNIFICANT CHANGE UP (ref 2.5–4.5)
PLATELET # BLD AUTO: 176 K/UL — SIGNIFICANT CHANGE UP (ref 150–400)
POTASSIUM SERPL-MCNC: 4.2 MMOL/L — SIGNIFICANT CHANGE UP (ref 3.5–5.3)
POTASSIUM SERPL-SCNC: 4.2 MMOL/L — SIGNIFICANT CHANGE UP (ref 3.5–5.3)
PROT SERPL-MCNC: 6.5 G/DL — SIGNIFICANT CHANGE UP (ref 6–8.3)
RBC # BLD: 4.76 M/UL — SIGNIFICANT CHANGE UP (ref 3.8–5.2)
RBC # FLD: 16.2 % — HIGH (ref 10.3–14.5)
SODIUM SERPL-SCNC: 139 MMOL/L — SIGNIFICANT CHANGE UP (ref 135–145)
WBC # BLD: 6.73 K/UL — SIGNIFICANT CHANGE UP (ref 3.8–10.5)
WBC # FLD AUTO: 6.73 K/UL — SIGNIFICANT CHANGE UP (ref 3.8–10.5)

## 2020-10-10 PROCEDURE — 99233 SBSQ HOSP IP/OBS HIGH 50: CPT | Mod: CS,GC

## 2020-10-10 RX ADMIN — ARIPIPRAZOLE 10 MILLIGRAM(S): 15 TABLET ORAL at 11:49

## 2020-10-10 RX ADMIN — Medication 3 UNIT(S): at 12:12

## 2020-10-10 RX ADMIN — POLYETHYLENE GLYCOL 3350 17 GRAM(S): 17 POWDER, FOR SOLUTION ORAL at 05:44

## 2020-10-10 RX ADMIN — Medication 30 MILLIGRAM(S): at 12:02

## 2020-10-10 RX ADMIN — Medication 3 UNIT(S): at 17:51

## 2020-10-10 RX ADMIN — ENOXAPARIN SODIUM 40 MILLIGRAM(S): 100 INJECTION SUBCUTANEOUS at 05:43

## 2020-10-10 RX ADMIN — PANTOPRAZOLE SODIUM 40 MILLIGRAM(S): 20 TABLET, DELAYED RELEASE ORAL at 05:43

## 2020-10-10 RX ADMIN — DIVALPROEX SODIUM 1000 MILLIGRAM(S): 500 TABLET, DELAYED RELEASE ORAL at 22:06

## 2020-10-10 RX ADMIN — INSULIN GLARGINE 8 UNIT(S): 100 INJECTION, SOLUTION SUBCUTANEOUS at 22:05

## 2020-10-10 RX ADMIN — Medication 2: at 22:04

## 2020-10-10 RX ADMIN — ENOXAPARIN SODIUM 40 MILLIGRAM(S): 100 INJECTION SUBCUTANEOUS at 17:52

## 2020-10-10 RX ADMIN — Medication 325 MILLIGRAM(S): at 11:49

## 2020-10-10 RX ADMIN — LIOTHYRONINE SODIUM 25 MICROGRAM(S): 25 TABLET ORAL at 05:43

## 2020-10-10 RX ADMIN — Medication 1000 UNIT(S): at 11:49

## 2020-10-10 RX ADMIN — QUETIAPINE FUMARATE 400 MILLIGRAM(S): 200 TABLET, FILM COATED ORAL at 22:05

## 2020-10-10 RX ADMIN — Medication 2: at 12:12

## 2020-10-10 RX ADMIN — Medication 3 UNIT(S): at 08:24

## 2020-10-10 RX ADMIN — ATORVASTATIN CALCIUM 20 MILLIGRAM(S): 80 TABLET, FILM COATED ORAL at 22:03

## 2020-10-10 NOTE — PROGRESS NOTE ADULT - SUBJECTIVE AND OBJECTIVE BOX
**incompete note***    INTERVAL HPI/OVERNIGHT EVENTS:    SUBJECTIVE: Patient seen and examined at bedside.    OBJECTIVE:    VITAL SIGNS:  ICU Vital Signs Last 24 Hrs  T(C): 36.3 (10 Oct 2020 12:45), Max: 36.6 (09 Oct 2020 18:14)  T(F): 97.4 (10 Oct 2020 12:45), Max: 97.9 (09 Oct 2020 18:14)  HR: 52 (10 Oct 2020 12:00) (40 - 52)  BP: 114/63 (10 Oct 2020 12:00) (114/63 - 150/83)  BP(mean): --  ABP: --  ABP(mean): --  RR: 25 (10 Oct 2020 12:47) (19 - 35)  SpO2: 95% (10 Oct 2020 12:47) (92% - 97%)        10-09 @ 07:01  -  10-10 @ 07:00  --------------------------------------------------------  IN: 0 mL / OUT: 375 mL / NET: -375 mL      CAPILLARY BLOOD GLUCOSE      POCT Blood Glucose.: 181 mg/dL (10 Oct 2020 12:06)      PHYSICAL EXAM:    MEDICATIONS:  MEDICATIONS  (STANDING):  ARIPiprazole 10 milliGRAM(s) Oral daily  atorvastatin 20 milliGRAM(s) Oral at bedtime  cholecalciferol 1000 Unit(s) Oral daily  dextrose 5%. 1000 milliLiter(s) (50 mL/Hr) IV Continuous <Continuous>  dextrose 50% Injectable 12.5 Gram(s) IV Push once  dextrose 50% Injectable 25 Gram(s) IV Push once  dextrose 50% Injectable 25 Gram(s) IV Push once  diVALproex ER 1000 milliGRAM(s) Oral at bedtime  enoxaparin Injectable 40 milliGRAM(s) SubCutaneous every 12 hours  ferrous    sulfate 325 milliGRAM(s) Oral daily  influenza  Vaccine (HIGH DOSE) 0.7 milliLiter(s) IntraMuscular once  insulin glargine Injectable (LANTUS) 8 Unit(s) SubCutaneous at bedtime  insulin lispro (HumaLOG) corrective regimen sliding scale   SubCutaneous Before meals and at bedtime  insulin lispro Injectable (HumaLOG) 3 Unit(s) SubCutaneous three times a day before meals  liothyronine 25 MICROGram(s) Oral daily  pantoprazole    Tablet 40 milliGRAM(s) Oral before breakfast  PARoxetine 30 milliGRAM(s) Oral daily  polyethylene glycol 3350 17 Gram(s) Oral every 24 hours  QUEtiapine 400 milliGRAM(s) Oral at bedtime    MEDICATIONS  (PRN):  benzocaine 15 mG/menthol 3.6 mG (Sugar-Free) Lozenge 1 Lozenge Oral two times a day PRN Sore Throat  dextrose 40% Gel 15 Gram(s) Oral once PRN Blood Glucose LESS THAN 70 milliGRAM(s)/deciliter  glucagon  Injectable 1 milliGRAM(s) IntraMuscular once PRN Glucose LESS THAN 70 milligrams/deciliter      ALLERGIES:  Allergies    No Known Allergies    Intolerances        LABS:                        12.1   6.73  )-----------( 176      ( 10 Oct 2020 08:08 )             39.0     10-10    139  |  103  |  30<H>  ----------------------------<  95  4.2   |  25  |  0.86    Ca    9.0      10 Oct 2020 08:08  Phos  3.5     10-10  Mg     2.0     10-10    TPro  6.5  /  Alb  3.1<L>  /  TBili  0.5  /  DBili  x   /  AST  14  /  ALT  13  /  AlkPhos  67  10-10          RADIOLOGY & ADDITIONAL TESTS: Reviewed. INTERVAL HPI/OVERNIGHT EVENTS: No acute events overnight.     SUBJECTIVE: Patient seen and examined at bedside. On 6L NC breathing comfortably. Feels subjectively well. Denies chest pain, leg pain, dyspnea, cough, fever, abdominal pain, n/v/, d/c.    OBJECTIVE:    VITAL SIGNS:  ICU Vital Signs Last 24 Hrs  T(C): 36.3 (10 Oct 2020 12:45), Max: 36.6 (09 Oct 2020 18:14)  T(F): 97.4 (10 Oct 2020 12:45), Max: 97.9 (09 Oct 2020 18:14)  HR: 52 (10 Oct 2020 12:00) (40 - 52)  BP: 114/63 (10 Oct 2020 12:00) (114/63 - 150/83)  BP(mean): --  ABP: --  ABP(mean): --  RR: 25 (10 Oct 2020 12:47) (19 - 35)  SpO2: 95% (10 Oct 2020 12:47) (92% - 97%)        10-09 @ 07:01  -  10-10 @ 07:00  --------------------------------------------------------  IN: 0 mL / OUT: 375 mL / NET: -375 mL      CAPILLARY BLOOD GLUCOSE      POCT Blood Glucose.: 181 mg/dL (10 Oct 2020 12:06)      PHYSICAL EXAM:  General: WDWN female breathing on 6L NC O2   HEENT: NC/AT; PERRL, anicteric sclera; MMM  Neck: supple  Cardiovascular: +S1/S2, RRR  Respiratory: CTA B/L; no W/R/R  Gastrointestinal: soft, NT/ND; +BSx4  Extremities: WWP; no edema, clubbing or cyanosis, no warmth/erythema or swelling.   Vascular: 2+ radial, DP/PT pulses B/L  Neurological: AAOx3; no focal deficits      MEDICATIONS:  MEDICATIONS  (STANDING):  ARIPiprazole 10 milliGRAM(s) Oral daily  atorvastatin 20 milliGRAM(s) Oral at bedtime  cholecalciferol 1000 Unit(s) Oral daily  dextrose 5%. 1000 milliLiter(s) (50 mL/Hr) IV Continuous <Continuous>  dextrose 50% Injectable 12.5 Gram(s) IV Push once  dextrose 50% Injectable 25 Gram(s) IV Push once  dextrose 50% Injectable 25 Gram(s) IV Push once  diVALproex ER 1000 milliGRAM(s) Oral at bedtime  enoxaparin Injectable 40 milliGRAM(s) SubCutaneous every 12 hours  ferrous    sulfate 325 milliGRAM(s) Oral daily  influenza  Vaccine (HIGH DOSE) 0.7 milliLiter(s) IntraMuscular once  insulin glargine Injectable (LANTUS) 8 Unit(s) SubCutaneous at bedtime  insulin lispro (HumaLOG) corrective regimen sliding scale   SubCutaneous Before meals and at bedtime  insulin lispro Injectable (HumaLOG) 3 Unit(s) SubCutaneous three times a day before meals  liothyronine 25 MICROGram(s) Oral daily  pantoprazole    Tablet 40 milliGRAM(s) Oral before breakfast  PARoxetine 30 milliGRAM(s) Oral daily  polyethylene glycol 3350 17 Gram(s) Oral every 24 hours  QUEtiapine 400 milliGRAM(s) Oral at bedtime    MEDICATIONS  (PRN):  benzocaine 15 mG/menthol 3.6 mG (Sugar-Free) Lozenge 1 Lozenge Oral two times a day PRN Sore Throat  dextrose 40% Gel 15 Gram(s) Oral once PRN Blood Glucose LESS THAN 70 milliGRAM(s)/deciliter  glucagon  Injectable 1 milliGRAM(s) IntraMuscular once PRN Glucose LESS THAN 70 milligrams/deciliter      ALLERGIES:  Allergies    No Known Allergies    Intolerances        LABS:                        12.1   6.73  )-----------( 176      ( 10 Oct 2020 08:08 )             39.0     10-10    139  |  103  |  30<H>  ----------------------------<  95  4.2   |  25  |  0.86    Ca    9.0      10 Oct 2020 08:08  Phos  3.5     10-10  Mg     2.0     10-10    TPro  6.5  /  Alb  3.1<L>  /  TBili  0.5  /  DBili  x   /  AST  14  /  ALT  13  /  AlkPhos  67  10-10          RADIOLOGY & ADDITIONAL TESTS: Reviewed.

## 2020-10-10 NOTE — PROGRESS NOTE ADULT - NUTRITIONAL ASSESSMENT
This patient has been assessed with a concern for Malnutrition and has been determined to have a diagnosis/diagnoses of Morbid obesity/BMI > 40.    This patient is being managed with:   Diet Consistent Carbohydrate/No Snacks-  Kosher  Entered: Sep 29 2020 10:36PM    

## 2020-10-10 NOTE — PROGRESS NOTE ADULT - PROBLEM SELECTOR PLAN 1
Pt presented with dyspnea and was found to be hypoxemic requiring NRB supplemental oxygen. Since 10/1, she has been on NRB 10L with SpO2 92-94% and denies any worsening of her dyspnea/cough today 10/2. SpO2 noted to decrease to 85% on 10L NRB. Patient weaned off of HFNC to NC 6L 10/6.  - c/w NC 5L and wean down O2 as tolerated  - continue to monitor respiratory status  - PT recommends discharge to [facility]SNF  - Pending COVID swabs x2 (second swab sent today 10/9) for placement Pt presented with dyspnea and was found to be hypoxemic requiring NRB supplemental oxygen. Since 10/1, she has been on NRB 10L with SpO2 92-94% and denies any worsening of her dyspnea/cough today 10/2. SpO2 noted to decrease to 85% on 10L NRB. Patient weaned off of HFNC to NC 6L 10/6.  - c/w NC 5-6L and wean down O2 as tolerated  - continue to monitor respiratory status  - PT recommends discharge to [facility]/SNF  - Pending COVID swabs x2 (second swab sent today 10/9) for placement  -no placement yet as of 10/10 as per

## 2020-10-11 LAB
ALBUMIN SERPL ELPH-MCNC: 3.3 G/DL — SIGNIFICANT CHANGE UP (ref 3.3–5)
ALP SERPL-CCNC: 70 U/L — SIGNIFICANT CHANGE UP (ref 40–120)
ALT FLD-CCNC: 13 U/L — SIGNIFICANT CHANGE UP (ref 10–45)
ANION GAP SERPL CALC-SCNC: 10 MMOL/L — SIGNIFICANT CHANGE UP (ref 5–17)
AST SERPL-CCNC: 12 U/L — SIGNIFICANT CHANGE UP (ref 10–40)
BASOPHILS # BLD AUTO: 0.02 K/UL — SIGNIFICANT CHANGE UP (ref 0–0.2)
BASOPHILS NFR BLD AUTO: 0.3 % — SIGNIFICANT CHANGE UP (ref 0–2)
BILIRUB SERPL-MCNC: 0.6 MG/DL — SIGNIFICANT CHANGE UP (ref 0.2–1.2)
BUN SERPL-MCNC: 25 MG/DL — HIGH (ref 7–23)
CALCIUM SERPL-MCNC: 9.2 MG/DL — SIGNIFICANT CHANGE UP (ref 8.4–10.5)
CHLORIDE SERPL-SCNC: 102 MMOL/L — SIGNIFICANT CHANGE UP (ref 96–108)
CO2 SERPL-SCNC: 29 MMOL/L — SIGNIFICANT CHANGE UP (ref 22–31)
CREAT SERPL-MCNC: 0.83 MG/DL — SIGNIFICANT CHANGE UP (ref 0.5–1.3)
CRP SERPL-MCNC: 0.42 MG/DL — HIGH (ref 0–0.4)
D DIMER BLD IA.RAPID-MCNC: 2599 NG/ML DDU — HIGH
EOSINOPHIL # BLD AUTO: 0.08 K/UL — SIGNIFICANT CHANGE UP (ref 0–0.5)
EOSINOPHIL NFR BLD AUTO: 1.2 % — SIGNIFICANT CHANGE UP (ref 0–6)
FERRITIN SERPL-MCNC: 131 NG/ML — SIGNIFICANT CHANGE UP (ref 15–150)
GLUCOSE BLDC GLUCOMTR-MCNC: 210 MG/DL — HIGH (ref 70–99)
GLUCOSE BLDC GLUCOMTR-MCNC: 251 MG/DL — HIGH (ref 70–99)
GLUCOSE BLDC GLUCOMTR-MCNC: 87 MG/DL — SIGNIFICANT CHANGE UP (ref 70–99)
GLUCOSE BLDC GLUCOMTR-MCNC: 88 MG/DL — SIGNIFICANT CHANGE UP (ref 70–99)
GLUCOSE BLDC GLUCOMTR-MCNC: 92 MG/DL — SIGNIFICANT CHANGE UP (ref 70–99)
GLUCOSE SERPL-MCNC: 92 MG/DL — SIGNIFICANT CHANGE UP (ref 70–99)
HCT VFR BLD CALC: 39.9 % — SIGNIFICANT CHANGE UP (ref 34.5–45)
HGB BLD-MCNC: 12.3 G/DL — SIGNIFICANT CHANGE UP (ref 11.5–15.5)
IMM GRANULOCYTES NFR BLD AUTO: 2.3 % — HIGH (ref 0–1.5)
LYMPHOCYTES # BLD AUTO: 2.61 K/UL — SIGNIFICANT CHANGE UP (ref 1–3.3)
LYMPHOCYTES # BLD AUTO: 39.8 % — SIGNIFICANT CHANGE UP (ref 13–44)
MAGNESIUM SERPL-MCNC: 2 MG/DL — SIGNIFICANT CHANGE UP (ref 1.6–2.6)
MCHC RBC-ENTMCNC: 24.6 PG — LOW (ref 27–34)
MCHC RBC-ENTMCNC: 30.8 GM/DL — LOW (ref 32–36)
MCV RBC AUTO: 80 FL — SIGNIFICANT CHANGE UP (ref 80–100)
MONOCYTES # BLD AUTO: 0.45 K/UL — SIGNIFICANT CHANGE UP (ref 0–0.9)
MONOCYTES NFR BLD AUTO: 6.9 % — SIGNIFICANT CHANGE UP (ref 2–14)
NEUTROPHILS # BLD AUTO: 3.24 K/UL — SIGNIFICANT CHANGE UP (ref 1.8–7.4)
NEUTROPHILS NFR BLD AUTO: 49.5 % — SIGNIFICANT CHANGE UP (ref 43–77)
NRBC # BLD: 0 /100 WBCS — SIGNIFICANT CHANGE UP (ref 0–0)
PHOSPHATE SERPL-MCNC: 4 MG/DL — SIGNIFICANT CHANGE UP (ref 2.5–4.5)
PLATELET # BLD AUTO: 163 K/UL — SIGNIFICANT CHANGE UP (ref 150–400)
POTASSIUM SERPL-MCNC: 3.8 MMOL/L — SIGNIFICANT CHANGE UP (ref 3.5–5.3)
POTASSIUM SERPL-SCNC: 3.8 MMOL/L — SIGNIFICANT CHANGE UP (ref 3.5–5.3)
PROT SERPL-MCNC: 6.5 G/DL — SIGNIFICANT CHANGE UP (ref 6–8.3)
RBC # BLD: 4.99 M/UL — SIGNIFICANT CHANGE UP (ref 3.8–5.2)
RBC # FLD: 16.3 % — HIGH (ref 10.3–14.5)
SODIUM SERPL-SCNC: 141 MMOL/L — SIGNIFICANT CHANGE UP (ref 135–145)
WBC # BLD: 6.55 K/UL — SIGNIFICANT CHANGE UP (ref 3.8–10.5)
WBC # FLD AUTO: 6.55 K/UL — SIGNIFICANT CHANGE UP (ref 3.8–10.5)

## 2020-10-11 PROCEDURE — 99233 SBSQ HOSP IP/OBS HIGH 50: CPT | Mod: CS,GC

## 2020-10-11 RX ORDER — INSULIN LISPRO 100/ML
2 VIAL (ML) SUBCUTANEOUS
Refills: 0 | Status: DISCONTINUED | OUTPATIENT
Start: 2020-10-11 | End: 2020-10-14

## 2020-10-11 RX ORDER — INSULIN GLARGINE 100 [IU]/ML
6 INJECTION, SOLUTION SUBCUTANEOUS AT BEDTIME
Refills: 0 | Status: DISCONTINUED | OUTPATIENT
Start: 2020-10-11 | End: 2020-10-14

## 2020-10-11 RX ADMIN — Medication 3 UNIT(S): at 13:04

## 2020-10-11 RX ADMIN — QUETIAPINE FUMARATE 400 MILLIGRAM(S): 200 TABLET, FILM COATED ORAL at 21:34

## 2020-10-11 RX ADMIN — ENOXAPARIN SODIUM 40 MILLIGRAM(S): 100 INJECTION SUBCUTANEOUS at 06:02

## 2020-10-11 RX ADMIN — Medication 30 MILLIGRAM(S): at 11:50

## 2020-10-11 RX ADMIN — ARIPIPRAZOLE 10 MILLIGRAM(S): 15 TABLET ORAL at 11:50

## 2020-10-11 RX ADMIN — PANTOPRAZOLE SODIUM 40 MILLIGRAM(S): 20 TABLET, DELAYED RELEASE ORAL at 06:02

## 2020-10-11 RX ADMIN — Medication 4: at 21:33

## 2020-10-11 RX ADMIN — ATORVASTATIN CALCIUM 20 MILLIGRAM(S): 80 TABLET, FILM COATED ORAL at 21:34

## 2020-10-11 RX ADMIN — ENOXAPARIN SODIUM 40 MILLIGRAM(S): 100 INJECTION SUBCUTANEOUS at 18:03

## 2020-10-11 RX ADMIN — Medication 1000 UNIT(S): at 11:50

## 2020-10-11 RX ADMIN — LIOTHYRONINE SODIUM 25 MICROGRAM(S): 25 TABLET ORAL at 06:02

## 2020-10-11 RX ADMIN — DIVALPROEX SODIUM 1000 MILLIGRAM(S): 500 TABLET, DELAYED RELEASE ORAL at 21:33

## 2020-10-11 RX ADMIN — Medication 325 MILLIGRAM(S): at 11:50

## 2020-10-11 RX ADMIN — Medication 6: at 13:04

## 2020-10-11 RX ADMIN — Medication 40 MILLIGRAM(S): at 21:35

## 2020-10-11 RX ADMIN — INSULIN GLARGINE 6 UNIT(S): 100 INJECTION, SOLUTION SUBCUTANEOUS at 21:33

## 2020-10-11 RX ADMIN — POLYETHYLENE GLYCOL 3350 17 GRAM(S): 17 POWDER, FOR SOLUTION ORAL at 06:02

## 2020-10-11 NOTE — PROGRESS NOTE ADULT - PROBLEM SELECTOR PLAN 4
HgbA1c of 6.9  - Hold home medications, Januvia and Metformin  - Adjust Lantus dose to 6U + 2U Humalog pre-meal   - monitor FSG

## 2020-10-11 NOTE — PROGRESS NOTE ADULT - PROBLEM SELECTOR PLAN 1
2/2 COVID-19. Improving inflammatory markers. Imroving O2 requirements - currently on 5L NC with O2 sat 94%.  - c/w NC 5L and wean down O2 as tolerated  - continue to monitor respiratory status  - PT recommends discharge to [facility]/SNF  - Pending COVID swabs x2 (second swab sent today 10/9) for placement  -no placement yet as of 10/10 as per

## 2020-10-11 NOTE — PROVIDER CONTACT NOTE (OTHER) - SITUATION
Pt noted to have rectal temp of 96.5 at 0536 AM. Dr Cantrell made aware. Follow up was not made by provider and orders were not provided

## 2020-10-11 NOTE — PROGRESS NOTE ADULT - ASSESSMENT
64yo F with PMH BPD, DM, recurrent UTIs on nitrofurantoin, hypothyroidism, who presented from NH with reported Fever to 104, cough and chills, found to have Hypoxic Respiratory Failure 2/2 COVID PNA for which she was started on NRB->high flow, Antiviral, Decadron and Broad Spectrum ABx. Hospital course notable for New onset presymptomatic Bradycardia.     66yo F with PMH BPD, DM, hypothyroidism admitted with COVID-19 pneumonia.

## 2020-10-11 NOTE — PROGRESS NOTE ADULT - PROBLEM SELECTOR PLAN 3
Pt COVID positive x2, presenting with fever of 101.4F in ED on 9/29.  - contact/droplet isolation precautions  - c/w NC 5L as tolerated   - s/p 8 days of Remdesivir  - c/w Decadron IV 6mg daily x 10days (first dose 9/30 12 AM)

## 2020-10-11 NOTE — PROGRESS NOTE ADULT - SUBJECTIVE AND OBJECTIVE BOX
INTERVAL HPI/OVERNIGHT EVENTS: No acute events overnight.     SUBJECTIVE:     Patient seen and examined at bedside. On 5L NC breathing comfortably. Feels subjectively well.      OBJECTIVE:        VITAL SIGNS:  ICU Vital Signs Last 24 Hrs  T(C): 36.3 (10 Oct 2020 12:45), Max: 36.6 (09 Oct 2020 18:14)  T(F): 97.4 (10 Oct 2020 12:45), Max: 97.9 (09 Oct 2020 18:14)  HR: 52 (10 Oct 2020 12:00) (40 - 52)  BP: 114/63 (10 Oct 2020 12:00) (114/63 - 150/83)  BP(mean): --  ABP: --  ABP(mean): --  RR: 25 (10 Oct 2020 12:47) (19 - 35)  SpO2: 95% (10 Oct 2020 12:47) (92% - 97%)        10-09 @ 07:01  -  10-10 @ 07:00  --------------------------------------------------------  IN: 0 mL / OUT: 375 mL / NET: -375 mL      CAPILLARY BLOOD GLUCOSE      POCT Blood Glucose.: 181 mg/dL (10 Oct 2020 12:06)      PHYSICAL EXAM:    General: WDWN female breathing on 6L NC O2   HEENT: NC/AT; PERRL, anicteric sclera; MMM  Neck: supple  Cardiovascular: +S1/S2, RRR  Respiratory: CTA B/L; no W/R/R  Gastrointestinal: soft, NT/ND; +BSx4  Extremities: WWP; no edema, clubbing or cyanosis, no warmth/erythema or swelling.   Vascular: 2+ radial, DP/PT pulses B/L  Neurological: AAOx3; no focal deficits      MEDICATIONS:  MEDICATIONS  (STANDING):  ARIPiprazole 10 milliGRAM(s) Oral daily  atorvastatin 20 milliGRAM(s) Oral at bedtime  cholecalciferol 1000 Unit(s) Oral daily  dextrose 5%. 1000 milliLiter(s) (50 mL/Hr) IV Continuous <Continuous>  dextrose 50% Injectable 12.5 Gram(s) IV Push once  dextrose 50% Injectable 25 Gram(s) IV Push once  dextrose 50% Injectable 25 Gram(s) IV Push once  diVALproex ER 1000 milliGRAM(s) Oral at bedtime  enoxaparin Injectable 40 milliGRAM(s) SubCutaneous every 12 hours  ferrous    sulfate 325 milliGRAM(s) Oral daily  influenza  Vaccine (HIGH DOSE) 0.7 milliLiter(s) IntraMuscular once  insulin glargine Injectable (LANTUS) 8 Unit(s) SubCutaneous at bedtime  insulin lispro (HumaLOG) corrective regimen sliding scale   SubCutaneous Before meals and at bedtime  insulin lispro Injectable (HumaLOG) 3 Unit(s) SubCutaneous three times a day before meals  liothyronine 25 MICROGram(s) Oral daily  pantoprazole    Tablet 40 milliGRAM(s) Oral before breakfast  PARoxetine 30 milliGRAM(s) Oral daily  polyethylene glycol 3350 17 Gram(s) Oral every 24 hours  QUEtiapine 400 milliGRAM(s) Oral at bedtime    MEDICATIONS  (PRN):  benzocaine 15 mG/menthol 3.6 mG (Sugar-Free) Lozenge 1 Lozenge Oral two times a day PRN Sore Throat  dextrose 40% Gel 15 Gram(s) Oral once PRN Blood Glucose LESS THAN 70 milliGRAM(s)/deciliter  glucagon  Injectable 1 milliGRAM(s) IntraMuscular once PRN Glucose LESS THAN 70 milligrams/deciliter      ALLERGIES:  Allergies    No Known Allergies    Intolerances        LABS:                        12.1   6.73  )-----------( 176      ( 10 Oct 2020 08:08 )             39.0     10-10    139  |  103  |  30<H>  ----------------------------<  95  4.2   |  25  |  0.86    Ca    9.0      10 Oct 2020 08:08  Phos  3.5     10-10  Mg     2.0     10-10    TPro  6.5  /  Alb  3.1<L>  /  TBili  0.5  /  DBili  x   /  AST  14  /  ALT  13  /  AlkPhos  67  10-10          RADIOLOGY & ADDITIONAL TESTS: Reviewed. INTERVAL HPI/OVERNIGHT EVENTS:   No acute events overnight.   Today Lantus decreased to 6 from 8. Premeal Insulin from 3 to 2.     SUBJECTIVE:   Patient seen and examined at bedside. On 5L NC breathing comfortably. Feels subjectively well.      OBJECTIVE:    LABS:                         12.3   6.55  )-----------( 163      ( 11 Oct 2020 05:57 )             39.9     10-11    141  |  102  |  25<H>  ----------------------------<  92  3.8   |  29  |  0.83    Ca    9.2      11 Oct 2020 05:57  Phos  4.0     10-11  Mg     2.0     10-11    TPro  6.5  /  Alb  3.3  /  TBili  0.6  /  DBili  x   /  AST  12  /  ALT  13  /  AlkPhos  70  10-11                  RADIOLOGY, EKG & ADDITIONAL TESTS:       PHYSICAL EXAM:    General: WDWN female breathing on 6L NC O2   HEENT: NC/AT; PERRL, anicteric sclera; MMM  Neck: supple  Cardiovascular: +S1/S2, RRR  Respiratory: CTA B/L; no W/R/R  Gastrointestinal: soft, NT/ND; +BSx4  Extremities: WWP; no edema, clubbing or cyanosis, no warmth/erythema or swelling.   Vascular: 2+ radial, DP/PT pulses B/L  Neurological: AAOx3; no focal deficits      MEDICATIONS:  MEDICATIONS  (STANDING):  ARIPiprazole 10 milliGRAM(s) Oral daily  atorvastatin 20 milliGRAM(s) Oral at bedtime  cholecalciferol 1000 Unit(s) Oral daily  dextrose 5%. 1000 milliLiter(s) (50 mL/Hr) IV Continuous <Continuous>  dextrose 50% Injectable 12.5 Gram(s) IV Push once  dextrose 50% Injectable 25 Gram(s) IV Push once  dextrose 50% Injectable 25 Gram(s) IV Push once  diVALproex ER 1000 milliGRAM(s) Oral at bedtime  enoxaparin Injectable 40 milliGRAM(s) SubCutaneous every 12 hours  ferrous    sulfate 325 milliGRAM(s) Oral daily  influenza  Vaccine (HIGH DOSE) 0.7 milliLiter(s) IntraMuscular once  insulin glargine Injectable (LANTUS) 8 Unit(s) SubCutaneous at bedtime  insulin lispro (HumaLOG) corrective regimen sliding scale   SubCutaneous Before meals and at bedtime  insulin lispro Injectable (HumaLOG) 3 Unit(s) SubCutaneous three times a day before meals  liothyronine 25 MICROGram(s) Oral daily  pantoprazole    Tablet 40 milliGRAM(s) Oral before breakfast  PARoxetine 30 milliGRAM(s) Oral daily  polyethylene glycol 3350 17 Gram(s) Oral every 24 hours  QUEtiapine 400 milliGRAM(s) Oral at bedtime    MEDICATIONS  (PRN):  benzocaine 15 mG/menthol 3.6 mG (Sugar-Free) Lozenge 1 Lozenge Oral two times a day PRN Sore Throat  dextrose 40% Gel 15 Gram(s) Oral once PRN Blood Glucose LESS THAN 70 milliGRAM(s)/deciliter  glucagon  Injectable 1 milliGRAM(s) IntraMuscular once PRN Glucose LESS THAN 70 milligrams/deciliter      ALLERGIES:  Allergies    No Known Allergies    Intolerances      RADIOLOGY & ADDITIONAL TESTS: Reviewed.

## 2020-10-12 LAB
GLUCOSE BLDC GLUCOMTR-MCNC: 203 MG/DL — HIGH (ref 70–99)
GLUCOSE BLDC GLUCOMTR-MCNC: 265 MG/DL — HIGH (ref 70–99)
GLUCOSE BLDC GLUCOMTR-MCNC: 317 MG/DL — HIGH (ref 70–99)
GLUCOSE BLDC GLUCOMTR-MCNC: 320 MG/DL — HIGH (ref 70–99)
SARS-COV-2 RNA SPEC QL NAA+PROBE: SIGNIFICANT CHANGE UP

## 2020-10-12 PROCEDURE — 99232 SBSQ HOSP IP/OBS MODERATE 35: CPT | Mod: CS,GC

## 2020-10-12 RX ADMIN — Medication 8: at 17:10

## 2020-10-12 RX ADMIN — Medication 40 MILLIGRAM(S): at 05:21

## 2020-10-12 RX ADMIN — QUETIAPINE FUMARATE 400 MILLIGRAM(S): 200 TABLET, FILM COATED ORAL at 21:54

## 2020-10-12 RX ADMIN — ENOXAPARIN SODIUM 40 MILLIGRAM(S): 100 INJECTION SUBCUTANEOUS at 05:20

## 2020-10-12 RX ADMIN — Medication 6: at 21:53

## 2020-10-12 RX ADMIN — LIOTHYRONINE SODIUM 25 MICROGRAM(S): 25 TABLET ORAL at 05:21

## 2020-10-12 RX ADMIN — Medication 30 MILLIGRAM(S): at 11:49

## 2020-10-12 RX ADMIN — ATORVASTATIN CALCIUM 20 MILLIGRAM(S): 80 TABLET, FILM COATED ORAL at 21:54

## 2020-10-12 RX ADMIN — Medication 8: at 12:14

## 2020-10-12 RX ADMIN — PANTOPRAZOLE SODIUM 40 MILLIGRAM(S): 20 TABLET, DELAYED RELEASE ORAL at 05:20

## 2020-10-12 RX ADMIN — Medication 2 UNIT(S): at 08:39

## 2020-10-12 RX ADMIN — Medication 2 UNIT(S): at 17:10

## 2020-10-12 RX ADMIN — ENOXAPARIN SODIUM 40 MILLIGRAM(S): 100 INJECTION SUBCUTANEOUS at 17:10

## 2020-10-12 RX ADMIN — Medication 325 MILLIGRAM(S): at 11:25

## 2020-10-12 RX ADMIN — DIVALPROEX SODIUM 1000 MILLIGRAM(S): 500 TABLET, DELAYED RELEASE ORAL at 21:54

## 2020-10-12 RX ADMIN — ARIPIPRAZOLE 10 MILLIGRAM(S): 15 TABLET ORAL at 11:49

## 2020-10-12 RX ADMIN — Medication 1000 UNIT(S): at 11:25

## 2020-10-12 RX ADMIN — Medication 2 UNIT(S): at 12:15

## 2020-10-12 RX ADMIN — POLYETHYLENE GLYCOL 3350 17 GRAM(S): 17 POWDER, FOR SOLUTION ORAL at 05:26

## 2020-10-12 RX ADMIN — INSULIN GLARGINE 6 UNIT(S): 100 INJECTION, SOLUTION SUBCUTANEOUS at 21:54

## 2020-10-12 RX ADMIN — Medication 4: at 08:39

## 2020-10-12 NOTE — PROGRESS NOTE ADULT - PROBLEM SELECTOR PLAN 2
Pt observed to have HR <50 overnight 9/30 and 10/1. Reported chest discomfort earlier on 10/1 but currently denies chest pain or palpitations. Denies prior history of bradycardia. EKG shows sinus bradycardia. Currently HR b/t 30-40s. HR improving since initiating dobutamine gtt (10/2). Arterial line placed 10/2.  - Pt has now been off of dobutamine since 10/5  - c/w maintaining Atropine at bedside and Pacer pads on

## 2020-10-12 NOTE — PROGRESS NOTE ADULT - SUBJECTIVE AND OBJECTIVE BOX
OVERNIGHT EVENTS: CODEY    SUBJECTIVE / INTERVAL HPI: Patient seen and examined at bedside. Feels subjectively well, denies worsening dyspnea or cough, fever, headache, chest pain, abdominal pain, palpitations.    VITAL SIGNS:  Vital Signs Last 24 Hrs  T(C): 36.7 (12 Oct 2020 14:52), Max: 36.7 (12 Oct 2020 14:52)  T(F): 98.1 (12 Oct 2020 14:52), Max: 98.1 (12 Oct 2020 14:52)  HR: 59 (12 Oct 2020 14:52) (52 - 68)  BP: 156/78 (12 Oct 2020 14:52) (109/66 - 156/78)  BP(mean): --  RR: 16 (12 Oct 2020 14:52) (16 - 18)  SpO2: 96% (12 Oct 2020 14:52) (96% - 97%)    PHYSICAL EXAM:    General: WDWN  HEENT: NC/AT; PERRL, anicteric sclera; MMM  Neck: supple  Cardiovascular: +S1/S2, RRR  Respiratory: CTA B/L; no W/R/R  Gastrointestinal: soft, NT/ND; +BSx4  Extremities: WWP; no edema, clubbing or cyanosis  Vascular: 2+ radial, DP/PT pulses B/L  Neurological: AAOx3; no focal deficits    MEDICATIONS:  MEDICATIONS  (STANDING):  ARIPiprazole 10 milliGRAM(s) Oral daily  atorvastatin 20 milliGRAM(s) Oral at bedtime  cholecalciferol 1000 Unit(s) Oral daily  dextrose 5%. 1000 milliLiter(s) (50 mL/Hr) IV Continuous <Continuous>  dextrose 50% Injectable 12.5 Gram(s) IV Push once  dextrose 50% Injectable 25 Gram(s) IV Push once  dextrose 50% Injectable 25 Gram(s) IV Push once  diVALproex ER 1000 milliGRAM(s) Oral at bedtime  enoxaparin Injectable 40 milliGRAM(s) SubCutaneous every 12 hours  ferrous    sulfate 325 milliGRAM(s) Oral daily  influenza  Vaccine (HIGH DOSE) 0.7 milliLiter(s) IntraMuscular once  insulin glargine Injectable (LANTUS) 6 Unit(s) SubCutaneous at bedtime  insulin lispro (HumaLOG) corrective regimen sliding scale   SubCutaneous Before meals and at bedtime  insulin lispro Injectable (HumaLOG) 2 Unit(s) SubCutaneous three times a day before meals  liothyronine 25 MICROGram(s) Oral daily  pantoprazole    Tablet 40 milliGRAM(s) Oral before breakfast  PARoxetine 30 milliGRAM(s) Oral daily  polyethylene glycol 3350 17 Gram(s) Oral every 24 hours  predniSONE   Tablet 40 milliGRAM(s) Oral daily  QUEtiapine 400 milliGRAM(s) Oral at bedtime    MEDICATIONS  (PRN):  benzocaine 15 mG/menthol 3.6 mG (Sugar-Free) Lozenge 1 Lozenge Oral two times a day PRN Sore Throat  dextrose 40% Gel 15 Gram(s) Oral once PRN Blood Glucose LESS THAN 70 milliGRAM(s)/deciliter  glucagon  Injectable 1 milliGRAM(s) IntraMuscular once PRN Glucose LESS THAN 70 milligrams/deciliter      ALLERGIES:  Allergies    No Known Allergies    Intolerances        LABS:                        12.3   6.55  )-----------( 163      ( 11 Oct 2020 05:57 )             39.9     10-11    141  |  102  |  25<H>  ----------------------------<  92  3.8   |  29  |  0.83    Ca    9.2      11 Oct 2020 05:57  Phos  4.0     10-11  Mg     2.0     10-11    TPro  6.5  /  Alb  3.3  /  TBili  0.6  /  DBili  x   /  AST  12  /  ALT  13  /  AlkPhos  70  10-11        CAPILLARY BLOOD GLUCOSE      POCT Blood Glucose.: 317 mg/dL (12 Oct 2020 12:04)      RADIOLOGY & ADDITIONAL TESTS: Reviewed.

## 2020-10-12 NOTE — PROGRESS NOTE ADULT - PROBLEM SELECTOR PLAN 1
2/2 COVID-19. Improving inflammatory markers. Imroving O2 requirements - currently on 5L NC with O2 sat 94%.  - c/w NC 5L and wean down O2 as tolerated  - continue to monitor respiratory status  - PT recommends discharge to [facility]NF  - Pending COVID swabs x2 (third swab sent today 10/12) for placement  -no placement yet as of 10/10 as per

## 2020-10-13 LAB
GLUCOSE BLDC GLUCOMTR-MCNC: 189 MG/DL — HIGH (ref 70–99)
GLUCOSE BLDC GLUCOMTR-MCNC: 213 MG/DL — HIGH (ref 70–99)
GLUCOSE BLDC GLUCOMTR-MCNC: 267 MG/DL — HIGH (ref 70–99)
GLUCOSE BLDC GLUCOMTR-MCNC: 391 MG/DL — HIGH (ref 70–99)
SARS-COV-2 RNA SPEC QL NAA+PROBE: SIGNIFICANT CHANGE UP

## 2020-10-13 PROCEDURE — 99232 SBSQ HOSP IP/OBS MODERATE 35: CPT | Mod: CS,GC

## 2020-10-13 RX ADMIN — Medication 4: at 21:56

## 2020-10-13 RX ADMIN — Medication 40 MILLIGRAM(S): at 05:28

## 2020-10-13 RX ADMIN — DIVALPROEX SODIUM 1000 MILLIGRAM(S): 500 TABLET, DELAYED RELEASE ORAL at 21:56

## 2020-10-13 RX ADMIN — ENOXAPARIN SODIUM 40 MILLIGRAM(S): 100 INJECTION SUBCUTANEOUS at 17:34

## 2020-10-13 RX ADMIN — ATORVASTATIN CALCIUM 20 MILLIGRAM(S): 80 TABLET, FILM COATED ORAL at 21:56

## 2020-10-13 RX ADMIN — Medication 2 UNIT(S): at 09:15

## 2020-10-13 RX ADMIN — Medication 1000 UNIT(S): at 12:31

## 2020-10-13 RX ADMIN — Medication 2: at 09:14

## 2020-10-13 RX ADMIN — INSULIN GLARGINE 6 UNIT(S): 100 INJECTION, SOLUTION SUBCUTANEOUS at 21:56

## 2020-10-13 RX ADMIN — Medication 10: at 12:30

## 2020-10-13 RX ADMIN — Medication 30 MILLIGRAM(S): at 12:31

## 2020-10-13 RX ADMIN — ARIPIPRAZOLE 10 MILLIGRAM(S): 15 TABLET ORAL at 12:32

## 2020-10-13 RX ADMIN — Medication 2 UNIT(S): at 12:30

## 2020-10-13 RX ADMIN — Medication 325 MILLIGRAM(S): at 12:31

## 2020-10-13 RX ADMIN — ENOXAPARIN SODIUM 40 MILLIGRAM(S): 100 INJECTION SUBCUTANEOUS at 05:27

## 2020-10-13 RX ADMIN — PANTOPRAZOLE SODIUM 40 MILLIGRAM(S): 20 TABLET, DELAYED RELEASE ORAL at 07:10

## 2020-10-13 RX ADMIN — LIOTHYRONINE SODIUM 25 MICROGRAM(S): 25 TABLET ORAL at 05:27

## 2020-10-13 RX ADMIN — POLYETHYLENE GLYCOL 3350 17 GRAM(S): 17 POWDER, FOR SOLUTION ORAL at 06:19

## 2020-10-13 RX ADMIN — QUETIAPINE FUMARATE 400 MILLIGRAM(S): 200 TABLET, FILM COATED ORAL at 21:56

## 2020-10-13 RX ADMIN — Medication 2 UNIT(S): at 17:34

## 2020-10-13 RX ADMIN — Medication 6: at 17:34

## 2020-10-13 NOTE — PROGRESS NOTE ADULT - PROBLEM SELECTOR PLAN 1
2/2 COVID-19. Improving inflammatory markers. Imroving O2 requirements - currently on 5L NC with O2 sat 94%.  - c/w NC 5L and wean down O2 as tolerated  - continue to monitor respiratory status  - PT recommends discharge to [facility]SNF  - Pending COVID swabs x2 (COVID negative x1 from 10/12 swab, pending 1 additional negative) for placement  -no placement yet as of 10/10 as per

## 2020-10-13 NOTE — CHART NOTE - NSCHARTNOTEFT_GEN_A_CORE
Admitting Diagnosis:   Patient is a 65y old  Female who presents with a chief complaint of shortness of breath (12 Oct 2020 16:19)      PAST MEDICAL & SURGICAL HISTORY:  Bipolar disorder    Hypothyroid    T2DM (type 2 diabetes mellitus)        Current Nutrition Order: Cst Cho no Snacks + Kosher        PO Intake: Good (%) [   ]  Fair (50-75%) [ x  ] Poor (<25%) [   ]    GI Issues: no reported n/v/d, no BM 2 days    Pain: denies     Skin Integrity: per flow sheet griselda score 16, sacral/ gluteal dermatitis     Labs: BUN 25       CAPILLARY BLOOD GLUCOSE      POCT Blood Glucose.: 189 mg/dL (13 Oct 2020 09:05)  POCT Blood Glucose.: 265 mg/dL (12 Oct 2020 21:33)  POCT Blood Glucose.: 320 mg/dL (12 Oct 2020 17:08)  POCT Blood Glucose.: 317 mg/dL (12 Oct 2020 12:04)      Medications:  MEDICATIONS  (STANDING):  ARIPiprazole 10 milliGRAM(s) Oral daily  atorvastatin 20 milliGRAM(s) Oral at bedtime  cholecalciferol 1000 Unit(s) Oral daily  dextrose 5%. 1000 milliLiter(s) (50 mL/Hr) IV Continuous <Continuous>  dextrose 50% Injectable 25 Gram(s) IV Push once  dextrose 50% Injectable 25 Gram(s) IV Push once  dextrose 50% Injectable 12.5 Gram(s) IV Push once  diVALproex ER 1000 milliGRAM(s) Oral at bedtime  enoxaparin Injectable 40 milliGRAM(s) SubCutaneous every 12 hours  ferrous    sulfate 325 milliGRAM(s) Oral daily  influenza  Vaccine (HIGH DOSE) 0.7 milliLiter(s) IntraMuscular once  insulin glargine Injectable (LANTUS) 6 Unit(s) SubCutaneous at bedtime  insulin lispro (HumaLOG) corrective regimen sliding scale   SubCutaneous Before meals and at bedtime  insulin lispro Injectable (HumaLOG) 2 Unit(s) SubCutaneous three times a day before meals  liothyronine 25 MICROGram(s) Oral daily  pantoprazole    Tablet 40 milliGRAM(s) Oral before breakfast  PARoxetine 30 milliGRAM(s) Oral daily  polyethylene glycol 3350 17 Gram(s) Oral every 24 hours  predniSONE   Tablet 40 milliGRAM(s) Oral daily  QUEtiapine 400 milliGRAM(s) Oral at bedtime    MEDICATIONS  (PRN):  benzocaine 15 mG/menthol 3.6 mG (Sugar-Free) Lozenge 1 Lozenge Oral two times a day PRN Sore Throat  dextrose 40% Gel 15 Gram(s) Oral once PRN Blood Glucose LESS THAN 70 milliGRAM(s)/deciliter  glucagon  Injectable 1 milliGRAM(s) IntraMuscular once PRN Glucose LESS THAN 70 milligrams/deciliter      Weight: 113kg (9/29)    Weight Change: no wt changes, please trend     Nutrition Focused Physical Exam: Completed [   ]  Not Pertinent [ x  ]    Estimated energy needs:   ABW 113kg, IBW 56.8kg, 199% IBW, ht 65", BMI 41.5   Ideal body weight (56.8kg) used for calculations as pt >120% of IBW. Needs adjusted for age, BMI, hypermetabolic state 2/2 COVID infection/ respiratory status   Energy: 1420-1704kcal/day (25-30kcal/kg)  Protein: 68-80g pro/day (1.2-1.4g pro/kg)  Fluids per team.    Subjective:   65F w PMHx bipolar disorder, PD, HLD, T2DM (A1C 6.9), anemia, multiple UTIs on chronic nitrofurantoin, hypothyroidism presenting from nursing home with cough and fever, admitted to [facility]W for management of sepsis 2/2 COVID19 PNA vs bacterial PNA. Sepsis now resolved. Pt on 3-6 L NC oxygen o/n, saturating in the low 90%. This AM pt on HFNC @50L, switched NRB, this afternoon pt now breathing on Venturi mask 50L and saturating in the high 80%. Deemed stable to step down to [facility] care from MICU 10/6. Continues on prednisone at this time, no further abx, , 203, 317 (H).     Unable to conduct a face to face interview or nutrition-focused physical exam due to limited contact restrictions related to the pt's medical condition and isolation precautions. Unable to reach pt due to disconnected number. Per visual assessment via window pt with 50-75% of breakfast tray consumed, per team pt eating well. Continues on 5L O2 with humidifier satting 96%, DC continues to be held pending O2 status. Please see nutrition recommendations below, RD to monitor and f/u per protocol.     Previous Nutrition Diagnosis: Increased nutrient needs RT increased demand for energy and protein AEB hypermetabolic state 2/2 viral infection (COVID19+)     Active [ x  ]  Resolved [   ]    If resolved, new PES:     Goal:  pt to continuously meet >75% estimated nutrient needs PO with good tolerance.    Recommendations:  1) Continue with current Consistent Carbohydrate w/o snacks, Kosher diet. Monitor for diet tolerance.  2) POCT q6hrs, maintain tight BG control.  3) Monitor CBC, CrP, Ferritin, lytes and replete prn   4) Continue with current micronutrient supplementation, at team discretion  5) Trend weights.  6) Pain and bowel regimen per team   7) Fluids per team    Education: unable to reach pt via phone, will continue to attempt     Risk Level: High [   ] Moderate [ x  ] Low [   ]

## 2020-10-13 NOTE — PROGRESS NOTE ADULT - ATTENDING COMMENTS
I have personally seen, examined, and participated in the care of this patient.  I have reviewed all pertinent clinical information, including history, physical exam, plan and the Fellow’s note and agree except as noted.    I was physically present for the key portions of the evaluation and management (E/M) service provided.  I agree with the above history, physical, and plan which I have reviewed and edited where appropriate.       Plan discussed with cardiac and COVID critical care team.
Patient seen and examined with house-staff during bedside rounds.  Resident note read, including vitals, physical findings, laboratory data, and radiological reports.   Revisions included below.  Direct personal management at bed side and extensive interpretation of the data.  Plan was outlined and discussed in details with the housestaff.  Decision making of high complexity  Action taken for acute disease activity to reflect the level of care provided:  - medication reconciliation  - review laboratory data
Patient seen and examined with house-staff during bedside rounds.  Resident note read, including vitals, physical findings, laboratory data, and radiological reports.   Revisions included below.  Direct personal management at bed side and extensive interpretation of the data.  Plan was outlined and discussed in details with the housestaff.  Decision making of high complexity  Action taken for acute disease activity to reflect the level of care provided:  - medication reconciliation  - review laboratory data    The patient is clinically stable.  First nasal swab for COVID was negative but with the second.  Blood sugars uncontrolled.  She is on taper steroids.  Adjust insulin.  She is tolerating nasal cannula with saturation 97 and will start weaning down the oxygen requirement
Patient seen and examined with house-staff during bedside rounds.  Resident note read, including vitals, physical findings, laboratory data, and radiological reports.   Revisions included below.  Direct personal management at bed side and extensive interpretation of the data.  Plan was outlined and discussed in details with the housestaff.  Decision making of high complexity  Action taken for acute disease activity to reflect the level of care provided:  - medication reconciliation  - review laboratory data
Patient seen and examined with house-staff during bedside rounds.  Resident note read, including vitals, physical findings, laboratory data, and radiological reports.   Revisions included below.  Direct personal management at bed side and extensive interpretation of the data.  Plan was outlined and discussed in details with the housestaff.  Decision making of high complexity  Action taken for acute disease activity to reflect the level of care provided:  - medication reconciliation  - review laboratory dataPrior the patient was clinically stable and she was tapered off dobutamine.  Heart rate is stable.  Patient is tolerating nasal cannula and saturation is adequate.  The fluid status stable.  The patient is tolerating diet with no aspiration.  Patient is off antiviral.  She is on DVT prophylaxis.  Discussed with  regarding discharge planning
Patient seen and examined with house-staff during bedside rounds.  Resident note read, including vitals, physical findings, laboratory data, and radiological reports.   Revisions included below.  Direct personal management at bed side and extensive interpretation of the data.  Plan was outlined and discussed in details with the housestaff.  Decision making of high complexity  Action taken for acute disease activity to reflect the level of care provided:  - medication reconciliation  - review laboratory data
Patient seen and examined with house-staff during bedside rounds.  Resident note read, including vitals, physical findings, laboratory data, and radiological reports.   Revisions included below.  Direct personal management at bed side and extensive interpretation of the data.  Plan was outlined and discussed in details with the housestaff.  Decision making of high complexity  Action taken for acute disease activity to reflect the level of care provided:  - medication reconciliation  - review laboratory data    The patient is clinically stable.  Continue him does appear.  Can use systemic steroids.   I decreased FiO2 tolerated well.  Out of bed in chair.  No evidence of fluid overload.  No evidence of bacterial infection
Patient seen and examined with house-staff during bedside rounds.  Resident note read, including vitals, physical findings, laboratory data, and radiological reports.   Revisions included below.  Direct personal management at bed side and extensive interpretation of the data.  Plan was outlined and discussed in details with the housestaff.  Decision making of high complexity  Action taken for acute disease activity to reflect the level of care provided:  - medication reconciliation  - review laboratory data  The patient is slowly improving.  We will wean off the dobutamine.  Monitor the blood pressure on the heart rate off dobutamine.  Her oxygen saturation improved and will decrease the FiO2 to 40%.  If stable will decrease the flow to 40%.  Patient oxygen saturation increases but almost 5 to 8% by deep inspiratory effort.  I instructed the patient to use the incentive spirometry.  Increase activity out of bed in chair.  The renal function and liver function are stable.  The blood sugar is uncontrolled and we started insulin and I adjusted the pre-meal and the Lantus.
Patient seen and examined with house-staff during bedside rounds.  Resident note read, including vitals, physical findings, laboratory data, and radiological reports.   Revisions included below.  Direct personal management at bed side and extensive interpretation of the data.  Plan was outlined and discussed in details with the housestaff.  Decision making of high complexity  Action taken for acute disease activity to reflect the level of care provided:  - medication reconciliation  - review laboratory data    Patient was clinic improving.  She was weaned off high flow nasal cannula and was changed to nasal cannula which tolerated well.  The patient is out of bed in chair.  Patient pulmonary status is stable.  Would discharge tomorrow if she is clinically improved
Patient seen and examined with house-staff during bedside rounds.  Resident note read, including vitals, physical findings, laboratory data, and radiological reports.   Revisions included below.  Direct personal management at bed side and extensive interpretation of the data.  Plan was outlined and discussed in details with the housestaff.  Decision making of high complexity  Action taken for acute disease activity to reflect the level of care provided:  - medication reconciliation  - review laboratory data  The patient is clinically stable.  Patient was decreased from 15 L/min to 10 L which was tolerated well and after that she was changed to 6 L/min.  Will observe overnight I will change to nasal cannula tomorrow.  Out of bed in chair.  He markers are decreasing.  Patient is on oxygen steroids and antiviral.  Follow-up on thyroid function test.  Follow-up with cardiology regarding the sinus bradycardia
Patient seen and examined with house-staff during bedside rounds.  Resident note read, including vitals, physical findings, laboratory data, and radiological reports.   Revisions included below.  Direct personal management at bed side and extensive interpretation of the data.  Plan was outlined and discussed in details with the housestaff.  Decision making of high complexity  Action taken for acute disease activity to reflect the level of care provided:  - medication reconciliation  - review laboratory data
Patient seen and examined with house-staff during bedside rounds.  Resident note read, including vitals, physical findings, laboratory data, and radiological reports.   Revisions included below.  Direct personal management at bed side and extensive interpretation of the data.  Plan was outlined and discussed in details with the housestaff.  Decision making of high complexity  Action taken for acute disease activity to reflect the level of care provided:  - medication reconciliation  - review laboratory data    Patient is clinically stable.  She is on 100% nonrebreather with 15 L/min and saturation was 100%.  I changed the patient to 6 L nasal cannula and her oxygen saturation dropped.  I change the patient back to 100% nonrebreather at 10 L/min.  We will continue to wean oxygen as tolerated.  Continue systemic steroids and antiviral.  Out of bed in chair.  Liver and renal function are stable.
Patient seen and examined with house-staff during bedside rounds.  Resident note read, including vitals, physical findings, laboratory data, and radiological reports.   Revisions included below.  Direct personal management at bed side and extensive interpretation of the data.  Plan was outlined and discussed in details with the housestaff.  Decision making of high complexity  Action taken for acute disease activity to reflect the level of care provided:  - medication reconciliation  - review laboratory data    I discussed the case in details with the resident.  Patient desatted off the nonrebreather.  The patient oxygen saturation improved on the nonrebreather.  Patient is in sinus bradycardia down to 30s.  The cardiology and EP were consulted.  We will start dobutamine.  The impression is that that bradycardia is related to the cold and infection.  TSH is low.  Continue antiviral and steroids.

## 2020-10-13 NOTE — CHART NOTE - NSCHARTNOTESELECT_GEN_ALL_CORE
Malnutrition Notification
Nutrition Follow-up/Nutrition Services
Nutrition Services
Nutrition Services/Nutrition Follow Up

## 2020-10-13 NOTE — PROGRESS NOTE ADULT - SUBJECTIVE AND OBJECTIVE BOX
OVERNIGHT EVENTS: CODEY    SUBJECTIVE / INTERVAL HPI: Patient seen and examined at bedside. COVID negative x1, pending second negative for SNF placement. Pt feels subjectively well, continues to note gradual improvements in dyspnea and cough. Denies fever, abdominal pain, chest pain, n/v/d/c.    O2 Requirements: Continues to be on NC 5L with SpO2 92-96%    VITAL SIGNS:  Vital Signs Last 24 Hrs  T(C): 37 (13 Oct 2020 12:39), Max: 37 (13 Oct 2020 12:39)  T(F): 98.6 (13 Oct 2020 12:39), Max: 98.6 (13 Oct 2020 12:39)  HR: 61 (13 Oct 2020 12:39) (55 - 61)  BP: 158/96 (13 Oct 2020 12:39) (138/82 - 158/96)  BP(mean): --  RR: 18 (13 Oct 2020 12:39) (18 - 18)  SpO2: 96% (13 Oct 2020 12:39) (95% - 96%)    PHYSICAL EXAM:    General: WDWN  HEENT: NC/AT; PERRL, anicteric sclera; MMM  Neck: supple  Cardiovascular: +S1/S2, RRR  Respiratory: CTA B/L; no W/R/R  Gastrointestinal: soft, NT/ND; +BSx4  Extremities: WWP; no edema, clubbing or cyanosis  Vascular: 2+ radial, DP/PT pulses B/L  Neurological: AAOx3; no focal deficits    MEDICATIONS:  MEDICATIONS  (STANDING):  ARIPiprazole 10 milliGRAM(s) Oral daily  atorvastatin 20 milliGRAM(s) Oral at bedtime  cholecalciferol 1000 Unit(s) Oral daily  dextrose 5%. 1000 milliLiter(s) (50 mL/Hr) IV Continuous <Continuous>  dextrose 50% Injectable 12.5 Gram(s) IV Push once  dextrose 50% Injectable 25 Gram(s) IV Push once  dextrose 50% Injectable 25 Gram(s) IV Push once  diVALproex ER 1000 milliGRAM(s) Oral at bedtime  enoxaparin Injectable 40 milliGRAM(s) SubCutaneous every 12 hours  ferrous    sulfate 325 milliGRAM(s) Oral daily  influenza  Vaccine (HIGH DOSE) 0.7 milliLiter(s) IntraMuscular once  insulin glargine Injectable (LANTUS) 6 Unit(s) SubCutaneous at bedtime  insulin lispro (HumaLOG) corrective regimen sliding scale   SubCutaneous Before meals and at bedtime  insulin lispro Injectable (HumaLOG) 2 Unit(s) SubCutaneous three times a day before meals  liothyronine 25 MICROGram(s) Oral daily  pantoprazole    Tablet 40 milliGRAM(s) Oral before breakfast  PARoxetine 30 milliGRAM(s) Oral daily  polyethylene glycol 3350 17 Gram(s) Oral every 24 hours  predniSONE   Tablet 40 milliGRAM(s) Oral daily  QUEtiapine 400 milliGRAM(s) Oral at bedtime    MEDICATIONS  (PRN):  benzocaine 15 mG/menthol 3.6 mG (Sugar-Free) Lozenge 1 Lozenge Oral two times a day PRN Sore Throat  dextrose 40% Gel 15 Gram(s) Oral once PRN Blood Glucose LESS THAN 70 milliGRAM(s)/deciliter  glucagon  Injectable 1 milliGRAM(s) IntraMuscular once PRN Glucose LESS THAN 70 milligrams/deciliter      ALLERGIES:  Allergies    No Known Allergies    Intolerances        LABS:              CAPILLARY BLOOD GLUCOSE      POCT Blood Glucose.: 391 mg/dL (13 Oct 2020 12:09)      RADIOLOGY & ADDITIONAL TESTS: Reviewed.

## 2020-10-14 ENCOUNTER — TRANSCRIPTION ENCOUNTER (OUTPATIENT)
Age: 65
End: 2020-10-14

## 2020-10-14 VITALS
SYSTOLIC BLOOD PRESSURE: 150 MMHG | OXYGEN SATURATION: 95 % | DIASTOLIC BLOOD PRESSURE: 60 MMHG | HEART RATE: 48 BPM | RESPIRATION RATE: 18 BRPM | TEMPERATURE: 99 F

## 2020-10-14 LAB
GLUCOSE BLDC GLUCOMTR-MCNC: 190 MG/DL — HIGH (ref 70–99)
GLUCOSE BLDC GLUCOMTR-MCNC: 222 MG/DL — HIGH (ref 70–99)
GLUCOSE BLDC GLUCOMTR-MCNC: 395 MG/DL — HIGH (ref 70–99)

## 2020-10-14 PROCEDURE — 85730 THROMBOPLASTIN TIME PARTIAL: CPT

## 2020-10-14 PROCEDURE — 86850 RBC ANTIBODY SCREEN: CPT

## 2020-10-14 PROCEDURE — 96374 THER/PROPH/DIAG INJ IV PUSH: CPT

## 2020-10-14 PROCEDURE — 93005 ELECTROCARDIOGRAM TRACING: CPT

## 2020-10-14 PROCEDURE — 84100 ASSAY OF PHOSPHORUS: CPT

## 2020-10-14 PROCEDURE — 82728 ASSAY OF FERRITIN: CPT

## 2020-10-14 PROCEDURE — 82803 BLOOD GASES ANY COMBINATION: CPT

## 2020-10-14 PROCEDURE — 99239 HOSP IP/OBS DSCHRG MGMT >30: CPT | Mod: CS

## 2020-10-14 PROCEDURE — 71045 X-RAY EXAM CHEST 1 VIEW: CPT

## 2020-10-14 PROCEDURE — 83735 ASSAY OF MAGNESIUM: CPT

## 2020-10-14 PROCEDURE — 86901 BLOOD TYPING SEROLOGIC RH(D): CPT

## 2020-10-14 PROCEDURE — 84145 PROCALCITONIN (PCT): CPT

## 2020-10-14 PROCEDURE — 80164 ASSAY DIPROPYLACETIC ACD TOT: CPT

## 2020-10-14 PROCEDURE — 96375 TX/PRO/DX INJ NEW DRUG ADDON: CPT

## 2020-10-14 PROCEDURE — 83605 ASSAY OF LACTIC ACID: CPT

## 2020-10-14 PROCEDURE — 36415 COLL VENOUS BLD VENIPUNCTURE: CPT

## 2020-10-14 PROCEDURE — 97161 PT EVAL LOW COMPLEX 20 MIN: CPT

## 2020-10-14 PROCEDURE — 82330 ASSAY OF CALCIUM: CPT

## 2020-10-14 PROCEDURE — 87040 BLOOD CULTURE FOR BACTERIA: CPT

## 2020-10-14 PROCEDURE — 97116 GAIT TRAINING THERAPY: CPT

## 2020-10-14 PROCEDURE — 99285 EMERGENCY DEPT VISIT HI MDM: CPT | Mod: 25

## 2020-10-14 PROCEDURE — U0003: CPT

## 2020-10-14 PROCEDURE — 85025 COMPLETE CBC W/AUTO DIFF WBC: CPT

## 2020-10-14 PROCEDURE — 82962 GLUCOSE BLOOD TEST: CPT

## 2020-10-14 PROCEDURE — 86900 BLOOD TYPING SEROLOGIC ABO: CPT

## 2020-10-14 PROCEDURE — 84132 ASSAY OF SERUM POTASSIUM: CPT

## 2020-10-14 PROCEDURE — 86140 C-REACTIVE PROTEIN: CPT

## 2020-10-14 PROCEDURE — 85027 COMPLETE CBC AUTOMATED: CPT

## 2020-10-14 PROCEDURE — 84443 ASSAY THYROID STIM HORMONE: CPT

## 2020-10-14 PROCEDURE — 84295 ASSAY OF SERUM SODIUM: CPT

## 2020-10-14 PROCEDURE — 80053 COMPREHEN METABOLIC PANEL: CPT

## 2020-10-14 PROCEDURE — 83036 HEMOGLOBIN GLYCOSYLATED A1C: CPT

## 2020-10-14 PROCEDURE — 85610 PROTHROMBIN TIME: CPT

## 2020-10-14 PROCEDURE — 85379 FIBRIN DEGRADATION QUANT: CPT

## 2020-10-14 RX ORDER — PANTOPRAZOLE SODIUM 20 MG/1
1 TABLET, DELAYED RELEASE ORAL
Qty: 0 | Refills: 0 | DISCHARGE
Start: 2020-10-14 | End: 2020-10-28

## 2020-10-14 RX ORDER — APIXABAN 2.5 MG/1
1 TABLET, FILM COATED ORAL
Qty: 60 | Refills: 0
Start: 2020-10-14 | End: 2020-11-12

## 2020-10-14 RX ADMIN — Medication 10: at 12:23

## 2020-10-14 RX ADMIN — Medication 1000 UNIT(S): at 12:23

## 2020-10-14 RX ADMIN — Medication 40 MILLIGRAM(S): at 05:53

## 2020-10-14 RX ADMIN — Medication 325 MILLIGRAM(S): at 12:23

## 2020-10-14 RX ADMIN — ENOXAPARIN SODIUM 40 MILLIGRAM(S): 100 INJECTION SUBCUTANEOUS at 17:19

## 2020-10-14 RX ADMIN — Medication 4: at 09:26

## 2020-10-14 RX ADMIN — PANTOPRAZOLE SODIUM 40 MILLIGRAM(S): 20 TABLET, DELAYED RELEASE ORAL at 06:00

## 2020-10-14 RX ADMIN — Medication 2 UNIT(S): at 12:23

## 2020-10-14 RX ADMIN — ENOXAPARIN SODIUM 40 MILLIGRAM(S): 100 INJECTION SUBCUTANEOUS at 05:53

## 2020-10-14 RX ADMIN — ARIPIPRAZOLE 10 MILLIGRAM(S): 15 TABLET ORAL at 12:47

## 2020-10-14 RX ADMIN — Medication 2: at 16:46

## 2020-10-14 RX ADMIN — POLYETHYLENE GLYCOL 3350 17 GRAM(S): 17 POWDER, FOR SOLUTION ORAL at 05:53

## 2020-10-14 RX ADMIN — Medication 30 MILLIGRAM(S): at 12:47

## 2020-10-14 RX ADMIN — Medication 2 UNIT(S): at 16:46

## 2020-10-14 RX ADMIN — Medication 2 UNIT(S): at 09:27

## 2020-10-14 RX ADMIN — LIOTHYRONINE SODIUM 25 MICROGRAM(S): 25 TABLET ORAL at 05:53

## 2020-10-14 NOTE — PROGRESS NOTE ADULT - PROVIDER SPECIALTY LIST ADULT
Cardiology
Internal Medicine
MICU
Internal Medicine

## 2020-10-14 NOTE — PROGRESS NOTE ADULT - PROBLEM SELECTOR PLAN 1
2/2 COVID-19. Improving inflammatory markers. Imroving O2 requirements - currently on 5L NC with O2 sat 94%.  - c/w NC 5L and wean down O2 as tolerated  - continue to monitor respiratory status  - PT recommends discharge to [facility]  -currently s/p two negative covid swabs, pending acceptance and transfer to assisted living

## 2020-10-14 NOTE — PROGRESS NOTE ADULT - PROBLEM SELECTOR PLAN 2
Pt observed to have HR <50 overnight 9/30, 10/1, and 10/13. Reported chest discomfort earlier on 10/1 but currently denies chest pain or palpitations. Denies prior history of bradycardia. EKG shows sinus bradycardia. Currently HR b/t 30-40s. HR improving since initiating dobutamine gtt (10/2). Arterial line placed 10/2.  - Pt has now been off of dobutamine since 10/5  - c/w maintaining Atropine at bedside and Pacer pads on

## 2020-10-14 NOTE — PROGRESS NOTE ADULT - REASON FOR ADMISSION

## 2020-10-14 NOTE — PROGRESS NOTE ADULT - COVID-19 NEGATIVE LAB RESULT
COVID-19 ruled out

## 2020-10-14 NOTE — PROGRESS NOTE ADULT - PROBLEM SELECTOR PLAN 3
Pt initially COVID positive x2, presenting with fever of 101.4F in ED on 9/29, now afebrile and with two negative covid swabs  - contact/droplet isolation precautions  - c/w NC 5L as tolerated   - s/p 8 days of Remdesivir  - c/w Decadron IV 6mg daily x 10days (first dose 9/30 12 AM)

## 2020-10-14 NOTE — DISCHARGE NOTE NURSING/CASE MANAGEMENT/SOCIAL WORK - PATIENT PORTAL LINK FT
You can access the FollowMyHealth Patient Portal offered by [facility] by registering at the following website: [email or web address]. By joining [facility]’s FollowMyHealth portal, you will also be able to view your health information using other applications (apps) compatible with our system.

## 2020-10-14 NOTE — PROGRESS NOTE ADULT - SUBJECTIVE AND OBJECTIVE BOX
INTERVAL HPI/OVERNIGHT EVENTS: Overnight the patient had an episode of bradycardia to the 40s. She was asymptomatic and repeat EKG showed no changed. She states that she overall feels well/    VITAL SIGNS:  T(F): 97.5 (10-14-20 @ 09:34)  HR: 55 (10-14-20 @ 09:34)  BP: 142/82 (10-14-20 @ 09:34)  RR: 18 (10-14-20 @ 09:34)  SpO2: 95% (10-14-20 @ 09:34)  Wt(kg): --    VITAL SIGNS:  T(C): 36.4 (10-14-20 @ 09:34), Max: 36.8 (10-13-20 @ 21:27)  T(F): 97.5 (10-14-20 @ 09:34), Max: 98.2 (10-13-20 @ 21:27)  HR: 55 (10-14-20 @ 09:34) (46 - 55)  BP: 142/82 (10-14-20 @ 09:34) (142/82 - 154/83)  BP(mean): --  RR: 18 (10-14-20 @ 09:34) (15 - 18)  SpO2: 95% (10-14-20 @ 09:34) (95% - 97%)  Wt(kg): --    PHYSICAL EXAM:    Constitutional: WDWN resting comfortably in bed; NAD  Eyes: anicteric sclera  ENT: no nasal discharge; MMM  Neck: supple  Respiratory: CTA B/L; no W/R/R, no retractions;   Cardiac: +S1/S2; RRR; no M/R/G; PMI non-displaced  Gastrointestinal: soft, NT/ND; no rebound or guarding; +BSx4  Genitourinary: normal external genitalia  Back: spine midline, no bony tenderness or step-offs; no CVAT B/L  Extremities: WWP, no clubbing or cyanosis; no peripheral edema  Musculoskeletal: NROM x4; no joint swelling, tenderness or erythema  Vascular: 2+ radial, femoral, DP/PT pulses B/L  Dermatologic: skin warm, dry and intact; no rashes, wounds, or scars  Lymphatic: no submandibular or cervical LAD  Neurologic: AAOx3; CNII-XII grossly intact; no focal deficits  Psychiatric: affect and characteristics of appearance, verbalizations, behaviors are appropriate      MEDICATIONS  (STANDING):  ARIPiprazole 10 milliGRAM(s) Oral daily  atorvastatin 20 milliGRAM(s) Oral at bedtime  cholecalciferol 1000 Unit(s) Oral daily  dextrose 5%. 1000 milliLiter(s) (50 mL/Hr) IV Continuous <Continuous>  dextrose 50% Injectable 12.5 Gram(s) IV Push once  dextrose 50% Injectable 25 Gram(s) IV Push once  dextrose 50% Injectable 25 Gram(s) IV Push once  diVALproex ER 1000 milliGRAM(s) Oral at bedtime  enoxaparin Injectable 40 milliGRAM(s) SubCutaneous every 12 hours  ferrous    sulfate 325 milliGRAM(s) Oral daily  influenza  Vaccine (HIGH DOSE) 0.7 milliLiter(s) IntraMuscular once  insulin glargine Injectable (LANTUS) 6 Unit(s) SubCutaneous at bedtime  insulin lispro (HumaLOG) corrective regimen sliding scale   SubCutaneous Before meals and at bedtime  insulin lispro Injectable (HumaLOG) 2 Unit(s) SubCutaneous three times a day before meals  liothyronine 25 MICROGram(s) Oral daily  pantoprazole    Tablet 40 milliGRAM(s) Oral before breakfast  PARoxetine 30 milliGRAM(s) Oral daily  polyethylene glycol 3350 17 Gram(s) Oral every 24 hours  QUEtiapine 400 milliGRAM(s) Oral at bedtime    MEDICATIONS  (PRN):  benzocaine 15 mG/menthol 3.6 mG (Sugar-Free) Lozenge 1 Lozenge Oral two times a day PRN Sore Throat  dextrose 40% Gel 15 Gram(s) Oral once PRN Blood Glucose LESS THAN 70 milliGRAM(s)/deciliter  glucagon  Injectable 1 milliGRAM(s) IntraMuscular once PRN Glucose LESS THAN 70 milligrams/deciliter      Allergies    No Known Allergies    Intolerances        LABS:                RADIOLOGY & ADDITIONAL TESTS: Reviewed     INTERVAL HPI/OVERNIGHT EVENTS: Overnight the patient had an episode of bradycardia to the 40s. She was asymptomatic and repeat EKG showed no changed. She states that she overall feels well/    VITAL SIGNS:  T(F): 97.5 (10-14-20 @ 09:34)  HR: 55 (10-14-20 @ 09:34)  BP: 142/82 (10-14-20 @ 09:34)  RR: 18 (10-14-20 @ 09:34)  SpO2: 95% (10-14-20 @ 09:34)  Wt(kg): --    VITAL SIGNS:  T(C): 36.4 (10-14-20 @ 09:34), Max: 36.8 (10-13-20 @ 21:27)  T(F): 97.5 (10-14-20 @ 09:34), Max: 98.2 (10-13-20 @ 21:27)  HR: 55 (10-14-20 @ 09:34) (46 - 55)  BP: 142/82 (10-14-20 @ 09:34) (142/82 - 154/83)  BP(mean): --  RR: 18 (10-14-20 @ 09:34) (15 - 18)  SpO2: 95% (10-14-20 @ 09:34) (95% - 97%)  Wt(kg): --    PHYSICAL EXAM:    Constitutional: WDWN resting comfortably in bed; NAD  Eyes: anicteric sclera  ENT: no nasal discharge; MMM  Neck: supple  Respiratory: CTA B/L; no W/R/R, no retractions;   Cardiac: +S1/S2; RRR; no M/R/G; PMI non-displaced  Extremities: WWP, no clubbing or cyanosis; no peripheral edema  Musculoskeletal: NROM x4; no joint swelling, tenderness or erythema  Dermatologic: skin warm, dry and intact; no rashes, wounds, or scars  Neurologic: AAOx3; no focal deficits  Psychiatric: affect and characteristics of appearance, verbalizations, behaviors are appropriate      MEDICATIONS  (STANDING):  ARIPiprazole 10 milliGRAM(s) Oral daily  atorvastatin 20 milliGRAM(s) Oral at bedtime  cholecalciferol 1000 Unit(s) Oral daily  dextrose 5%. 1000 milliLiter(s) (50 mL/Hr) IV Continuous <Continuous>  dextrose 50% Injectable 12.5 Gram(s) IV Push once  dextrose 50% Injectable 25 Gram(s) IV Push once  dextrose 50% Injectable 25 Gram(s) IV Push once  diVALproex ER 1000 milliGRAM(s) Oral at bedtime  enoxaparin Injectable 40 milliGRAM(s) SubCutaneous every 12 hours  ferrous    sulfate 325 milliGRAM(s) Oral daily  influenza  Vaccine (HIGH DOSE) 0.7 milliLiter(s) IntraMuscular once  insulin glargine Injectable (LANTUS) 6 Unit(s) SubCutaneous at bedtime  insulin lispro (HumaLOG) corrective regimen sliding scale   SubCutaneous Before meals and at bedtime  insulin lispro Injectable (HumaLOG) 2 Unit(s) SubCutaneous three times a day before meals  liothyronine 25 MICROGram(s) Oral daily  pantoprazole    Tablet 40 milliGRAM(s) Oral before breakfast  PARoxetine 30 milliGRAM(s) Oral daily  polyethylene glycol 3350 17 Gram(s) Oral every 24 hours  QUEtiapine 400 milliGRAM(s) Oral at bedtime    MEDICATIONS  (PRN):  benzocaine 15 mG/menthol 3.6 mG (Sugar-Free) Lozenge 1 Lozenge Oral two times a day PRN Sore Throat  dextrose 40% Gel 15 Gram(s) Oral once PRN Blood Glucose LESS THAN 70 milliGRAM(s)/deciliter  glucagon  Injectable 1 milliGRAM(s) IntraMuscular once PRN Glucose LESS THAN 70 milligrams/deciliter      Allergies    No Known Allergies    Intolerances        LABS:                RADIOLOGY & ADDITIONAL TESTS: Reviewed

## 2020-10-27 DIAGNOSIS — J12.89 OTHER VIRAL PNEUMONIA: ICD-10-CM

## 2020-10-27 DIAGNOSIS — E11.9 TYPE 2 DIABETES MELLITUS WITHOUT COMPLICATIONS: ICD-10-CM

## 2020-10-27 DIAGNOSIS — E78.5 HYPERLIPIDEMIA, UNSPECIFIED: ICD-10-CM

## 2020-10-27 DIAGNOSIS — U07.1 COVID-19: ICD-10-CM

## 2020-10-27 DIAGNOSIS — E03.9 HYPOTHYROIDISM, UNSPECIFIED: ICD-10-CM

## 2020-10-27 DIAGNOSIS — F31.9 BIPOLAR DISORDER, UNSPECIFIED: ICD-10-CM

## 2020-10-27 DIAGNOSIS — Z79.84 LONG TERM (CURRENT) USE OF ORAL HYPOGLYCEMIC DRUGS: ICD-10-CM

## 2020-10-27 DIAGNOSIS — E66.01 MORBID (SEVERE) OBESITY DUE TO EXCESS CALORIES: ICD-10-CM

## 2020-10-27 DIAGNOSIS — D50.9 IRON DEFICIENCY ANEMIA, UNSPECIFIED: ICD-10-CM

## 2020-10-27 DIAGNOSIS — R65.20 SEVERE SEPSIS WITHOUT SEPTIC SHOCK: ICD-10-CM

## 2020-10-27 DIAGNOSIS — N39.0 URINARY TRACT INFECTION, SITE NOT SPECIFIED: ICD-10-CM

## 2020-10-27 DIAGNOSIS — R00.1 BRADYCARDIA, UNSPECIFIED: ICD-10-CM

## 2020-10-27 DIAGNOSIS — A41.89 OTHER SPECIFIED SEPSIS: ICD-10-CM

## 2020-10-27 DIAGNOSIS — J96.01 ACUTE RESPIRATORY FAILURE WITH HYPOXIA: ICD-10-CM

## 2020-10-27 DIAGNOSIS — I10 ESSENTIAL (PRIMARY) HYPERTENSION: ICD-10-CM

## 2021-11-08 NOTE — CONSULT NOTE ADULT - CONSULT REQUESTED DATE/TIME
01-Jul-2020 07:05
01-Jul-2020 19:40
30-Jun-2020 09:45
Alert-The patient is alert, awake and responds to voice. The patient is oriented to time, place, and person. The triage nurse is able to obtain subjective information.

## 2023-06-07 NOTE — PHYSICAL THERAPY INITIAL EVALUATION ADULT - HEALTH SCREEN CRITERIA
yes Detail Level: Generalized Detail Level: Zone Patient Specific Counseling (Will Not Stick From Patient To Patient): Pt to treat with hydroquinone 4% qhs taper as allows; if does not resolve can treat with Ln2

## 2024-02-27 NOTE — CONSULT NOTE ADULT - MINUTES
Is the patient currently in the state of [place]? YES    Visit mode:VIDEO    If the visit is dropped, the patient can be reconnected by: VIDEO VISIT: Text to cell phone:   Telephone Information:   Mobile [phone]       Will anyone else be joining the visit? NO  (If patient encounters technical issues they should call [phone])    How would you like to obtain your AVS? MyChart    Are changes needed to the allergy or medication list? No    Reason for visit: RECHECK    Suresh MIDDLETON      
15
65
30

## 2024-04-08 NOTE — PROGRESS NOTE ADULT - PROBLEM SELECTOR PLAN 3
SW/CM Discharge Plan  Informed son Anhtony patient is ready for discharge.  Patient to be picked up by [facility] today at 1130am. .Initial implementation of the patient’s discharge plan has been arranged, including any devices/equipment needed for discharge. Discharge plan communicated to MD, RN, and Receiving Facility/Agency.    Patient’s discharge destination is [facility] [place] [facility] [address], [place], [place].        Pt COVID postive x2,  presenting with fever of 101.4F in ED on 9/29  -contact/droplet isolation precautions  -c/w NC 5L as tolerated   -Discontinue Remdesivir at this time (received a total of 8 doses)  -c/w Decadron IV 6mg daily x 10days (first dose 9/30 12 AM)  -f/u daily COVID labs- CBC w/ diff, CMP, ferritin, CRP, D-dimer, Mg, Phos

## 2024-06-04 NOTE — PHYSICAL THERAPY INITIAL EVALUATION ADULT - ADL SKILLS, REHAB EVAL
9/5/19: Patient has had intermittent rectal bleeding, so she saw Dr. Lenny Pisano, who performed a colonoscopy on 7/5/19. This revealed inflammation of the rectum, melanosis coli, and small internal hemorrhoids. Biopsies confirmed the presence of chronic, active inflammation consistent with ulcerative proctitis. She was prescribed mesalamine 1.2 g (4 capsules daily = 4.8 g) when she saw Dr. Pisano in clinic on 7/23/19. Now she is constipated - no further diarrhea or blood in stool. She has some vague rectal discomfort. We discussed the natural history of the disease and its management today.   3/5/20: Patient had been doing relatively well on Lialda 2.4 g/day; however, she recently had to increase her Lialda to 4.8 g/day due to increase bowel frequency and abdominal cramping. No blood in stool. She usually has 1-2 BMs/day now but occasionally has 5 to 6 BMs/day. We talked about other treatment options for ulcerative proctitis today, including immunosuppressants and biologics.  3/2/21:  Patient feels well today.  On Lialda 4.8 g/day with good results.  Tried titrating down, but then she would have LLQ pain and bloating.  No blood in stool.  Has never had a colonoscopy by me - agreed to have one this year.  6/9/21:  Patient presents for follow up after I repeated her colonoscopy on 4/12/21.  This showed 6 polyps, some of which were sessile serrated adenomas.  She had some erythematous mucosa in the right colon, but biopsies showed only hemorrhage, not inflammation.  Biopsies from the rectum were unremarkable.  Patient feels fine today - no abdominal pain, diarrhea, or rectal bleeding.  5/16/22:  Patient presents for follow-up of her ulcerative proctitis.  She has not managed to decrease her Lialda to 2 capsules daily, but she has decreased them to 3 capsules daily.  If she goes down to 2 capsules, she gets bloating/cramping.  Today, she has no complaints.  We agreed that I should repeat her colonoscopy to reevaluate her colon.  5/19/23: Patient returns for reevaluation of her ulcerative proctitis. I performed her last colonoscopy on 5/16/22 and found evidence of quiescent ulcerative proctitis. She takes Lialda 3.6 g/day since she states that this is the dose that prevents her from having diarrhea or constipation. She states that even at this dose, she has occasional fecal impactions - she takes MiraLax and senna occasionally. She also complains of some dysphagia - she states that she sometimes get "choked" with food and feels a "tightness" in her upper abdomen.  6/4/24: Patient returns for reevaluation of her ulcerative proctitis and dysphagia issues. After seeing me on 5/19/23, she had a barium esophagram on 6/6/23 that showed "mild esophageal dysmotility" and mild delay in barium tablet passage. I performed an EGD on 7/24/23 that showed no esophageal abnormalities, but I did an empiric dilation with a 54-[employer] Savary dilator - she has no more dysphagia. She had some mild reactive gastropathy (incidental). Her colonoscopy showed two adenomatous polyps and melanosis, but there was no sign of inflammation, both endoscopically and pathologically. She continues to take Lialda 2.4 g/day without any side effects. She still has intermittent "tightness" in her upper abdomen that last for a few minuts. Very infrequent.
needed assist

## 2024-09-09 NOTE — PROGRESS NOTE ADULT - PROBLEM SELECTOR PLAN 2
Pt observed to have HR <50 overnight 9/30 and 10/1. Reported chest discomfort earlier on 10/1 but currently denies chest pain or palpitations. Denies prior history of bradycardia. EKG shows sinus bradycardia. Currently HR b/t 30-40s. HR improving since initiating dobutamine gtt (10/2). Arterial line placed 10/2.  - Pt has now been off of dobutamine since 10/5  - c/w maintaining Atropine at bedside and Pacer pads on normal... Well appearing, awake, alert, oriented to person, place, time/situation and in no apparent distress.

## 2025-01-02 NOTE — PROGRESS NOTE ADULT - PROBLEM SELECTOR PROBLEM 4
HOSPITALIST ATTENDING PROGRESS NOTE    Chart and meds reviewed.      Subjective:  Patient seen and examined at the bedside. States that her abdominal pain has resolved, and is asking to be started on a diet.    All other systems reviewed and found to be negative with the exception of what has been described above.    MEDICATIONS  (STANDING):  diltiazem    milliGRAM(s) Oral daily  gabapentin 300 milliGRAM(s) Oral every 8 hours  levothyroxine 75 MICROGram(s) Oral daily  losartan 100 milliGRAM(s) Oral daily  pantoprazole    Tablet 40 milliGRAM(s) Oral two times a day    MEDICATIONS  (PRN):  ALPRAZolam 0.5 milliGRAM(s) Oral daily PRN anxiety  artificial tears (preservative free) Ophthalmic Solution 1 Drop(s) Both EYES every 6 hours PRN Dry Eyes      PHYSICAL EXAM:  Gen: No acute distress  HEENT:  Normocephalic/Atraumatic  CV:  +S1, +S2, regular, no murmurs or rubs  RESP:   lungs clear to auscultation bilaterally, no wheezing, rales, rhonchi  GI:  abdomen soft, non-tender, non-distended  EXT:  no clubbing, no cyanosis, no edema  NEURO:  No focal neurological deficits    LABS:                            11.6   5.88  )-----------( 246      ( 02 Jan 2025 06:31 )             33.8     01-02    127[L]  |  101  |  7   ----------------------------<  104[H]  4.3   |  22  |  0.39[L]    Ca    8.1[L]      02 Jan 2025 14:03  Mg     1.8     01-01    TPro  5.6[L]  /  Alb  3.1[L]  /  TBili  0.5  /  DBili  x   /  AST  22  /  ALT  23  /  AlkPhos  32[L]  01-02        LIVER FUNCTIONS - ( 02 Jan 2025 06:31 )  Alb: 3.1 g/dL / Pro: 5.6 gm/dL / ALK PHOS: 32 U/L / ALT: 23 U/L / AST: 22 U/L / GGT: x             Urinalysis Basic - ( 02 Jan 2025 14:03 )    Color: x / Appearance: x / SG: x / pH: x  Gluc: 104 mg/dL / Ketone: x  / Bili: x / Urobili: x   Blood: x / Protein: x / Nitrite: x   Leuk Esterase: x / RBC: x / WBC x   Sq Epi: x / Non Sq Epi: x / Bacteria: x              CULTURES:      Additional results/Imaging, I have personally reviewed:    Telemetry, personally reviewed: T2DM (type 2 diabetes mellitus)